# Patient Record
Sex: FEMALE | Race: BLACK OR AFRICAN AMERICAN | NOT HISPANIC OR LATINO | Employment: OTHER | URBAN - METROPOLITAN AREA
[De-identification: names, ages, dates, MRNs, and addresses within clinical notes are randomized per-mention and may not be internally consistent; named-entity substitution may affect disease eponyms.]

---

## 2017-01-17 ENCOUNTER — HISTORICAL (OUTPATIENT)
Dept: ADMINISTRATIVE | Facility: HOSPITAL | Age: 62
End: 2017-01-17

## 2017-02-10 ENCOUNTER — HISTORICAL (OUTPATIENT)
Dept: OTOLARYNGOLOGY | Facility: CLINIC | Age: 62
End: 2017-02-10

## 2017-03-10 ENCOUNTER — HISTORICAL (OUTPATIENT)
Dept: LAB | Facility: HOSPITAL | Age: 62
End: 2017-03-10

## 2018-06-26 ENCOUNTER — HISTORICAL (OUTPATIENT)
Dept: ADMINISTRATIVE | Facility: HOSPITAL | Age: 63
End: 2018-06-26

## 2018-07-26 ENCOUNTER — HISTORICAL (OUTPATIENT)
Dept: ADMINISTRATIVE | Facility: HOSPITAL | Age: 63
End: 2018-07-26

## 2018-08-02 ENCOUNTER — HISTORICAL (OUTPATIENT)
Dept: ADMINISTRATIVE | Facility: HOSPITAL | Age: 63
End: 2018-08-02

## 2018-09-07 ENCOUNTER — HISTORICAL (OUTPATIENT)
Dept: ADMINISTRATIVE | Facility: HOSPITAL | Age: 63
End: 2018-09-07

## 2018-10-17 ENCOUNTER — HISTORICAL (OUTPATIENT)
Dept: LAB | Facility: HOSPITAL | Age: 63
End: 2018-10-17

## 2018-10-17 LAB
ABS NEUT (OLG): 3.1 X10(3)/MCL (ref 1.5–6.9)
APTT PPP: 29.1 SECOND(S) (ref 25–35)
BUN SERPL-MCNC: 14 MG/DL (ref 10–20)
CALCIUM SERPL-MCNC: 9 MG/DL (ref 8–10.5)
CHLORIDE SERPL-SCNC: 96 MMOL/L (ref 100–108)
CO2 SERPL-SCNC: 43 MMOL/L (ref 21–35)
CREAT SERPL-MCNC: 0.97 MG/DL (ref 0.7–1.3)
CREAT/UREA NIT SERPL: 14
ERYTHROCYTE [DISTWIDTH] IN BLOOD BY AUTOMATED COUNT: 14.1 % (ref 11.5–17)
GLUCOSE SERPL-MCNC: 132 MG/DL (ref 75–116)
HCT VFR BLD AUTO: 47.7 % (ref 36–48)
HGB BLD-MCNC: 14.1 GM/DL (ref 12–16)
INR PPP: 1 (ref 0–1.2)
MCH RBC QN AUTO: 28 PG (ref 27–34)
MCHC RBC AUTO-ENTMCNC: 30 GM/DL (ref 31–36)
MCV RBC AUTO: 93 FL (ref 80–99)
PLATELET # BLD AUTO: 178 X10(3)/MCL (ref 140–400)
PMV BLD AUTO: 11.1 FL (ref 6.8–10)
POTASSIUM SERPL-SCNC: 4.1 MMOL/L (ref 3.6–5.2)
PROTHROMBIN TIME: 10.7 SECOND(S) (ref 9–12)
RBC # BLD AUTO: 5.12 X10(6)/MCL (ref 4.2–5.4)
SODIUM SERPL-SCNC: 139 MMOL/L (ref 135–145)
WBC # SPEC AUTO: 5.1 X10(3)/MCL (ref 4.5–11.5)

## 2019-06-11 ENCOUNTER — HISTORICAL (OUTPATIENT)
Dept: ADMINISTRATIVE | Facility: HOSPITAL | Age: 64
End: 2019-06-11

## 2019-10-15 ENCOUNTER — HISTORICAL (OUTPATIENT)
Dept: ADMINISTRATIVE | Facility: HOSPITAL | Age: 64
End: 2019-10-15

## 2019-10-23 ENCOUNTER — HISTORICAL (OUTPATIENT)
Dept: ADMINISTRATIVE | Facility: HOSPITAL | Age: 64
End: 2019-10-23

## 2021-02-01 LAB
ALBUMIN SERPL-MCNC: 4.2 G/DL (ref 3.4–5)
ALBUMIN/GLOB SERPL: 1.4 {RATIO}
ALP SERPL-CCNC: 79 U/L (ref 50–144)
ALT SERPL-CCNC: 10 U/L (ref 1–45)
ANION GAP SERPL CALC-SCNC: 6 MMOL/L (ref 7–16)
ANISOCYTOSIS BLD QL SMEAR: NORMAL
AST SERPL-CCNC: 24 U/L (ref 14–36)
BASOPHILS # BLD AUTO: 0.04 X10(3)/MCL (ref 0.01–0.08)
BASOPHILS NFR BLD AUTO: 0.7 % (ref 0.1–1.2)
BILIRUB SERPL-MCNC: 0.7 MG/DL (ref 0.1–1)
BUN SERPL-MCNC: 19 MG/DL (ref 7–20)
CALCIUM SERPL-MCNC: 9.8 MG/DL (ref 8.4–10.2)
CHLORIDE SERPL-SCNC: 99 MMOL/L (ref 94–110)
CHOLEST SERPL-MCNC: 189 MG/DL (ref 0–200)
CO2 SERPL-SCNC: 33 MMOL/L (ref 21–32)
CREAT SERPL-MCNC: 0.9 MG/DL (ref 0.52–1.04)
CREAT/UREA NIT SERPL: 21.1 (ref 12–20)
DACRYOCYTES BLD QL SMEAR: SLIGHT
EOSINOPHIL # BLD AUTO: 0.64 X10(3)/MCL (ref 0.04–0.36)
EOSINOPHIL NFR BLD AUTO: 10.8 % (ref 0.7–7)
ERYTHROCYTE [DISTWIDTH] IN BLOOD BY AUTOMATED COUNT: 13.9 % (ref 11–14.5)
EST. AVERAGE GLUCOSE BLD GHB EST-MCNC: 144 MG/DL (ref 70–115)
GLOBULIN SER-MCNC: 3 G/DL (ref 2–3.9)
GLUCOSE SERPL-MCNC: 98 MGM./DL (ref 70–115)
HBA1C MFR BLD: 6.8 % (ref 4–6)
HCT VFR BLD AUTO: 46.7 % (ref 36–48)
HDLC SERPL-MCNC: 98 MG/DL (ref 40–60)
HGB BLD-MCNC: 14.2 G/DL (ref 11.8–16)
IMM GRANULOCYTES # BLD AUTO: 0.01 X10E3/UL (ref 0–0.03)
IMM GRANULOCYTES NFR BLD AUTO: 0.2 % (ref 0–0.5)
LDLC SERPL CALC-MCNC: 68.8 MG/DL (ref 30–100)
LYMPHOCYTES # BLD AUTO: 1.43 X10(3)/MCL (ref 1.16–3.74)
LYMPHOCYTES NFR BLD AUTO: 24.1 % (ref 20–55)
MACROCYTES BLD QL SMEAR: NORMAL
MCH RBC QN AUTO: 26.2 PG (ref 27–34)
MCHC RBC AUTO-ENTMCNC: 30.4 G/DL (ref 31–37)
MCV RBC AUTO: 86 FL (ref 79–99)
MICROCYTES BLD QL SMEAR: NORMAL
MONOCYTES # BLD AUTO: 0.41 X10(3)/MCL (ref 0.24–0.36)
MONOCYTES NFR BLD AUTO: 6.9 % (ref 4.7–12.5)
NEUTROPHILS # BLD AUTO: 3.4 X10(3)/MCL (ref 1.56–6.13)
NEUTROPHILS NFR BLD AUTO: 57.3 % (ref 37–73)
PLATELET # BLD AUTO: 226 X10(3)/MCL (ref 140–371)
PMV BLD AUTO: 12 FL (ref 9.4–12.4)
POTASSIUM SERPL-SCNC: 4.4 MMOL/L (ref 3.5–5.1)
PROT SERPL-MCNC: 7.2 G/DL (ref 6.3–8.2)
RBC # BLD AUTO: 5.43 X10(6)/MCL (ref 4–5.1)
RBC MORPH BLD: NORMAL
SODIUM SERPL-SCNC: 138 MMOL/L (ref 135–145)
TARGETS BLD QL SMEAR: SLIGHT
TRIGL SERPL-MCNC: 63 MG/DL (ref 30–200)
WBC # SPEC AUTO: 5.9 X10(3)/MCL (ref 4–11.5)

## 2021-07-28 LAB
CHOLEST SERPL-MCNC: 155 MG/DL (ref 0–200)
EST. AVERAGE GLUCOSE BLD GHB EST-MCNC: 144 MG/DL (ref 70–115)
HBA1C MFR BLD: 6.8 % (ref 4–6)
HDLC SERPL-MCNC: 71 MG/DL (ref 40–60)
LDLC SERPL CALC-MCNC: 56.6 MG/DL (ref 30–100)
TRIGL SERPL-MCNC: 74 MG/DL (ref 30–200)
TSH SERPL-ACNC: 3.15 UIU/ML (ref 0.36–3.74)

## 2021-09-13 LAB
ALBUMIN SERPL-MCNC: 3.7 G/DL (ref 3.4–5)
ALBUMIN/GLOB SERPL: 1.2 {RATIO}
ALP SERPL-CCNC: 78 U/L (ref 50–144)
ALT SERPL-CCNC: 67 U/L (ref 1–45)
ANION GAP SERPL CALC-SCNC: 2 MMOL/L (ref 7–16)
AST SERPL-CCNC: 65 U/L (ref 14–36)
BASOPHILS # BLD AUTO: 0.03 X10(3)/MCL (ref 0.01–0.08)
BASOPHILS NFR BLD AUTO: 0.5 % (ref 0.1–1.2)
BILIRUB SERPL-MCNC: 0.6 MG/DL (ref 0.1–1)
BUN SERPL-MCNC: 17 MG/DL (ref 7–20)
CALCIUM SERPL-MCNC: 9.1 MG/DL (ref 8.4–10.2)
CHLORIDE SERPL-SCNC: 99 MMOL/L (ref 94–110)
CO2 SERPL-SCNC: 38 MMOL/L (ref 21–32)
CREAT SERPL-MCNC: 0.95 MG/DL (ref 0.52–1.04)
CREAT/UREA NIT SERPL: 17.9 (ref 12–20)
EOSINOPHIL # BLD AUTO: 0.19 X10(3)/MCL (ref 0.04–0.36)
EOSINOPHIL NFR BLD AUTO: 3.4 % (ref 0.7–7)
ERYTHROCYTE [DISTWIDTH] IN BLOOD BY AUTOMATED COUNT: 15.3 % (ref 11–14.5)
GLOBULIN SER-MCNC: 3 G/DL (ref 2–3.9)
GLUCOSE SERPL-MCNC: 123 MGM./DL (ref 70–115)
HCT VFR BLD AUTO: 39.9 % (ref 36–48)
HGB BLD-MCNC: 11.7 G/DL (ref 11.8–16)
IMM GRANULOCYTES # BLD AUTO: 0.01 X10E3/UL (ref 0–0.03)
IMM GRANULOCYTES NFR BLD AUTO: 0.2 % (ref 0–0.5)
LYMPHOCYTES # BLD AUTO: 0.62 X10(3)/MCL (ref 1.16–3.74)
LYMPHOCYTES NFR BLD AUTO: 11.1 % (ref 20–55)
MCH RBC QN AUTO: 25.9 PG (ref 27–34)
MCHC RBC AUTO-ENTMCNC: 29.3 G/DL (ref 31–37)
MCV RBC AUTO: 88.5 FL (ref 79–99)
MONOCYTES # BLD AUTO: 0.26 X10(3)/MCL (ref 0.24–0.36)
MONOCYTES NFR BLD AUTO: 4.7 % (ref 4.7–12.5)
NEUTROPHILS # BLD AUTO: 4.47 X10(3)/MCL (ref 1.56–6.13)
NEUTROPHILS NFR BLD AUTO: 80.1 % (ref 37–73)
PLATELET # BLD AUTO: 241 X10(3)/MCL (ref 140–371)
PMV BLD AUTO: 10.6 FL (ref 9.4–12.4)
POTASSIUM SERPL-SCNC: 4.5 MMOL/L (ref 3.5–5.1)
PROT SERPL-MCNC: 6.7 G/DL (ref 6.3–8.2)
RBC # BLD AUTO: 4.51 X10(6)/MCL (ref 4–5.1)
SODIUM SERPL-SCNC: 139 MMOL/L (ref 135–145)
WBC # SPEC AUTO: 5.6 X10(3)/MCL (ref 4–11.5)

## 2021-10-18 LAB
ALBUMIN SERPL-MCNC: 4.1 G/DL (ref 3.4–5)
ALBUMIN/GLOB SERPL: 1.3 {RATIO}
ALP SERPL-CCNC: 84 U/L (ref 50–144)
ALT SERPL-CCNC: 18 U/L (ref 1–45)
ANION GAP SERPL CALC-SCNC: 7 MMOL/L (ref 7–16)
AST SERPL-CCNC: 28 U/L (ref 14–36)
BILIRUB SERPL-MCNC: 0.63 MG/DL (ref 0.1–1)
BUN SERPL-MCNC: 13 MG/DL (ref 7–20)
CALCIUM SERPL-MCNC: 9.6 MG/DL (ref 8.4–10.2)
CHLORIDE SERPL-SCNC: 95 MMOL/L (ref 94–110)
CO2 SERPL-SCNC: 39 MMOL/L (ref 21–32)
CREAT SERPL-MCNC: 0.98 MG/DL (ref 0.52–1.04)
CREAT/UREA NIT SERPL: 13.3 (ref 12–20)
GLOBULIN SER-MCNC: 3.2 G/DL (ref 2–3.9)
GLUCOSE SERPL-MCNC: 122 MGM./DL (ref 70–115)
POTASSIUM SERPL-SCNC: 4.1 MMOL/L (ref 3.5–5.1)
PROT SERPL-MCNC: 7.3 G/DL (ref 6.3–8.2)
SODIUM SERPL-SCNC: 141 MMOL/L (ref 135–145)

## 2022-01-17 LAB
ALBUMIN SERPL-MCNC: 4.3 G/DL (ref 3.4–5)
ALBUMIN/GLOB SERPL: 1.5 {RATIO}
ALP SERPL-CCNC: 73 U/L (ref 50–144)
ALT SERPL-CCNC: 14 U/L (ref 1–45)
ANION GAP SERPL CALC-SCNC: 4 MMOL/L (ref 2–13)
AST SERPL-CCNC: 29 U/L (ref 14–36)
BILIRUB SERPL-MCNC: 0.52 MG/DL (ref 0–1)
BUN SERPL-MCNC: 26 MG/DL (ref 7–20)
CALCIUM SERPL-MCNC: 9.8 MG/DL (ref 8.4–10.2)
CHLORIDE SERPL-SCNC: 101 MMOL/L (ref 94–110)
CO2 SERPL-SCNC: 38 MMOL/L (ref 21–32)
CREAT SERPL-MCNC: 0.82 MG/DL (ref 0.52–1.04)
CREAT/UREA NIT SERPL: 31.7 (ref 12–20)
EST CREAT CLEARANCE SER (OHS): 64.89 ML/MIN
EST. AVERAGE GLUCOSE BLD GHB EST-MCNC: 162 MG/DL (ref 70–115)
GLOBULIN SER-MCNC: 2.9 G/DL (ref 2–3.9)
GLUCOSE SERPL-MCNC: 117 MGM./DL (ref 70–115)
HBA1C MFR BLD: 7.4 % (ref 4–6)
POTASSIUM SERPL-SCNC: 4.3 MMOL/L (ref 3.5–5.1)
PROT SERPL-MCNC: 7.2 G/DL (ref 6.3–8.2)
SODIUM SERPL-SCNC: 143 MMOL/L (ref 135–145)

## 2022-04-10 ENCOUNTER — HISTORICAL (OUTPATIENT)
Dept: ADMINISTRATIVE | Facility: HOSPITAL | Age: 67
End: 2022-04-10
Payer: MEDICAID

## 2022-04-17 ENCOUNTER — HISTORICAL (OUTPATIENT)
Dept: ADMINISTRATIVE | Facility: HOSPITAL | Age: 67
End: 2022-04-17

## 2022-04-18 ENCOUNTER — HISTORICAL (OUTPATIENT)
Dept: ADMINISTRATIVE | Facility: HOSPITAL | Age: 67
End: 2022-04-18

## 2022-04-26 VITALS
BODY MASS INDEX: 23.48 KG/M2 | SYSTOLIC BLOOD PRESSURE: 84 MMHG | OXYGEN SATURATION: 98 % | WEIGHT: 132.5 LBS | DIASTOLIC BLOOD PRESSURE: 52 MMHG | HEIGHT: 63 IN

## 2022-04-30 ENCOUNTER — HISTORICAL (OUTPATIENT)
Dept: ADMINISTRATIVE | Facility: HOSPITAL | Age: 67
End: 2022-04-30
Payer: MEDICAID

## 2022-05-03 NOTE — HISTORICAL [facility] CERNER
This is a historical note converted from Yadi. Formatting and pictures may have been removed.  Please reference Yadi for original formatting and attached multimedia. Chief Complaint  follow up with lab results  History of Present Illness  65 BF PMH CAD, CHF, severe COPD, DOYLE here for 6m follow up.  ?   States she is doing well in general except her breathing; had been on Advair middle of last year but it was not working and she was utilizing?her rescue inhaler between 8-10 times daily as well as using nebulizer medication and was still having difficulty with dyspnea. She home oxygen and wears it PRN. She was given samples of Trelegy Ellipta to?try and felt it worked great and she was able to drastically cut the amount of times she relied on the Albuterol INH. She has now run out of samples and insurance has refused to cover the medications. So she is now relying on Nebulizer (duoneb)?and Albuterol INH.? She continues to?follow up with Pulmonology ([facility])?routinely.?  ?   Last cardiology (Dr Naylor)?checkup was good and had appt with them this morning with blood work. She is limited with activity due to her COPD but does not experience chest discomfort with activity.  ?   She is on Metformin 500mg daily, does not check blood sugar levels unless she feels its dropping--she keeps peppermints on hand or sometimes drinks OJ for lows; A1c 6.8%  ?   Has been on duloxetine 90mg?recently, doing well with it.  ?   Had lumbar surgery last year, still with pain. Refuses pain medication-Dr Quevedo has her on Gabapentin 400mg TID  ?   Labs: BUN 19, creatinine 0.90, +K 4.4, normal liver fx, Aic 6.8%, chol 189, HDL 98, trigs 63, LDL 68, H&H 14.2/46.7  ?   *Last Wellness June 2020  Review of Systems  Constitutional:?no fever, fatigue, weakness  Eye:?no vision changes, eye redness, drainage, or pain  ENMT:?no sore throat, ear pain, sinus pain/congestion, nasal congestion/drainage  Respiratory:?+cough, non-productive;  occasional?wheezing and?shortness of breath  Cardiovascular:?no chest pain, no palpitations, no edema  Gastrointestinal:?no nausea, vomiting, or diarrhea. No abdominal pain  Genitourinary:?no dysuria, no urinary frequency or urgency, no hematuria  Endocrine:?no heat or cold intolerance, no excessive thirst or excessive urination  Musculoskeletal:?chronic lumbar back pain, no joint swelling  Integumentary:?no skin rash or abnormal lesion  Neurologic: no headache, no dizziness, no weakness?  ?  Physical Exam  Vitals & Measurements  T:?36.7? ?C (Temporal Artery)? HR:?87(Peripheral)? BP:?90/58? SpO2:?92%?  HT:?161.80?cm? WT:?62.100?kg? BMI:?23.72?  General:?AAOx3, in no acute distress  Eye: PERRL, clear conjunctiva, eyelids normal  HENT:?TMs/ear canals clear, oropharynx without erythema/exudate  Neck: no thyromegaly or lymphadenopathy  Respiratory:?clear to auscultation bilaterally  Cardiovascular:?regular rate and rhythm without murmurs  Gastrointestinal:?soft, non-tender, with normal bowel sounds?  Integumentary: no rashes or skin lesions present, no peripheral edema  Musculoskeletal: full ROM of all extremities /spine without limitation; steady gait  ?  ?  Assessment/Plan  1.?COPD, severe ? J44.9  Appeal to Insurance to cover Trelegy Ellipta  Samples give?(2 boxes-30 doses)?  DO NOT use Advair any longer  ?  6 months FU--Annual Wellness with labs  2.?Diabetes mellitus ? E11.9  ?A1c 6.8%-Continue Metformin 500mg daily  3.?HTN - Hypertension ? I10  4.?Chronic low back pain ? M54.5  ?FU Ortho as scheduled  Referrals  Clinic Follow Up Established Well, *Est. 08/03/21 8:30:00 CDT, Order for future visit, Chronic low back pain, [facility]  Clinic Follow-Up Lab Draw, *Est. 07/28/21 8:45:00 CDT, Order for future visit, Chronic low back pain, [facility]   Problem List/Past Medical History  Ongoing  Chronic low back pain  COPD, severe  Diabetes mellitus  Encounter for routine  adult health examination with abnormal findings  GERD (gastroesophageal reflux disease)  HTN - Hypertension  Laryngopharyngeal reflux (LPR)  On home O2  Respiratory failure  Historical  No qualifying data  Procedure/Surgical History  Colonoscopy (02/28/2020)  bx of vocal cords (2016)  R knee sx (1996)  Carpal tunnel release  Cataract surgery of left eye  Cataract surgery of right eye  cyst removed left breast  Hysterectomy   Medications  albuterol CFC free 90 mcg/inh inhalation aerosol with adapter, 2 puff(s), INH, QID  albuterol-ipratropium 2.5 mg-0.5 mg/3 mL inhalation solution, 3 mL, NEB, QID  aspirin 81 mg oral tablet, 81 mg= 1 tab(s), Oral, Daily, 4 refills  carvedilol 3.125 mg oral tablet, 3.125 mg= 1 tab(s), Oral, BID  DULoxetine 30 mg oral delayed release capsule, 30 mg= 1 cap(s), Oral, Daily  DULoxetine 60 mg oral delayed release capsule, 60 mg= 1 cap(s), Oral, Daily  furosemide 20 mg oral tablet, 20 mg= 1 tab(s), Oral, Daily  gabapentin 400 mg oral capsule, 400 mg= 1 cap(s), Oral, TID  lisinopril 2.5 mg oral tablet, 2.5 mg= 1 tab(s), Oral, Daily, 3 refills  loratadine 10 mg oral tablet, 10 mg= 1 tab(s), Oral, Daily, 5 refills  metformin 500 mg oral tablet, 500 mg= 1 tab(s), Oral, Daily, 3 refills  omeprazole 40 mg oral DR capsule, 40 mg= 1 cap(s), Oral, Daily, PRN, 5 refills  Promethazine DM 6.25 mg-15 mg/5 mL oral syrup, 5 mL, Oral, q6hr, PRN  simvastatin 10 mg oral tablet, 10 mg= 1 tab(s), Oral, Once a day (at bedtime), 1 refills  Hermilo-24 300 mg/24 hours oral capsule, extended release, 300 mg= 1 cap(s), Oral, Daily  Trelegy Ellipta 200 mcg-62.5 mcg-25 mcg/inh inhalation powder, 1 puff(s), INH, Daily  Allergies  Darvocet-N 100  Social History  Abuse/Neglect  No, 02/02/2021  Alcohol  Never, 04/20/2016  Employment/School  Unemployed, 06/30/2016  Unemployed, 04/20/2016  Exercise  Self assessment: Poor condition., 06/30/2016  Home/Environment  Lives with Alone. Living situation: Home/Independent. Home  equipment: CPAP/BiPAP, Glucose monitoring, Oxygen, Respiratory treatments. Alcohol abuse in household: No. Substance abuse in household: No. Smoker in household: No., 06/30/2016  Lives with Alone. Home equipment: CPAP/BiPAP, Oxygen, NEBULIZER. Alcohol abuse in household: No. Substance abuse in household: No. Smoker in household: No. Feels unsafe at home: No. Safe place to go: Yes. Family/Friends available for support: Yes., 04/20/2016  Nutrition/Health  Regular, Diet restrictions: SPICY FOOD. Sleeping concerns: Yes. Feels highly stressed: No., 06/30/2016  Substance Use  Never, 04/20/2016  Tobacco  Former smoker, quit more than 30 days ago, Cigarettes, No, 1.5 per day. 47 year(s). 16 Years (Age started). 62 Years (Age stopped)., 02/02/2021  Former smoker, 06/30/2016  Family History  Aneurysm: Mother.  Congestive heart disease.: Mother.  Diabetes mellitus type 1.: Father.  Hypertension.: Brother.  Kidney disease: Father and Brother.  Primary malignant neoplasm of prostate: Father.

## 2022-05-06 ENCOUNTER — HISTORICAL (OUTPATIENT)
Dept: ADMINISTRATIVE | Facility: HOSPITAL | Age: 67
End: 2022-05-06

## 2022-05-06 LAB
AGE: 65
AGE: 65 YEARS
ALBUMIN SERPL-MCNC: 3.7 G/DL (ref 3.4–5)
ALBUMIN SERPL-MCNC: 3.7 G/DL (ref 3.4–5)
ALBUMIN/GLOB SERPL: 1.5 {RATIO}
ALBUMIN/GLOB SERPL: 1.5 {RATIO}
ALP SERPL-CCNC: 60 U/L (ref 50–144)
ALP SERPL-CCNC: 60 U/L (ref 50–144)
ALT SERPL-CCNC: 22 U/L (ref 1–45)
ANION GAP SERPL CALC-SCNC: 1 MMOL/L (ref 2–13)
ANION GAP SERPL CALC-SCNC: 1 MMOL/L (ref 2–13)
APTT PPP: 41.4 S (ref 24.3–32.8)
APTT PPP: 41.4 SECONDS (ref 24.3–32.8)
AST SERPL-CCNC: 45 U/L (ref 14–36)
BASOPHILS # BLD AUTO: 0.02 10*3/UL (ref 0.01–0.08)
BASOPHILS # BLD AUTO: 0.02 10*3/UL (ref 0.01–0.08)
BASOPHILS NFR BLD AUTO: 0.4 % (ref 0.1–1.2)
BASOPHILS NFR BLD AUTO: 0.4 % (ref 0.1–1.2)
BILIRUB SERPL-MCNC: 0.58 MG/DL (ref 0–1)
BILIRUB SERPL-MCNC: 0.58 MG/DL (ref 0–1)
BUN SERPL-MCNC: 14 MG/DL (ref 7–20)
CALCIUM SERPL-MCNC: 9.2 MG/DL (ref 8.4–10.2)
CALCIUM SERPL-MCNC: 9.2 MG/DL (ref 8.4–10.2)
CHLORIDE SERPL-SCNC: 101 MMOL/L (ref 98–110)
CO2 SERPL-SCNC: 37 MMOL/L (ref 21–32)
CO2 SERPL-SCNC: 37 MMOL/L (ref 21–32)
CREAT SERPL-MCNC: 0.71 MG/DL (ref 0.52–1.04)
CREAT SERPL-MCNC: 0.71 MG/DL (ref 0.52–1.04)
CREAT/UREA NIT SERPL: 19.7 (ref 12–20)
CREAT/UREA NIT SERPL: 19.7 (ref 12–20)
EOSINOPHIL # BLD AUTO: 0.2 10*3/UL (ref 0.04–0.36)
EOSINOPHIL # BLD AUTO: 0.2 10*3/UL (ref 0.04–0.36)
EOSINOPHIL NFR BLD AUTO: 4 % (ref 0.7–7)
EOSINOPHIL NFR BLD AUTO: 4 % (ref 0.7–7)
ERYTHROCYTE [DISTWIDTH] IN BLOOD BY AUTOMATED COUNT: 14.6 % (ref 11–14.5)
ERYTHROCYTE [DISTWIDTH] IN BLOOD BY AUTOMATED COUNT: 14.6 % (ref 11–14.5)
GLOBULIN SER-MCNC: 2.4 G/DL (ref 2–3.9)
GLUCOSE SERPL-MCNC: 97 MG/DL (ref 70–115)
GLUCOSE SERPL-MCNC: 97 MG/DL (ref 70–115)
HCT VFR BLD AUTO: 27.2 % (ref 36–48)
HCT VFR BLD AUTO: 27.2 % (ref 36–48)
HGB BLD-MCNC: 8.4 G/DL (ref 11.8–16)
HGB BLD-MCNC: 8.4 G/DL (ref 11.8–16)
IMM GRANULOCYTES # BLD AUTO: 0.01 10*3/UL (ref 0–0.03)
IMM GRANULOCYTES # BLD AUTO: 0.01 10*3/UL (ref 0–0.03)
IMM GRANULOCYTES NFR BLD AUTO: 0.2 % (ref 0–0.5)
IMM GRANULOCYTES NFR BLD AUTO: 0.2 % (ref 0–0.5)
INR PPP: 1.28 (ref 2–3)
LYMPHOCYTES # BLD AUTO: 0.95 10*3/UL (ref 1.16–3.74)
LYMPHOCYTES # BLD AUTO: 0.95 10*3/UL (ref 1.16–3.74)
LYMPHOCYTES NFR BLD AUTO: 19 % (ref 20–55)
LYMPHOCYTES NFR BLD AUTO: 19 % (ref 20–55)
MANUAL DIFF? (OHS): ABNORMAL
MANUAL DIFF? (OHS): NORMAL
MCH RBC QN AUTO: 27.4 PG (ref 27–34)
MCH RBC QN AUTO: 27.4 PG (ref 27–34)
MCHC RBC AUTO-ENTMCNC: 30.9 G/DL (ref 31–37)
MCHC RBC AUTO-ENTMCNC: 30.9 G/DL (ref 31–37)
MCV RBC AUTO: 88.6 FL (ref 79–99)
MCV RBC AUTO: 88.6 FL (ref 79–99)
MONOCYTES # BLD AUTO: 0.44 10*3/UL (ref 0.24–0.36)
MONOCYTES # BLD AUTO: 0.44 10*3/UL (ref 0.24–0.36)
MONOCYTES NFR BLD AUTO: 8.8 % (ref 4.7–12.5)
MONOCYTES NFR BLD AUTO: 8.8 % (ref 4.7–12.5)
NEUTROPHILS # BLD AUTO: 3.39 10*3/UL (ref 1.56–6.13)
NEUTROPHILS # BLD AUTO: 3.39 10*3/UL (ref 1.56–6.13)
NEUTROPHILS NFR BLD AUTO: 67.6 % (ref 37–73)
NEUTROPHILS NFR BLD AUTO: 67.6 % (ref 37–73)
PLATELET # BLD AUTO: 173 10*3/UL (ref 140–371)
PLATELET # BLD AUTO: 173 10*3/UL (ref 140–371)
PMV BLD AUTO: 11.6 FL (ref 9.4–12.4)
PMV BLD AUTO: 11.6 FL (ref 9.4–12.4)
POTASSIUM SERPL-SCNC: 4.1 MMOL/L (ref 3.5–5.1)
PROT SERPL-MCNC: 6.1 G/DL (ref 6.3–8.2)
PROT SERPL-MCNC: 6.1 G/DL (ref 6.3–8.2)
PROTHROMBIN TIME: 13.3 S (ref 9.3–11.9)
PROTHROMBIN TIME: 13.3 S (ref 9.3–11.9)
RBC # BLD AUTO: 3.07 10*6/UL (ref 4–5.1)
RBC # BLD AUTO: 3.07 10*6/UL (ref 4–5.1)
SODIUM SERPL-SCNC: 139 MMOL/L (ref 135–145)
SODIUM SERPL-SCNC: 139 MMOL/L (ref 135–145)
WBC # SPEC AUTO: 5 10*3/UL (ref 4–11.5)
WBC # SPEC AUTO: 5 10*3/UL (ref 4–11.5)

## 2022-05-09 LAB
BASOPHILS # BLD AUTO: 0.01 10*3/UL (ref 0.01–0.08)
BASOPHILS NFR BLD AUTO: 0.3 % (ref 0.1–1.2)
EOSINOPHIL # BLD AUTO: 0.16 10*3/UL (ref 0.04–0.36)
EOSINOPHIL NFR BLD AUTO: 4.2 % (ref 0.7–7)
ERYTHROCYTE [DISTWIDTH] IN BLOOD BY AUTOMATED COUNT: 14.4 % (ref 11–14.5)
HCT VFR BLD AUTO: 27.2 % (ref 36–48)
HGB BLD-MCNC: 8.5 G/DL (ref 11.8–16)
IMM GRANULOCYTES # BLD AUTO: 0 10*3/UL (ref 0–0.03)
IMM GRANULOCYTES NFR BLD AUTO: 0 % (ref 0–0.5)
LYMPHOCYTES # BLD AUTO: 0.84 10*3/UL (ref 1.16–3.74)
LYMPHOCYTES NFR BLD AUTO: 21.9 % (ref 20–55)
MANUAL DIFF? (OHS): ABNORMAL
MANUAL DIFF? (OHS): ABNORMAL
MANUAL DIFF? (OHS): NORMAL
MANUAL DIFF? (OHS): NORMAL
MCH RBC QN AUTO: 27 PG (ref 27–34)
MCHC RBC AUTO-ENTMCNC: 31.3 G/DL (ref 31–37)
MCV RBC AUTO: 86.3 FL (ref 79–99)
MONOCYTES # BLD AUTO: 0.37 10*3/UL (ref 0.24–0.36)
MONOCYTES NFR BLD AUTO: 9.7 % (ref 4.7–12.5)
NEUTROPHILS # BLD AUTO: 2.45 10*3/UL (ref 1.56–6.13)
NEUTROPHILS NFR BLD AUTO: 63.9 % (ref 37–73)
PLATELET # BLD AUTO: 203 10*3/UL (ref 140–371)
PMV BLD AUTO: 11.5 FL (ref 9.4–12.4)
RBC # BLD AUTO: 3.15 10*6/UL (ref 4–5.1)
WBC # SPEC AUTO: 3.8 10*3/UL (ref 4–11.5)

## 2022-05-10 ENCOUNTER — HISTORICAL (OUTPATIENT)
Dept: ADMINISTRATIVE | Facility: HOSPITAL | Age: 67
End: 2022-05-10

## 2022-06-28 ENCOUNTER — HISTORICAL (OUTPATIENT)
Dept: ADMINISTRATIVE | Facility: HOSPITAL | Age: 67
End: 2022-06-28

## 2022-09-07 ENCOUNTER — LAB VISIT (OUTPATIENT)
Dept: LAB | Facility: HOSPITAL | Age: 67
End: 2022-09-07
Attending: UROLOGY
Payer: MEDICARE

## 2022-09-07 DIAGNOSIS — R31.0 GROSS HEMATURIA: Primary | ICD-10-CM

## 2022-09-07 LAB
ANION GAP SERPL CALC-SCNC: 8 MEQ/L
BUN SERPL-MCNC: 17 MG/DL (ref 9.8–20.1)
CALCIUM SERPL-MCNC: 9.4 MG/DL (ref 8.4–10.2)
CHLORIDE SERPL-SCNC: 100 MMOL/L (ref 98–107)
CO2 SERPL-SCNC: 32 MMOL/L (ref 23–31)
CREAT SERPL-MCNC: 0.65 MG/DL (ref 0.55–1.02)
CREAT/UREA NIT SERPL: 26
GFR SERPLBLD CREATININE-BSD FMLA CKD-EPI: >60 MLS/MIN/1.73/M2
GLUCOSE SERPL-MCNC: 81 MG/DL (ref 82–115)
POTASSIUM SERPL-SCNC: 4.5 MMOL/L (ref 3.5–5.1)
SODIUM SERPL-SCNC: 140 MMOL/L (ref 136–145)

## 2022-09-07 PROCEDURE — 36415 COLL VENOUS BLD VENIPUNCTURE: CPT

## 2022-09-07 PROCEDURE — 80048 BASIC METABOLIC PNL TOTAL CA: CPT

## 2022-12-12 ENCOUNTER — HISTORICAL (OUTPATIENT)
Dept: ADMINISTRATIVE | Facility: HOSPITAL | Age: 67
End: 2022-12-12

## 2023-01-13 ENCOUNTER — HOSPITAL ENCOUNTER (INPATIENT)
Facility: HOSPITAL | Age: 68
LOS: 13 days | Discharge: ANOTHER HOSPITAL | DRG: 207 | End: 2023-02-02
Attending: FAMILY MEDICINE | Admitting: FAMILY MEDICINE
Payer: MEDICARE

## 2023-01-13 ENCOUNTER — HISTORICAL (OUTPATIENT)
Dept: ADMINISTRATIVE | Facility: HOSPITAL | Age: 68
End: 2023-01-13
Payer: MEDICARE

## 2023-01-13 PROCEDURE — 80053 COMPREHEN METABOLIC PANEL: CPT

## 2023-01-13 PROCEDURE — 96360 HYDRATION IV INFUSION INIT: CPT

## 2023-01-13 PROCEDURE — 71045 X-RAY EXAM CHEST 1 VIEW: CPT

## 2023-01-13 PROCEDURE — 94760 N-INVAS EAR/PLS OXIMETRY 1: CPT

## 2023-01-13 PROCEDURE — 36415 COLL VENOUS BLD VENIPUNCTURE: CPT

## 2023-01-13 PROCEDURE — 87040 BLOOD CULTURE FOR BACTERIA: CPT

## 2023-01-13 PROCEDURE — 85007 BL SMEAR W/DIFF WBC COUNT: CPT

## 2023-01-13 PROCEDURE — G0378 HOSPITAL OBSERVATION PER HR: HCPCS

## 2023-01-13 PROCEDURE — 87086 URINE CULTURE/COLONY COUNT: CPT

## 2023-01-13 PROCEDURE — 82746 ASSAY OF FOLIC ACID SERUM: CPT

## 2023-01-13 PROCEDURE — 86900 BLOOD TYPING SEROLOGIC ABO: CPT

## 2023-01-13 PROCEDURE — 74176 CT ABD & PELVIS W/O CONTRAST: CPT

## 2023-01-13 PROCEDURE — 94640 AIRWAY INHALATION TREATMENT: CPT

## 2023-01-13 PROCEDURE — 99990 CHARGE CONVERSION: CPT

## 2023-01-13 PROCEDURE — 99285 EMERGENCY DEPT VISIT HI MDM: CPT | Mod: 25

## 2023-01-13 PROCEDURE — P9016 RBC LEUKOCYTES REDUCED: HCPCS

## 2023-01-13 PROCEDURE — 82607 VITAMIN B-12: CPT

## 2023-01-13 PROCEDURE — 86901 BLOOD TYPING SEROLOGIC RH(D): CPT

## 2023-01-13 PROCEDURE — 87088 URINE BACTERIA CULTURE: CPT

## 2023-01-13 PROCEDURE — 86920 COMPATIBILITY TEST SPIN: CPT

## 2023-01-13 PROCEDURE — 36430 TRANSFUSION BLD/BLD COMPNT: CPT

## 2023-01-13 PROCEDURE — 86850 RBC ANTIBODY SCREEN: CPT

## 2023-01-13 PROCEDURE — 83605 ASSAY OF LACTIC ACID: CPT

## 2023-01-13 PROCEDURE — 93005 ELECTROCARDIOGRAM TRACING: CPT

## 2023-01-13 PROCEDURE — 81001 URINALYSIS AUTO W/SCOPE: CPT

## 2023-01-13 PROCEDURE — 85027 COMPLETE CBC AUTOMATED: CPT

## 2023-01-13 PROCEDURE — 83690 ASSAY OF LIPASE: CPT

## 2023-01-13 PROCEDURE — 71275 CT ANGIOGRAPHY CHEST: CPT

## 2023-01-13 PROCEDURE — 83880 ASSAY OF NATRIURETIC PEPTIDE: CPT

## 2023-01-14 ENCOUNTER — HISTORICAL (OUTPATIENT)
Dept: ADMINISTRATIVE | Facility: HOSPITAL | Age: 68
End: 2023-01-14
Payer: MEDICARE

## 2023-01-14 PROCEDURE — 82948 REAGENT STRIP/BLOOD GLUCOSE: CPT

## 2023-01-14 PROCEDURE — G0378 HOSPITAL OBSERVATION PER HR: HCPCS

## 2023-01-14 PROCEDURE — 85007 BL SMEAR W/DIFF WBC COUNT: CPT

## 2023-01-14 PROCEDURE — 99990 CHARGE CONVERSION: CPT

## 2023-01-14 PROCEDURE — 71045 X-RAY EXAM CHEST 1 VIEW: CPT

## 2023-01-14 PROCEDURE — P9016 RBC LEUKOCYTES REDUCED: HCPCS

## 2023-01-14 PROCEDURE — 80053 COMPREHEN METABOLIC PANEL: CPT

## 2023-01-14 PROCEDURE — 94640 AIRWAY INHALATION TREATMENT: CPT | Mod: 76

## 2023-01-14 PROCEDURE — 94761 N-INVAS EAR/PLS OXIMETRY MLT: CPT

## 2023-01-14 PROCEDURE — 93005 ELECTROCARDIOGRAM TRACING: CPT

## 2023-01-14 PROCEDURE — 85027 COMPLETE CBC AUTOMATED: CPT

## 2023-01-15 PROCEDURE — 85025 COMPLETE CBC W/AUTO DIFF WBC: CPT

## 2023-01-15 PROCEDURE — 84134 ASSAY OF PREALBUMIN: CPT

## 2023-01-15 PROCEDURE — G0378 HOSPITAL OBSERVATION PER HR: HCPCS

## 2023-01-15 PROCEDURE — 94761 N-INVAS EAR/PLS OXIMETRY MLT: CPT

## 2023-01-15 PROCEDURE — 80048 BASIC METABOLIC PNL TOTAL CA: CPT

## 2023-01-15 PROCEDURE — 99990 CHARGE CONVERSION: CPT | Mod: 76

## 2023-01-15 PROCEDURE — 94640 AIRWAY INHALATION TREATMENT: CPT | Mod: 76

## 2023-01-15 PROCEDURE — 82948 REAGENT STRIP/BLOOD GLUCOSE: CPT

## 2023-01-16 ENCOUNTER — HISTORICAL (OUTPATIENT)
Dept: ADMINISTRATIVE | Facility: HOSPITAL | Age: 68
End: 2023-01-16
Payer: MEDICARE

## 2023-01-16 PROCEDURE — 85027 COMPLETE CBC AUTOMATED: CPT

## 2023-01-16 PROCEDURE — 82948 REAGENT STRIP/BLOOD GLUCOSE: CPT

## 2023-01-16 PROCEDURE — 97530 THERAPEUTIC ACTIVITIES: CPT

## 2023-01-16 PROCEDURE — 93005 ELECTROCARDIOGRAM TRACING: CPT

## 2023-01-16 PROCEDURE — G0378 HOSPITAL OBSERVATION PER HR: HCPCS

## 2023-01-16 PROCEDURE — 99990 CHARGE CONVERSION: CPT

## 2023-01-16 PROCEDURE — 85007 BL SMEAR W/DIFF WBC COUNT: CPT

## 2023-01-16 PROCEDURE — 97116 GAIT TRAINING THERAPY: CPT

## 2023-01-16 PROCEDURE — 97161 PT EVAL LOW COMPLEX 20 MIN: CPT

## 2023-01-16 PROCEDURE — 93306 TTE W/DOPPLER COMPLETE: CPT

## 2023-01-16 PROCEDURE — 94640 AIRWAY INHALATION TREATMENT: CPT | Mod: 76

## 2023-01-16 PROCEDURE — 36415 COLL VENOUS BLD VENIPUNCTURE: CPT

## 2023-01-16 PROCEDURE — 94761 N-INVAS EAR/PLS OXIMETRY MLT: CPT

## 2023-01-16 PROCEDURE — 80048 BASIC METABOLIC PNL TOTAL CA: CPT

## 2023-01-17 ENCOUNTER — HISTORICAL (OUTPATIENT)
Dept: ADMINISTRATIVE | Facility: HOSPITAL | Age: 68
End: 2023-01-17
Payer: MEDICARE

## 2023-01-17 PROCEDURE — 82746 ASSAY OF FOLIC ACID SERUM: CPT

## 2023-01-17 PROCEDURE — 83550 IRON BINDING TEST: CPT

## 2023-01-17 PROCEDURE — 71045 X-RAY EXAM CHEST 1 VIEW: CPT

## 2023-01-17 PROCEDURE — 85027 COMPLETE CBC AUTOMATED: CPT

## 2023-01-17 PROCEDURE — 82948 REAGENT STRIP/BLOOD GLUCOSE: CPT

## 2023-01-17 PROCEDURE — 97530 THERAPEUTIC ACTIVITIES: CPT

## 2023-01-17 PROCEDURE — 80048 BASIC METABOLIC PNL TOTAL CA: CPT

## 2023-01-17 PROCEDURE — 83540 ASSAY OF IRON: CPT

## 2023-01-17 PROCEDURE — 94640 AIRWAY INHALATION TREATMENT: CPT | Mod: 76

## 2023-01-17 PROCEDURE — 97116 GAIT TRAINING THERAPY: CPT

## 2023-01-17 PROCEDURE — 94761 N-INVAS EAR/PLS OXIMETRY MLT: CPT

## 2023-01-17 PROCEDURE — 85007 BL SMEAR W/DIFF WBC COUNT: CPT

## 2023-01-17 PROCEDURE — 82607 VITAMIN B-12: CPT

## 2023-01-17 PROCEDURE — 99990 CHARGE CONVERSION: CPT | Mod: 76

## 2023-01-17 PROCEDURE — G0378 HOSPITAL OBSERVATION PER HR: HCPCS

## 2023-01-17 PROCEDURE — 82728 ASSAY OF FERRITIN: CPT

## 2023-01-17 PROCEDURE — 85044 MANUAL RETICULOCYTE COUNT: CPT

## 2023-01-17 PROCEDURE — 36415 COLL VENOUS BLD VENIPUNCTURE: CPT

## 2023-01-18 PROCEDURE — 97530 THERAPEUTIC ACTIVITIES: CPT

## 2023-01-18 PROCEDURE — 99990 CHARGE CONVERSION: CPT

## 2023-01-18 PROCEDURE — 94640 AIRWAY INHALATION TREATMENT: CPT | Mod: 76

## 2023-01-18 PROCEDURE — 80048 BASIC METABOLIC PNL TOTAL CA: CPT

## 2023-01-18 PROCEDURE — 36415 COLL VENOUS BLD VENIPUNCTURE: CPT

## 2023-01-18 PROCEDURE — 82948 REAGENT STRIP/BLOOD GLUCOSE: CPT

## 2023-01-18 PROCEDURE — 94761 N-INVAS EAR/PLS OXIMETRY MLT: CPT

## 2023-01-18 PROCEDURE — 85025 COMPLETE CBC W/AUTO DIFF WBC: CPT

## 2023-01-18 PROCEDURE — 97116 GAIT TRAINING THERAPY: CPT

## 2023-01-18 PROCEDURE — G0378 HOSPITAL OBSERVATION PER HR: HCPCS

## 2023-01-18 PROCEDURE — 83735 ASSAY OF MAGNESIUM: CPT

## 2023-01-19 PROCEDURE — 85027 COMPLETE CBC AUTOMATED: CPT

## 2023-01-19 PROCEDURE — 82948 REAGENT STRIP/BLOOD GLUCOSE: CPT

## 2023-01-19 PROCEDURE — 80048 BASIC METABOLIC PNL TOTAL CA: CPT

## 2023-01-19 PROCEDURE — 99990 CHARGE CONVERSION: CPT

## 2023-01-19 PROCEDURE — 85007 BL SMEAR W/DIFF WBC COUNT: CPT

## 2023-01-19 PROCEDURE — 36415 COLL VENOUS BLD VENIPUNCTURE: CPT

## 2023-01-19 PROCEDURE — 97110 THERAPEUTIC EXERCISES: CPT

## 2023-01-19 PROCEDURE — 97530 THERAPEUTIC ACTIVITIES: CPT

## 2023-01-19 PROCEDURE — 94640 AIRWAY INHALATION TREATMENT: CPT | Mod: 76

## 2023-01-19 PROCEDURE — G0378 HOSPITAL OBSERVATION PER HR: HCPCS

## 2023-01-19 PROCEDURE — 82272 OCCULT BLD FECES 1-3 TESTS: CPT

## 2023-01-19 PROCEDURE — 94761 N-INVAS EAR/PLS OXIMETRY MLT: CPT

## 2023-01-20 ENCOUNTER — HISTORICAL (OUTPATIENT)
Dept: ADMINISTRATIVE | Facility: HOSPITAL | Age: 68
End: 2023-01-20
Payer: MEDICARE

## 2023-01-20 PROCEDURE — 80048 BASIC METABOLIC PNL TOTAL CA: CPT

## 2023-01-20 PROCEDURE — 71045 X-RAY EXAM CHEST 1 VIEW: CPT

## 2023-01-20 PROCEDURE — 82550 ASSAY OF CK (CPK): CPT

## 2023-01-20 PROCEDURE — 82803 BLOOD GASES ANY COMBINATION: CPT

## 2023-01-20 PROCEDURE — 85027 COMPLETE CBC AUTOMATED: CPT

## 2023-01-20 PROCEDURE — 36415 COLL VENOUS BLD VENIPUNCTURE: CPT

## 2023-01-20 PROCEDURE — 87040 BLOOD CULTURE FOR BACTERIA: CPT

## 2023-01-20 PROCEDURE — 99990 CHARGE CONVERSION: CPT

## 2023-01-20 PROCEDURE — 84134 ASSAY OF PREALBUMIN: CPT

## 2023-01-20 PROCEDURE — 36600 WITHDRAWAL OF ARTERIAL BLOOD: CPT

## 2023-01-20 PROCEDURE — 81001 URINALYSIS AUTO W/SCOPE: CPT

## 2023-01-20 PROCEDURE — 85007 BL SMEAR W/DIFF WBC COUNT: CPT

## 2023-01-20 PROCEDURE — 87205 SMEAR GRAM STAIN: CPT

## 2023-01-20 PROCEDURE — 83605 ASSAY OF LACTIC ACID: CPT

## 2023-01-20 PROCEDURE — 97161 PT EVAL LOW COMPLEX 20 MIN: CPT

## 2023-01-20 PROCEDURE — 82553 CREATINE MB FRACTION: CPT

## 2023-01-20 PROCEDURE — 97530 THERAPEUTIC ACTIVITIES: CPT

## 2023-01-20 PROCEDURE — 94660 CPAP INITIATION&MGMT: CPT

## 2023-01-20 PROCEDURE — 94002 VENT MGMT INPAT INIT DAY: CPT

## 2023-01-20 PROCEDURE — 94640 AIRWAY INHALATION TREATMENT: CPT | Mod: 76

## 2023-01-20 PROCEDURE — 82948 REAGENT STRIP/BLOOD GLUCOSE: CPT

## 2023-01-20 PROCEDURE — 87070 CULTURE OTHR SPECIMN AEROBIC: CPT

## 2023-01-20 PROCEDURE — 89220 SPUTUM SPECIMEN COLLECTION: CPT

## 2023-01-20 PROCEDURE — 84484 ASSAY OF TROPONIN QUANT: CPT

## 2023-01-20 PROCEDURE — 94761 N-INVAS EAR/PLS OXIMETRY MLT: CPT

## 2023-01-20 PROCEDURE — 31720 CLEARANCE OF AIRWAYS: CPT

## 2023-01-21 ENCOUNTER — HISTORICAL (OUTPATIENT)
Dept: ADMINISTRATIVE | Facility: HOSPITAL | Age: 68
End: 2023-01-21
Payer: MEDICARE

## 2023-01-21 DIAGNOSIS — D64.9 ANEMIA: ICD-10-CM

## 2023-01-21 PROCEDURE — 82948 REAGENT STRIP/BLOOD GLUCOSE: CPT

## 2023-01-21 PROCEDURE — 71045 X-RAY EXAM CHEST 1 VIEW: CPT

## 2023-01-21 PROCEDURE — 94003 VENT MGMT INPAT SUBQ DAY: CPT

## 2023-01-21 PROCEDURE — 85007 BL SMEAR W/DIFF WBC COUNT: CPT

## 2023-01-21 PROCEDURE — 82803 BLOOD GASES ANY COMBINATION: CPT

## 2023-01-21 PROCEDURE — 83735 ASSAY OF MAGNESIUM: CPT

## 2023-01-21 PROCEDURE — 97530 THERAPEUTIC ACTIVITIES: CPT

## 2023-01-21 PROCEDURE — 94761 N-INVAS EAR/PLS OXIMETRY MLT: CPT

## 2023-01-21 PROCEDURE — 84484 ASSAY OF TROPONIN QUANT: CPT

## 2023-01-21 PROCEDURE — 85027 COMPLETE CBC AUTOMATED: CPT

## 2023-01-21 PROCEDURE — 82550 ASSAY OF CK (CPK): CPT

## 2023-01-21 PROCEDURE — 36415 COLL VENOUS BLD VENIPUNCTURE: CPT

## 2023-01-21 PROCEDURE — 82553 CREATINE MB FRACTION: CPT

## 2023-01-21 PROCEDURE — 99990 CHARGE CONVERSION: CPT

## 2023-01-21 PROCEDURE — 36600 WITHDRAWAL OF ARTERIAL BLOOD: CPT

## 2023-01-21 PROCEDURE — 31720 CLEARANCE OF AIRWAYS: CPT

## 2023-01-21 PROCEDURE — 80048 BASIC METABOLIC PNL TOTAL CA: CPT

## 2023-01-21 PROCEDURE — 94640 AIRWAY INHALATION TREATMENT: CPT | Mod: 76

## 2023-01-21 RX ORDER — ACETAMINOPHEN 325 MG/1
650 TABLET ORAL EVERY 4 HOURS PRN
Status: DISCONTINUED | OUTPATIENT
Start: 2023-01-22 | End: 2023-02-02 | Stop reason: HOSPADM

## 2023-01-21 RX ORDER — DOCUSATE SODIUM 100 MG/1
100 CAPSULE, LIQUID FILLED ORAL 2 TIMES DAILY
Status: DISCONTINUED | OUTPATIENT
Start: 2023-01-22 | End: 2023-01-23

## 2023-01-21 RX ORDER — PREGABALIN 50 MG/1
50 CAPSULE ORAL 3 TIMES DAILY
Status: ON HOLD | COMMUNITY
Start: 2022-11-17 | End: 2023-02-02 | Stop reason: HOSPADM

## 2023-01-21 RX ORDER — ONDANSETRON 2 MG/ML
8 INJECTION INTRAMUSCULAR; INTRAVENOUS EVERY 6 HOURS PRN
Status: DISCONTINUED | OUTPATIENT
Start: 2023-01-22 | End: 2023-02-02 | Stop reason: HOSPADM

## 2023-01-21 RX ORDER — LINACLOTIDE 145 UG/1
290 CAPSULE, GELATIN COATED ORAL DAILY
Status: ON HOLD | COMMUNITY
Start: 2022-11-05 | End: 2023-02-02

## 2023-01-21 RX ORDER — ROSUVASTATIN CALCIUM 40 MG/1
10 TABLET, COATED ORAL NIGHTLY
Status: ON HOLD | COMMUNITY
End: 2023-02-02 | Stop reason: HOSPADM

## 2023-01-21 RX ORDER — PREGABALIN 25 MG/1
50 CAPSULE ORAL 3 TIMES DAILY
Status: DISCONTINUED | OUTPATIENT
Start: 2023-01-22 | End: 2023-02-02 | Stop reason: HOSPADM

## 2023-01-21 RX ORDER — LACTULOSE 10 G/15ML
20 SOLUTION ORAL 4 TIMES DAILY
Status: DISCONTINUED | OUTPATIENT
Start: 2023-01-22 | End: 2023-01-25

## 2023-01-21 RX ORDER — FLUTICASONE PROPIONATE 50 MCG
1 SPRAY, SUSPENSION (ML) NASAL 2 TIMES DAILY
Status: DISCONTINUED | OUTPATIENT
Start: 2023-01-22 | End: 2023-02-02 | Stop reason: HOSPADM

## 2023-01-21 RX ORDER — POTASSIUM CHLORIDE 20 MEQ/1
20 TABLET, EXTENDED RELEASE ORAL DAILY
Status: DISCONTINUED | OUTPATIENT
Start: 2023-01-22 | End: 2023-01-23

## 2023-01-21 RX ORDER — LEVALBUTEROL 1.25 MG/.5ML
1.25 SOLUTION, CONCENTRATE RESPIRATORY (INHALATION) EVERY 4 HOURS
Status: DISCONTINUED | OUTPATIENT
Start: 2023-01-22 | End: 2023-02-02 | Stop reason: HOSPADM

## 2023-01-21 RX ORDER — POLYETHYLENE GLYCOL 3350 17 G/17G
17 POWDER, FOR SOLUTION ORAL DAILY
Status: DISCONTINUED | OUTPATIENT
Start: 2023-01-22 | End: 2023-02-02 | Stop reason: HOSPADM

## 2023-01-21 RX ORDER — CARVEDILOL 3.12 MG/1
3.12 TABLET ORAL 2 TIMES DAILY
Status: ON HOLD | COMMUNITY
Start: 2022-11-23 | End: 2023-02-02 | Stop reason: HOSPADM

## 2023-01-21 RX ORDER — ATORVASTATIN CALCIUM 40 MG/1
80 TABLET, FILM COATED ORAL DAILY
Status: DISCONTINUED | OUTPATIENT
Start: 2023-01-22 | End: 2023-02-02 | Stop reason: HOSPADM

## 2023-01-21 RX ORDER — MUPIROCIN 20 MG/G
OINTMENT TOPICAL 2 TIMES DAILY
Status: DISCONTINUED | OUTPATIENT
Start: 2023-01-21 | End: 2023-01-22

## 2023-01-21 RX ORDER — METFORMIN HYDROCHLORIDE 500 MG/1
500 TABLET ORAL 2 TIMES DAILY WITH MEALS
Status: DISCONTINUED | OUTPATIENT
Start: 2023-01-22 | End: 2023-02-02 | Stop reason: HOSPADM

## 2023-01-21 RX ORDER — FUROSEMIDE 20 MG/1
20 TABLET ORAL DAILY
Status: DISCONTINUED | OUTPATIENT
Start: 2023-01-21 | End: 2023-01-21

## 2023-01-21 RX ORDER — IPRATROPIUM BROMIDE 0.5 MG/2.5ML
0.5 SOLUTION RESPIRATORY (INHALATION) EVERY 4 HOURS
Status: DISCONTINUED | OUTPATIENT
Start: 2023-01-22 | End: 2023-02-02 | Stop reason: HOSPADM

## 2023-01-21 RX ORDER — BISACODYL 5 MG
10 TABLET, DELAYED RELEASE (ENTERIC COATED) ORAL 2 TIMES DAILY
Status: DISCONTINUED | OUTPATIENT
Start: 2023-01-22 | End: 2023-01-25

## 2023-01-21 RX ORDER — ACETYLCYSTEINE 600 MG
600 CAPSULE ORAL DAILY
Status: ON HOLD | COMMUNITY
End: 2023-02-02 | Stop reason: HOSPADM

## 2023-01-21 RX ORDER — NITROGLYCERIN 0.4 MG/1
0.4 TABLET SUBLINGUAL EVERY 5 MIN PRN
Status: DISCONTINUED | OUTPATIENT
Start: 2023-01-22 | End: 2023-02-02 | Stop reason: HOSPADM

## 2023-01-21 RX ORDER — OXYBUTYNIN CHLORIDE 5 MG/1
5 TABLET, EXTENDED RELEASE ORAL DAILY
Status: ON HOLD | COMMUNITY
End: 2023-02-02 | Stop reason: HOSPADM

## 2023-01-21 RX ORDER — PROPOFOL 10 MG/ML
0-50 INJECTION, EMULSION INTRAVENOUS CONTINUOUS
Status: DISCONTINUED | OUTPATIENT
Start: 2023-01-21 | End: 2023-02-02 | Stop reason: HOSPADM

## 2023-01-21 RX ORDER — DEXLANSOPRAZOLE 30 MG/1
1 CAPSULE, DELAYED RELEASE ORAL DAILY
Status: ON HOLD | COMMUNITY
Start: 2022-12-08 | End: 2023-02-02 | Stop reason: HOSPADM

## 2023-01-21 RX ORDER — FLUTICASONE PROPIONATE 50 MCG
1 SPRAY, SUSPENSION (ML) NASAL 2 TIMES DAILY
Status: ON HOLD | COMMUNITY
Start: 2022-12-16 | End: 2023-02-02 | Stop reason: HOSPADM

## 2023-01-21 RX ORDER — AMIODARONE HYDROCHLORIDE 200 MG/1
400 TABLET ORAL 3 TIMES DAILY
Status: DISCONTINUED | OUTPATIENT
Start: 2023-01-22 | End: 2023-02-02 | Stop reason: HOSPADM

## 2023-01-21 RX ORDER — LANOLIN ALCOHOL/MO/W.PET/CERES
1 CREAM (GRAM) TOPICAL 3 TIMES DAILY
Status: DISCONTINUED | OUTPATIENT
Start: 2023-01-22 | End: 2023-02-02 | Stop reason: HOSPADM

## 2023-01-21 RX ORDER — FUROSEMIDE 20 MG/1
30 TABLET ORAL DAILY
Status: ON HOLD | COMMUNITY
Start: 2023-01-11 | End: 2023-02-02 | Stop reason: HOSPADM

## 2023-01-21 RX ORDER — BUDESONIDE 0.5 MG/2ML
0.5 INHALANT ORAL EVERY 12 HOURS
Status: DISCONTINUED | OUTPATIENT
Start: 2023-01-22 | End: 2023-02-02 | Stop reason: HOSPADM

## 2023-01-21 RX ORDER — METFORMIN HYDROCHLORIDE 500 MG/1
500 TABLET ORAL 2 TIMES DAILY
Status: ON HOLD | COMMUNITY
Start: 2022-12-02 | End: 2023-02-02

## 2023-01-21 RX ORDER — ASCORBIC ACID 500 MG
500 TABLET ORAL 2 TIMES DAILY
Status: DISCONTINUED | OUTPATIENT
Start: 2023-01-22 | End: 2023-02-02 | Stop reason: HOSPADM

## 2023-01-21 RX ORDER — OXYBUTYNIN CHLORIDE 5 MG/1
5 TABLET, EXTENDED RELEASE ORAL DAILY
Status: DISCONTINUED | OUTPATIENT
Start: 2023-01-22 | End: 2023-02-02 | Stop reason: HOSPADM

## 2023-01-21 RX ORDER — ASPIRIN 81 MG/1
81 TABLET ORAL NIGHTLY
Status: ON HOLD | COMMUNITY
End: 2023-02-02 | Stop reason: HOSPADM

## 2023-01-21 RX ORDER — FAMOTIDINE 10 MG/ML
20 INJECTION INTRAVENOUS 2 TIMES DAILY
Status: DISCONTINUED | OUTPATIENT
Start: 2023-01-22 | End: 2023-02-02 | Stop reason: HOSPADM

## 2023-01-21 RX ORDER — ALBUTEROL SULFATE 90 UG/1
1 AEROSOL, METERED RESPIRATORY (INHALATION)
Status: ON HOLD | COMMUNITY
Start: 2022-11-23 | End: 2023-02-02 | Stop reason: HOSPADM

## 2023-01-21 RX ORDER — NOREPINEPHRINE BITARTRATE/D5W 8 MG/250ML
0-3 PLASTIC BAG, INJECTION (ML) INTRAVENOUS CONTINUOUS
Status: DISCONTINUED | OUTPATIENT
Start: 2023-01-21 | End: 2023-01-29

## 2023-01-21 RX ORDER — ACETAMINOPHEN 650 MG/1
650 SUPPOSITORY RECTAL EVERY 4 HOURS PRN
Status: DISCONTINUED | OUTPATIENT
Start: 2023-01-22 | End: 2023-02-02 | Stop reason: HOSPADM

## 2023-01-22 PROBLEM — R63.4 RECENT WEIGHT LOSS: Status: ACTIVE | Noted: 2023-01-22

## 2023-01-22 PROBLEM — D50.9 MICROCYTIC ANEMIA: Status: ACTIVE | Noted: 2023-01-22

## 2023-01-22 PROBLEM — I48.91 ATRIAL FIBRILLATION WITH RVR: Status: ACTIVE | Noted: 2023-01-22

## 2023-01-22 PROBLEM — R31.9 HEMATURIA: Status: ACTIVE | Noted: 2023-01-22

## 2023-01-22 PROBLEM — R74.01 TRANSAMINITIS: Status: ACTIVE | Noted: 2023-01-22

## 2023-01-22 PROBLEM — J96.02 ACUTE RESPIRATORY FAILURE WITH HYPOXIA AND HYPERCARBIA: Status: ACTIVE | Noted: 2023-01-22

## 2023-01-22 PROBLEM — A41.9 SEVERE SEPSIS WITH SEPTIC SHOCK: Status: ACTIVE | Noted: 2023-01-22

## 2023-01-22 PROBLEM — E87.6 HYPOKALEMIA: Status: ACTIVE | Noted: 2023-01-22

## 2023-01-22 PROBLEM — J96.01 ACUTE RESPIRATORY FAILURE WITH HYPOXIA AND HYPERCARBIA: Status: ACTIVE | Noted: 2023-01-22

## 2023-01-22 PROBLEM — R65.21 SEVERE SEPSIS WITH SEPTIC SHOCK: Status: ACTIVE | Noted: 2023-01-22

## 2023-01-22 LAB
ABS NEUT CALC (OHS): 12.01 X10(3)/MCL (ref 2.1–9.2)
ANION GAP SERPL CALC-SCNC: 5 MEQ/L (ref 2–13)
ANISOCYTOSIS BLD QL SMEAR: ABNORMAL
BASE EXCESS BLD CALC-SCNC: 16.6 MMOL/L
BASE EXCESS BLD CALC-SCNC: 17.1 MMOL/L
BASOPHILS # BLD AUTO: 0.01 X10(3)/MCL (ref 0.01–0.08)
BASOPHILS NFR BLD AUTO: 0.1 % (ref 0.1–1.2)
BLOOD GAS SAMPLE TYPE (OHS): ABNORMAL
BLOOD GAS SAMPLE TYPE (OHS): ABNORMAL
BUN SERPL-MCNC: 40 MG/DL (ref 7–20)
CALCIUM SERPL-MCNC: 7.5 MG/DL (ref 8.4–10.2)
CHLORIDE SERPL-SCNC: 91 MMOL/L (ref 98–110)
CK MB SERPL-MCNC: 5.55 NG/ML (ref 0–3.38)
CK SERPL-CCNC: 688 U/L (ref 30–135)
CO2 SERPL-SCNC: 44 MMOL/L (ref 21–32)
COHGB MFR BLDA: 0.3 % (ref 0–1.5)
COHGB MFR BLDA: 0.3 % (ref 0–1.5)
CREAT SERPL-MCNC: 1.2 MG/DL (ref 0.66–1.25)
CREAT/UREA NIT SERPL: 33 (ref 12–20)
EOSINOPHIL # BLD AUTO: 0.05 X10(3)/MCL (ref 0.04–0.36)
EOSINOPHIL NFR BLD AUTO: 0.4 % (ref 0.7–7)
ERYTHROCYTE [DISTWIDTH] IN BLOOD BY AUTOMATED COUNT: 23.6 % (ref 11–14.5)
FIO2 BLOOD GAS (OHS): 50 %
FIO2 BLOOD GAS (OHS): 50 %
GFR SERPLBLD CREATININE-BSD FMLA CKD-EPI: 50 MLS/MIN/1.73/M2
GLUCOSE SERPL-MCNC: 131 MG/DL (ref 70–115)
HCO3 BLDA-SCNC: 45.4 MMOL/L
HCO3 BLDA-SCNC: 45.5 MMOL/L
HCT VFR BLD AUTO: 31.5 % (ref 36–48)
HGB BLD-MCNC: 8.9 GM/DL (ref 11.8–16)
HYPOCHROMIA BLD QL SMEAR: ABNORMAL
IMM GRANULOCYTES # BLD AUTO: 0.06 X10(3)/MCL (ref 0–0.03)
IMM GRANULOCYTES NFR BLD AUTO: 0.5 % (ref 0–0.5)
LYMPHOCYTES # BLD AUTO: 0.32 X10(3)/MCL (ref 1.16–3.74)
LYMPHOCYTES NFR BLD AUTO: 2.4 % (ref 20–55)
LYMPHOCYTES NFR BLD MANUAL: 0.53 X10(3)/MCL
LYMPHOCYTES NFR BLD MANUAL: 4 % (ref 13–40)
MAGNESIUM SERPL-MCNC: 2.6 MG/DL (ref 1.8–2.4)
MCH RBC QN AUTO: 21.8 PG (ref 27–34)
MCV RBC AUTO: 77.2 FL (ref 79–99)
MEAN CELL HEMOGLOBIN CONCENTRATION (OHS) G/DL: 28.3 G/DL (ref 31–37)
MECH RR (OHS): 12 B/MIN
MECH RR (OHS): 8 B/MIN
MECH VT (OHS): 450 ML
MECH VT (OHS): 450 ML
METHGB MFR BLDA: 0.2 % (ref 0–1.5)
METHGB MFR BLDA: 0.7 % (ref 0–1.5)
MODE (OHS): ABNORMAL
MODE (OHS): ABNORMAL
MONOCYTES # BLD AUTO: 0.46 X10(3)/MCL (ref 0.24–0.36)
MONOCYTES NFR BLD AUTO: 3.5 % (ref 4.7–12.5)
MONOCYTES NFR BLD MANUAL: 0.66 X10(3)/MCL (ref 0.1–1.3)
MONOCYTES NFR BLD MANUAL: 5 % (ref 2–11)
NEUTROPHILS # BLD AUTO: 12.26 X10(3)/MCL (ref 1.56–6.13)
NEUTROPHILS NFR BLD AUTO: 93.1 % (ref 37–73)
NEUTROPHILS NFR BLD MANUAL: 91 % (ref 47–80)
NOTIFIED (OHS): ABNORMAL
NOTIFIED (OHS): ABNORMAL
NOTIFIED BY (OHS): ABNORMAL
NOTIFIED TIME (OHS): 1035
NOTIFIED TIME (OHS): 422
NRBC BLD AUTO-RTO: 0 % (ref 0–1)
O2 HB BLOOD GAS (OHS): 91.7 % (ref 94–100)
O2 HB BLOOD GAS (OHS): 97.1 % (ref 94–100)
OXYHGB MFR BLDA: 9.8 G/DL (ref 12–18)
OXYHGB MFR BLDA: 9.9 G/DL (ref 12–18)
PCO2 BLDA: 82.4 MMHG (ref 35–45)
PCO2 BLDA: 85.8 MMHG (ref 35–45)
PEEP (OHS): 5 CMH2O
PEEP (OHS): 5 CMH2O
PH BLDA: 7.34 [PH] (ref 7.38–7.49)
PH BLDA: 7.36 [PH] (ref 7.38–7.49)
PHOSPHATE SERPL-MCNC: 3.9 MG/DL (ref 2.5–4.9)
PLATELET # BLD AUTO: 267 X10(3)/MCL (ref 140–371)
PLATELET # BLD EST: ADEQUATE 10*3/UL
PMV BLD AUTO: 10.3 FL (ref 9.4–12.4)
PO2 BLDA: 108.2 MMHG (ref 80–100)
PO2 BLDA: 73.1 MMHG (ref 80–100)
POCT GLUCOSE: 124 MG/DL (ref 70–110)
POCT GLUCOSE: 132 MG/DL (ref 70–110)
POTASSIUM SERPL-SCNC: 2.5 MMOL/L (ref 3.5–5.1)
POTASSIUM SERPL-SCNC: 2.8 MMOL/L (ref 3.5–5.1)
PS (OHS): 10 CMH2O
RBC # BLD AUTO: 4.08 X10(6)/MCL (ref 4–5.1)
SAO2 % BLDA: 92.6 %
SAO2 % BLDA: 97.6 %
SODIUM SERPL-SCNC: 140 MMOL/L (ref 135–145)
TROPONIN I SERPL-MCNC: 0.06 NG/ML (ref 0–0.03)
WBC # SPEC AUTO: 13.2 X10(3)/MCL (ref 4–11.5)

## 2023-01-22 PROCEDURE — 99900026 HC AIRWAY MAINTENANCE (STAT)

## 2023-01-22 PROCEDURE — 80048 BASIC METABOLIC PNL TOTAL CA: CPT | Performed by: FAMILY MEDICINE

## 2023-01-22 PROCEDURE — 36415 COLL VENOUS BLD VENIPUNCTURE: CPT | Performed by: FAMILY MEDICINE

## 2023-01-22 PROCEDURE — 36600 WITHDRAWAL OF ARTERIAL BLOOD: CPT

## 2023-01-22 PROCEDURE — 94003 VENT MGMT INPAT SUBQ DAY: CPT

## 2023-01-22 PROCEDURE — 85025 COMPLETE CBC W/AUTO DIFF WBC: CPT | Performed by: FAMILY MEDICINE

## 2023-01-22 PROCEDURE — 25000003 PHARM REV CODE 250

## 2023-01-22 PROCEDURE — 25000003 PHARM REV CODE 250: Performed by: FAMILY MEDICINE

## 2023-01-22 PROCEDURE — 36600 WITHDRAWAL OF ARTERIAL BLOOD: CPT | Performed by: FAMILY MEDICINE

## 2023-01-22 PROCEDURE — 83735 ASSAY OF MAGNESIUM: CPT | Performed by: FAMILY MEDICINE

## 2023-01-22 PROCEDURE — 99900035 HC TECH TIME PER 15 MIN (STAT)

## 2023-01-22 PROCEDURE — 84484 ASSAY OF TROPONIN QUANT: CPT | Performed by: FAMILY MEDICINE

## 2023-01-22 PROCEDURE — 84100 ASSAY OF PHOSPHORUS: CPT | Performed by: FAMILY MEDICINE

## 2023-01-22 PROCEDURE — 84132 ASSAY OF SERUM POTASSIUM: CPT | Performed by: FAMILY MEDICINE

## 2023-01-22 PROCEDURE — 94761 N-INVAS EAR/PLS OXIMETRY MLT: CPT

## 2023-01-22 PROCEDURE — 85027 COMPLETE CBC AUTOMATED: CPT | Performed by: FAMILY MEDICINE

## 2023-01-22 PROCEDURE — 63600175 PHARM REV CODE 636 W HCPCS

## 2023-01-22 PROCEDURE — 94640 AIRWAY INHALATION TREATMENT: CPT

## 2023-01-22 PROCEDURE — 82550 ASSAY OF CK (CPK): CPT | Performed by: FAMILY MEDICINE

## 2023-01-22 PROCEDURE — 97530 THERAPEUTIC ACTIVITIES: CPT

## 2023-01-22 PROCEDURE — 27000221 HC OXYGEN, UP TO 24 HOURS

## 2023-01-22 PROCEDURE — 82803 BLOOD GASES ANY COMBINATION: CPT

## 2023-01-22 PROCEDURE — 82553 CREATINE MB FRACTION: CPT | Performed by: FAMILY MEDICINE

## 2023-01-22 PROCEDURE — 20000000 HC ICU ROOM

## 2023-01-22 PROCEDURE — 27200966 HC CLOSED SUCTION SYSTEM

## 2023-01-22 PROCEDURE — 25000242 PHARM REV CODE 250 ALT 637 W/ HCPCS

## 2023-01-22 RX ORDER — DEXTROSE MONOHYDRATE, SODIUM CHLORIDE, AND POTASSIUM CHLORIDE 50; 2.98; 9 G/1000ML; G/1000ML; G/1000ML
INJECTION, SOLUTION INTRAVENOUS CONTINUOUS
Status: DISCONTINUED | OUTPATIENT
Start: 2023-01-22 | End: 2023-01-23

## 2023-01-22 RX ORDER — DEXTROSE MONOHYDRATE, SODIUM CHLORIDE, AND POTASSIUM CHLORIDE 50; 2.98; 9 G/1000ML; G/1000ML; G/1000ML
INJECTION, SOLUTION INTRAVENOUS ONCE
Status: COMPLETED | OUTPATIENT
Start: 2023-01-22 | End: 2023-01-22

## 2023-01-22 RX ORDER — POTASSIUM CHLORIDE 20 MEQ/1
40 TABLET, EXTENDED RELEASE ORAL ONCE
Status: COMPLETED | OUTPATIENT
Start: 2023-01-22 | End: 2023-01-22

## 2023-01-22 RX ORDER — POTASSIUM CHLORIDE 20 MEQ/1
40 TABLET, EXTENDED RELEASE ORAL ONCE
Status: COMPLETED | OUTPATIENT
Start: 2023-01-23 | End: 2023-01-23

## 2023-01-22 RX ADMIN — THEOPHYLLINE ANHYDROUS 400 MG: 200 CAPSULE, EXTENDED RELEASE ORAL at 09:01

## 2023-01-22 RX ADMIN — BUDESONIDE INHALATION 0.5 MG: 0.5 SUSPENSION RESPIRATORY (INHALATION) at 09:01

## 2023-01-22 RX ADMIN — FERROUS SULFATE TAB 325 MG (65 MG ELEMENTAL FE) 1 EACH: 325 (65 FE) TAB at 09:01

## 2023-01-22 RX ADMIN — DOCUSATE SODIUM 100 MG: 100 CAPSULE, LIQUID FILLED ORAL at 09:01

## 2023-01-22 RX ADMIN — IRON SUCROSE 100 MG: 20 INJECTION, SOLUTION INTRAVENOUS at 09:01

## 2023-01-22 RX ADMIN — METFORMIN HYDROCHLORIDE 500 MG: 500 TABLET, FILM COATED ORAL at 05:01

## 2023-01-22 RX ADMIN — ATORVASTATIN CALCIUM 80 MG: 40 TABLET, FILM COATED ORAL at 09:01

## 2023-01-22 RX ADMIN — LEUCINE, PHENYLALANINE, LYSINE, METHIONINE, ISOLEUCINE, VALINE, HISTIDINE, THREONINE, TRYPTOPHAN, ALANINE, GLYCINE, ARGININE, PROLINE, SERINE, TYROSINE, SODIUM ACETATE, DIBASIC POTASSIUM PHOSPHATE, MAGNESIUM CHLORIDE, SODIUM CHLORIDE, CALCIUM CHLORIDE, DEXTROSE
311; 238; 247; 170; 255; 247; 204; 179; 77; 880; 438; 489; 289; 213; 17; 297; 261; 51; 77; 33; 5 INJECTION INTRAVENOUS at 08:01

## 2023-01-22 RX ADMIN — POTASSIUM CHLORIDE 40 MEQ: 20 TABLET, EXTENDED RELEASE ORAL at 09:01

## 2023-01-22 RX ADMIN — LACTULOSE 20 G: 20 SOLUTION ORAL at 01:01

## 2023-01-22 RX ADMIN — PIPERACILLIN SODIUM AND TAZOBACTAM SODIUM 4.5 G: 4; .5 INJECTION, POWDER, LYOPHILIZED, FOR SOLUTION INTRAVENOUS at 08:01

## 2023-01-22 RX ADMIN — LACTULOSE 20 G: 20 SOLUTION ORAL at 08:01

## 2023-01-22 RX ADMIN — AMIODARONE HYDROCHLORIDE 400 MG: 200 TABLET ORAL at 04:01

## 2023-01-22 RX ADMIN — LEVALBUTEROL HYDROCHLORIDE 1.25 MG: 1.25 SOLUTION, CONCENTRATE RESPIRATORY (INHALATION) at 05:01

## 2023-01-22 RX ADMIN — POLYETHYLENE GLYCOL 3350 17 G: 17 POWDER, FOR SOLUTION ORAL at 09:01

## 2023-01-22 RX ADMIN — IPRATROPIUM BROMIDE 0.5 MG: 0.5 SOLUTION RESPIRATORY (INHALATION) at 01:01

## 2023-01-22 RX ADMIN — LACTULOSE 20 G: 20 SOLUTION ORAL at 05:01

## 2023-01-22 RX ADMIN — OXYCODONE HYDROCHLORIDE AND ACETAMINOPHEN 500 MG: 500 TABLET ORAL at 09:01

## 2023-01-22 RX ADMIN — POTASSIUM CHLORIDE, DEXTROSE MONOHYDRATE AND SODIUM CHLORIDE 100 ML/HR: 300; 5; 900 INJECTION, SOLUTION INTRAVENOUS at 05:01

## 2023-01-22 RX ADMIN — PREGABALIN 50 MG: 25 CAPSULE ORAL at 04:01

## 2023-01-22 RX ADMIN — BUDESONIDE INHALATION 0.5 MG: 0.5 SUSPENSION RESPIRATORY (INHALATION) at 08:01

## 2023-01-22 RX ADMIN — LEVALBUTEROL HYDROCHLORIDE 1.25 MG: 1.25 SOLUTION, CONCENTRATE RESPIRATORY (INHALATION) at 09:01

## 2023-01-22 RX ADMIN — PREGABALIN 50 MG: 25 CAPSULE ORAL at 09:01

## 2023-01-22 RX ADMIN — OXYCODONE HYDROCHLORIDE AND ACETAMINOPHEN 500 MG: 500 TABLET ORAL at 08:01

## 2023-01-22 RX ADMIN — PIPERACILLIN SODIUM AND TAZOBACTAM SODIUM 4.5 G: 4; .5 INJECTION, POWDER, LYOPHILIZED, FOR SOLUTION INTRAVENOUS at 04:01

## 2023-01-22 RX ADMIN — LEVALBUTEROL HYDROCHLORIDE 1.25 MG: 1.25 SOLUTION, CONCENTRATE RESPIRATORY (INHALATION) at 01:01

## 2023-01-22 RX ADMIN — IPRATROPIUM BROMIDE 0.5 MG: 0.5 SOLUTION RESPIRATORY (INHALATION) at 03:01

## 2023-01-22 RX ADMIN — IPRATROPIUM BROMIDE 0.5 MG: 0.5 SOLUTION RESPIRATORY (INHALATION) at 08:01

## 2023-01-22 RX ADMIN — FERROUS SULFATE TAB 325 MG (65 MG ELEMENTAL FE) 1 EACH: 325 (65 FE) TAB at 08:01

## 2023-01-22 RX ADMIN — AMIODARONE HYDROCHLORIDE 400 MG: 200 TABLET ORAL at 09:01

## 2023-01-22 RX ADMIN — LACTULOSE 20 G: 20 SOLUTION ORAL at 09:01

## 2023-01-22 RX ADMIN — LEVALBUTEROL HYDROCHLORIDE 1.25 MG: 1.25 SOLUTION, CONCENTRATE RESPIRATORY (INHALATION) at 03:01

## 2023-01-22 RX ADMIN — FLUTICASONE PROPIONATE 50 MCG: 50 SPRAY, METERED NASAL at 09:01

## 2023-01-22 RX ADMIN — BISACODYL 10 MG: 5 TABLET, COATED ORAL at 08:01

## 2023-01-22 RX ADMIN — METFORMIN HYDROCHLORIDE 500 MG: 500 TABLET, FILM COATED ORAL at 09:01

## 2023-01-22 RX ADMIN — LINACLOTIDE 290 MCG: 290 CAPSULE, GELATIN COATED ORAL at 06:01

## 2023-01-22 RX ADMIN — FAMOTIDINE 20 MG: 10 INJECTION INTRAVENOUS at 09:01

## 2023-01-22 RX ADMIN — FERROUS SULFATE TAB 325 MG (65 MG ELEMENTAL FE) 1 EACH: 325 (65 FE) TAB at 04:01

## 2023-01-22 RX ADMIN — PROPOFOL 20 MCG/KG/MIN: 10 INJECTION, EMULSION INTRAVENOUS at 06:01

## 2023-01-22 RX ADMIN — POTASSIUM CHLORIDE 40 MEQ: 20 TABLET, EXTENDED RELEASE ORAL at 07:01

## 2023-01-22 RX ADMIN — PIPERACILLIN SODIUM AND TAZOBACTAM SODIUM 4.5 G: 4; .5 INJECTION, POWDER, LYOPHILIZED, FOR SOLUTION INTRAVENOUS at 12:01

## 2023-01-22 RX ADMIN — OXYBUTYNIN CHLORIDE 5 MG: 5 TABLET, EXTENDED RELEASE ORAL at 09:01

## 2023-01-22 RX ADMIN — IPRATROPIUM BROMIDE 0.5 MG: 0.5 SOLUTION RESPIRATORY (INHALATION) at 09:01

## 2023-01-22 RX ADMIN — FAMOTIDINE 20 MG: 10 INJECTION INTRAVENOUS at 08:01

## 2023-01-22 RX ADMIN — POTASSIUM CHLORIDE, DEXTROSE MONOHYDRATE AND SODIUM CHLORIDE: 300; 5; 900 INJECTION, SOLUTION INTRAVENOUS at 07:01

## 2023-01-22 RX ADMIN — BISACODYL 10 MG: 5 TABLET, COATED ORAL at 09:01

## 2023-01-22 RX ADMIN — LEVALBUTEROL HYDROCHLORIDE 1.25 MG: 1.25 SOLUTION, CONCENTRATE RESPIRATORY (INHALATION) at 08:01

## 2023-01-22 RX ADMIN — IPRATROPIUM BROMIDE 0.5 MG: 0.5 SOLUTION RESPIRATORY (INHALATION) at 05:01

## 2023-01-22 NOTE — CARE UPDATE
01/22/23 0329   Patient Assessment/Suction   Level of Consciousness (AVPU)   (sedated)   Respiratory Effort Normal   Expansion/Accessory Muscles/Retractions no retractions;no use of accessory muscles   LLL Breath Sounds coarse   RLL Breath Sounds wheezes, expiratory   Rhythm/Pattern, Respiratory assisted mechanically   Suction Method tracheal   $ Suction Charges Inline Suction Procedure Stat Charge;Close Suction System Equipment   Secretions Amount large   Secretions Color white;pink   Secretions Characteristics thick   Skin Integrity   $ Wound Care Tech Time 15 min   Area Observed Upper lip;Cheek;Lower lip   Skin Appearance without discoloration   PRE-TX-O2   Device (Oxygen Therapy) ventilator   $ Is the patient on Low Flow Oxygen? Yes   Oxygen Concentration (%) 50   SpO2 99 %   Pulse Oximetry Type Continuous   $ Pulse Oximetry - Multiple Charge Pulse Oximetry - Multiple   Pulse 65   Resp (!) 22   ETCO2   $ ETCO2 Usage Currently wearing   ETCO2 (mmHg) 61 mmHg   ETCO2 Device Type Ventilator;Bedside Monitor   Aerosol Therapy   $ Aerosol Therapy Charges Aerosol Treatment   Respiratory Treatment Status (SVN) given   Treatment Route (SVN) in-line;ventilator   Patient Position (SVN) HOB elevated   Signs of Intolerance (SVN) none   Breath Sounds Post-Respiratory Treatment   Throughout All Fields Post-Treatment All Fields   Throughout All Fields Post-Treatment crackles;wheezes, expiratory   Post-treatment Heart Rate (beats/min) 65   Post-treatment Resp Rate (breaths/min) 22        Airway - Non-Surgical   No placement date or time found.   Present Prior to Hospital Arrival?: No  Airway Device Size: 7.5  Style: Cuffed  Cuff Inflated With: Air  Inflation Amount (mL): 10  Depth of Insertion (cm): 21  Secured at: Lips   Secured at 21 cm   Measured At Lips   Secured Location Center   Secured by Commercial tube lerma   Site Condition Dry   Status Intact;Patent   Site Assessment Dry;Clean;No bleeding   Vent Select    Conventional Vent Y   $ Ventilator Subsequent 1   Charged w/in last 24h YES   Preset Conventional Ventilator Settings   Vent Type    Ventilation Type VCV   Vent Mode SIMV (VC) + PS   Humidity HME   Set Rate 8 BPM   Vt Set 450 mL   PEEP/CPAP 5 cmH20   Pressure Support 10 cmH20   Peak Flow 60 L/min   I-Trigger Type  V-TRIG   Trigger Sensitivity Flow/I-Trigger 1 L/min   Patient Ventilator Parameters   Resp Rate Total 17 br/min   Peak Airway Pressure 29 cmH20   Mean Airway Pressure 8.8 cmH20   Exhaled Vt 286 mL   Total Ve 5.2 L/m   Conventional Ventilator Alarms   Alarms On Y   Ve High Alarm 31.5 L/min   Ve Low Alarm 2.05 L/min   Vt High Alarm 730 mL   Vt Low Alarm 210 mL   Resp Rate High Alarm 40 br/min   Apnea Rate 10   Apnea Volume (mL) 450 mL   Apnea Oxygen Concentration  100   Ready to Wean/Extubation Screen   FIO2<=50 (chart decimal) 0.5   MV<16L (chart vol.) 5.2   PEEP <=8 (chart #) 5   Ready to Wean Parameters   F/VT Ratio<105 (RSBI) (!) 76.92   PT BS CRACKLES, RN JORGE NOTIFIED

## 2023-01-22 NOTE — PLAN OF CARE
Problem: Adult Inpatient Plan of Care  Goal: Plan of Care Review  Outcome: Ongoing, Progressing  Goal: Patient-Specific Goal (Individualized)  Outcome: Ongoing, Progressing  Goal: Absence of Hospital-Acquired Illness or Injury  Outcome: Ongoing, Progressing  Goal: Optimal Comfort and Wellbeing  Outcome: Ongoing, Progressing  Goal: Readiness for Transition of Care  Outcome: Ongoing, Progressing     Problem: Infection  Goal: Absence of Infection Signs and Symptoms  Outcome: Ongoing, Progressing     Problem: Communication Impairment (Mechanical Ventilation, Invasive)  Goal: Effective Communication  Outcome: Ongoing, Progressing     Problem: Device-Related Complication Risk (Mechanical Ventilation, Invasive)  Goal: Optimal Device Function  Outcome: Ongoing, Progressing     Problem: Nutrition Impairment (Mechanical Ventilation, Invasive)  Goal: Optimal Nutrition Delivery  Outcome: Ongoing, Progressing     Problem: Skin and Tissue Injury (Mechanical Ventilation, Invasive)  Goal: Absence of Device-Related Skin and Tissue Injury  Outcome: Ongoing, Progressing     Problem: Ventilator-Induced Lung Injury (Mechanical Ventilation, Invasive)  Goal: Absence of Ventilator-Induced Lung Injury  Outcome: Ongoing, Progressing     Problem: Fall Injury Risk  Goal: Absence of Fall and Fall-Related Injury  Outcome: Ongoing, Progressing     Problem: Skin Injury Risk Increased  Goal: Skin Health and Integrity  Outcome: Ongoing, Progressing

## 2023-01-22 NOTE — PLAN OF CARE
Problem: Physical Therapy  Goal: Physical Therapy Goal  Description: Goals to be met by: discharge     Patient will increase functional independence with mobility by performin. Supine to sit with MInimal Assistance    Outcome: Ongoing, Progressing

## 2023-01-22 NOTE — PT/OT/SLP PROGRESS
Physical Therapy  Treatment    Michel De La Paz   MRN: [id number]   Admitting Diagnosis: <principal problem not specified>    PT Received On: 01/22/23  PT Start Time: 0900     PT Stop Time: 0915    PT Total Time (min): 15 min       Billable Minutes:  Therapeutic Activity 15    Treatment Type: Treatment  PT/PTA: PT             General Precautions: Standard,    Orthopedic Precautions:    Braces:    Respiratory Status:  ventilator         Subjective:  Communicated with nursing prior to session.           Objective:        Functional Mobility:  Bed Mobility:        Transfers:       Gait:        Stairs:          Balance:   Static Sit:   Dynamic Sit:   Static Stand:   Dynamic stand:        Treatment and Education:  PROM performed to all extremities to increase ROM and decrease risk of contractures.      AM-PAC 6 CLICK MOBILITY  How much help from another person does this patient currently need?   1 = Unable, Total/Dependent Assistance  2 = A lot, Maximum/Moderate Assistance  3 = A little, Minimum/Contact Guard/Supervision  4 = None, Modified [place]/Independent         AM-PAC Raw Score CMS G-Code Modifier Level of Impairment Assistance   6 % Total / Unable   7 - 9 CM 80 - 100% Maximal Assist   10 - 14 CL 60 - 80% Moderate Assist   15 - 19 CK 40 - 60% Moderate Assist   20 - 22 CJ 20 - 40% Minimal Assist   23 CI 1-20% SBA / CGA   24 CH 0% Independent/ Mod I     Patient left supine with all lines intact.    Assessment:  Michel De La Paz is a 67 y.o. female with a medical diagnosis of respiratory failure and presents with weakness, currently on ventilator.    Rehab identified problem list/impairments: weakness, impaired endurance, impaired functional mobility, impaired balance    Rehab potential is good.    Activity tolerance: Good    Discharge recommendations: nursing facility, skilled, rehabilitation facility, LTACH (long-term acute care hospital)      Barriers to discharge:      Equipment recommendations:        GOALS:   Multidisciplinary Problems       Physical Therapy Goals          Problem: Physical Therapy    Goal Priority Disciplines Outcome Goal Variances Interventions   Physical Therapy Goal     PT, PT/OT Ongoing, Progressing     Description: Goals to be met by: discharge     Patient will increase functional independence with mobility by performin. Supine to sit with MInimal Assistance                         PLAN:    Patient to be seen daily to address the above listed problems via therapeutic activities  Plan of Care expires:    Plan of Care reviewed with:           2023

## 2023-01-22 NOTE — PROGRESS NOTES
ABG DRAWN @ 10:27    PH 7.341  PCO2 85.8  PO2 108.2  HCO 45.4  BE 16.6  CTCO2 48.0  SPO2 97.6        SIMV  PS10    RR 12  PEEP 5  FIO2 50%

## 2023-01-22 NOTE — RESPIRATORY THERAPY
ABG'S DRAWN AND RESULTED ON SIMV 8, , PEEP 5, PS 10, FIO2 50%    PH 7.36  PCO2 82.4  PO2 73.1  HCO3 45/5  BE 17.1  CTCO2 48.0  HB 9.9  SO2 92.6      VENT RATE INCREASED TO 12 POST ABGS PER PROTOCOL  
No

## 2023-01-23 LAB
ABS NEUT CALC (OHS): 9.83 X10(3)/MCL (ref 2.1–9.2)
ANION GAP SERPL CALC-SCNC: 2 MEQ/L (ref 2–13)
ANISOCYTOSIS BLD QL SMEAR: ABNORMAL
BASE EXCESS BLD CALC-SCNC: 12.5 MMOL/L
BLOOD GAS SAMPLE TYPE (OHS): ABNORMAL
BUN SERPL-MCNC: 32 MG/DL (ref 7–20)
CALCIUM SERPL-MCNC: 7.6 MG/DL (ref 8.4–10.2)
CHLORIDE SERPL-SCNC: 99 MMOL/L (ref 98–110)
CO2 SERPL-SCNC: 42 MMOL/L (ref 21–32)
COHGB MFR BLDA: 0.4 % (ref 0–1.5)
CREAT SERPL-MCNC: 0.95 MG/DL (ref 0.66–1.25)
CREAT/UREA NIT SERPL: 34 (ref 12–20)
ERYTHROCYTE [DISTWIDTH] IN BLOOD BY AUTOMATED COUNT: 24.2 % (ref 11–14.5)
FIO2 BLOOD GAS (OHS): 50 %
GFR SERPLBLD CREATININE-BSD FMLA CKD-EPI: 66 MLS/MIN/1.73/M2
GLUCOSE SERPL-MCNC: 138 MG/DL (ref 70–115)
HCO3 BLDA-SCNC: 37.2 MMOL/L
HCT VFR BLD AUTO: 30.1 % (ref 36–48)
HEMOCCULT SP1 STL QL: NEGATIVE
HGB BLD-MCNC: 8.5 GM/DL (ref 11.8–16)
HYPOCHROMIA BLD QL SMEAR: ABNORMAL
IMM GRANULOCYTES # BLD AUTO: 0.06 X10(3)/MCL (ref 0–0.03)
IMM GRANULOCYTES NFR BLD AUTO: 0.6 % (ref 0–0.5)
LYMPHOCYTES NFR BLD MANUAL: 0.43 X10(3)/MCL
LYMPHOCYTES NFR BLD MANUAL: 4 % (ref 13–40)
MAGNESIUM SERPL-MCNC: 2.7 MG/DL (ref 1.8–2.4)
MCH RBC QN AUTO: 21.9 PG (ref 27–34)
MCV RBC AUTO: 77.6 FL (ref 79–99)
MEAN CELL HEMOGLOBIN CONCENTRATION (OHS) G/DL: 28.2 G/DL (ref 31–37)
MECH RR (OHS): 20 B/MIN
MECH VT (OHS): 450 ML
METAMYELOCYTES NFR BLD MANUAL: 1 %
METHGB MFR BLDA: 0.5 % (ref 0–1.5)
MODE (OHS): AC
MONOCYTES NFR BLD MANUAL: 0.54 X10(3)/MCL (ref 0.1–1.3)
MONOCYTES NFR BLD MANUAL: 5 % (ref 2–11)
NEUTROPHILS NFR BLD MANUAL: 90 % (ref 47–80)
NOTIFIED (OHS): ABNORMAL
NRBC BLD AUTO-RTO: 0 % (ref 0–1)
O2 HB BLOOD GAS (OHS): 96.4 % (ref 94–100)
OXYHGB MFR BLDA: 9.5 G/DL (ref 12–18)
PCO2 BLDA: 49.6 MMHG (ref 35–45)
PEEP (OHS): 5 CMH2O
PH BLDA: 7.49 [PH] (ref 7.38–7.49)
PLATELET # BLD AUTO: 221 X10(3)/MCL (ref 140–371)
PLATELET # BLD EST: ADEQUATE 10*3/UL
PMV BLD AUTO: 10.1 FL (ref 9.4–12.4)
PO2 BLDA: 97.2 MMHG (ref 80–100)
POCT GLUCOSE: 115 MG/DL (ref 70–110)
POCT GLUCOSE: 120 MG/DL (ref 70–110)
POCT GLUCOSE: 134 MG/DL (ref 70–110)
POTASSIUM SERPL-SCNC: 3.2 MMOL/L (ref 3.5–5.1)
RBC # BLD AUTO: 3.88 X10(6)/MCL (ref 4–5.1)
SAO2 % BLDA: 97.3 %
SODIUM SERPL-SCNC: 143 MMOL/L (ref 135–145)
WBC # SPEC AUTO: 10.8 X10(3)/MCL (ref 4–11.5)

## 2023-01-23 PROCEDURE — 99900035 HC TECH TIME PER 15 MIN (STAT)

## 2023-01-23 PROCEDURE — 25000242 PHARM REV CODE 250 ALT 637 W/ HCPCS

## 2023-01-23 PROCEDURE — 20000000 HC ICU ROOM

## 2023-01-23 PROCEDURE — 83735 ASSAY OF MAGNESIUM: CPT | Performed by: FAMILY MEDICINE

## 2023-01-23 PROCEDURE — 27200966 HC CLOSED SUCTION SYSTEM

## 2023-01-23 PROCEDURE — 25000003 PHARM REV CODE 250: Performed by: FAMILY MEDICINE

## 2023-01-23 PROCEDURE — 36415 COLL VENOUS BLD VENIPUNCTURE: CPT | Performed by: FAMILY MEDICINE

## 2023-01-23 PROCEDURE — 36600 WITHDRAWAL OF ARTERIAL BLOOD: CPT

## 2023-01-23 PROCEDURE — 27000221 HC OXYGEN, UP TO 24 HOURS

## 2023-01-23 PROCEDURE — 80048 BASIC METABOLIC PNL TOTAL CA: CPT | Performed by: FAMILY MEDICINE

## 2023-01-23 PROCEDURE — 63600175 PHARM REV CODE 636 W HCPCS: Performed by: FAMILY MEDICINE

## 2023-01-23 PROCEDURE — 97530 THERAPEUTIC ACTIVITIES: CPT | Mod: CQ

## 2023-01-23 PROCEDURE — 63600175 PHARM REV CODE 636 W HCPCS

## 2023-01-23 PROCEDURE — 25000003 PHARM REV CODE 250

## 2023-01-23 PROCEDURE — 94003 VENT MGMT INPAT SUBQ DAY: CPT

## 2023-01-23 PROCEDURE — 99900026 HC AIRWAY MAINTENANCE (STAT)

## 2023-01-23 PROCEDURE — 85027 COMPLETE CBC AUTOMATED: CPT | Performed by: FAMILY MEDICINE

## 2023-01-23 PROCEDURE — 94640 AIRWAY INHALATION TREATMENT: CPT

## 2023-01-23 PROCEDURE — 82803 BLOOD GASES ANY COMBINATION: CPT

## 2023-01-23 PROCEDURE — 82272 OCCULT BLD FECES 1-3 TESTS: CPT | Performed by: FAMILY MEDICINE

## 2023-01-23 PROCEDURE — 94761 N-INVAS EAR/PLS OXIMETRY MLT: CPT

## 2023-01-23 RX ORDER — THEOPHYLLINE 400 MG/1
400 TABLET, EXTENDED RELEASE ORAL DAILY
Status: DISCONTINUED | OUTPATIENT
Start: 2023-01-24 | End: 2023-02-02 | Stop reason: HOSPADM

## 2023-01-23 RX ORDER — POTASSIUM CHLORIDE 20 MEQ/1
40 TABLET, EXTENDED RELEASE ORAL DAILY
Status: DISCONTINUED | OUTPATIENT
Start: 2023-01-23 | End: 2023-01-24

## 2023-01-23 RX ADMIN — IPRATROPIUM BROMIDE 0.5 MG: 0.5 SOLUTION RESPIRATORY (INHALATION) at 12:01

## 2023-01-23 RX ADMIN — OXYCODONE HYDROCHLORIDE AND ACETAMINOPHEN 500 MG: 500 TABLET ORAL at 08:01

## 2023-01-23 RX ADMIN — LEVALBUTEROL HYDROCHLORIDE 1.25 MG: 1.25 SOLUTION, CONCENTRATE RESPIRATORY (INHALATION) at 12:01

## 2023-01-23 RX ADMIN — IPRATROPIUM BROMIDE 0.5 MG: 0.5 SOLUTION RESPIRATORY (INHALATION) at 11:01

## 2023-01-23 RX ADMIN — BISACODYL 10 MG: 5 TABLET, COATED ORAL at 08:01

## 2023-01-23 RX ADMIN — BUDESONIDE INHALATION 0.5 MG: 0.5 SUSPENSION RESPIRATORY (INHALATION) at 07:01

## 2023-01-23 RX ADMIN — FERROUS SULFATE TAB 325 MG (65 MG ELEMENTAL FE) 1 EACH: 325 (65 FE) TAB at 08:01

## 2023-01-23 RX ADMIN — FERROUS SULFATE TAB 325 MG (65 MG ELEMENTAL FE) 1 EACH: 325 (65 FE) TAB at 03:01

## 2023-01-23 RX ADMIN — NOREPINEPHRINE BITARTRATE 0.12 MCG/KG/MIN: 8 INJECTION, SOLUTION INTRAVENOUS at 10:01

## 2023-01-23 RX ADMIN — LACTULOSE 20 G: 20 SOLUTION ORAL at 08:01

## 2023-01-23 RX ADMIN — POLYETHYLENE GLYCOL 3350 17 G: 17 POWDER, FOR SOLUTION ORAL at 08:01

## 2023-01-23 RX ADMIN — LEVALBUTEROL HYDROCHLORIDE 1.25 MG: 1.25 SOLUTION, CONCENTRATE RESPIRATORY (INHALATION) at 07:01

## 2023-01-23 RX ADMIN — IPRATROPIUM BROMIDE 0.5 MG: 0.5 SOLUTION RESPIRATORY (INHALATION) at 08:01

## 2023-01-23 RX ADMIN — METFORMIN HYDROCHLORIDE 500 MG: 500 TABLET, FILM COATED ORAL at 05:01

## 2023-01-23 RX ADMIN — PIPERACILLIN SODIUM AND TAZOBACTAM SODIUM 4.5 G: 4; .5 INJECTION, POWDER, LYOPHILIZED, FOR SOLUTION INTRAVENOUS at 03:01

## 2023-01-23 RX ADMIN — METFORMIN HYDROCHLORIDE 500 MG: 500 TABLET, FILM COATED ORAL at 08:01

## 2023-01-23 RX ADMIN — AMIODARONE HYDROCHLORIDE 400 MG: 200 TABLET ORAL at 03:01

## 2023-01-23 RX ADMIN — IRON SUCROSE 100 MG: 20 INJECTION, SOLUTION INTRAVENOUS at 01:01

## 2023-01-23 RX ADMIN — PREGABALIN 50 MG: 25 CAPSULE ORAL at 08:01

## 2023-01-23 RX ADMIN — PROPOFOL 20 MCG/KG/MIN: 10 INJECTION, EMULSION INTRAVENOUS at 01:01

## 2023-01-23 RX ADMIN — METHYLNALTREXONE BROMIDE 12 MG: 12 INJECTION, SOLUTION SUBCUTANEOUS at 01:01

## 2023-01-23 RX ADMIN — POTASSIUM CHLORIDE 40 MEQ: 20 TABLET, EXTENDED RELEASE ORAL at 08:01

## 2023-01-23 RX ADMIN — POTASSIUM CHLORIDE 40 MEQ: 1500 TABLET, EXTENDED RELEASE ORAL at 01:01

## 2023-01-23 RX ADMIN — PIPERACILLIN SODIUM AND TAZOBACTAM SODIUM 4.5 G: 4; .5 INJECTION, POWDER, LYOPHILIZED, FOR SOLUTION INTRAVENOUS at 01:01

## 2023-01-23 RX ADMIN — THEOPHYLLINE ANHYDROUS 400 MG: 200 CAPSULE, EXTENDED RELEASE ORAL at 08:01

## 2023-01-23 RX ADMIN — FAMOTIDINE 20 MG: 10 INJECTION INTRAVENOUS at 08:01

## 2023-01-23 RX ADMIN — LEVALBUTEROL HYDROCHLORIDE 1.25 MG: 1.25 SOLUTION, CONCENTRATE RESPIRATORY (INHALATION) at 04:01

## 2023-01-23 RX ADMIN — FLUTICASONE PROPIONATE 50 MCG: 50 SPRAY, METERED NASAL at 08:01

## 2023-01-23 RX ADMIN — LACTULOSE 20 G: 20 SOLUTION ORAL at 01:01

## 2023-01-23 RX ADMIN — IPRATROPIUM BROMIDE 0.5 MG: 0.5 SOLUTION RESPIRATORY (INHALATION) at 07:01

## 2023-01-23 RX ADMIN — NOREPINEPHRINE BITARTRATE 0.1 MCG/KG/MIN: 8 INJECTION, SOLUTION INTRAVENOUS at 03:01

## 2023-01-23 RX ADMIN — IPRATROPIUM BROMIDE 0.5 MG: 0.5 SOLUTION RESPIRATORY (INHALATION) at 04:01

## 2023-01-23 RX ADMIN — OXYBUTYNIN CHLORIDE 5 MG: 5 TABLET, EXTENDED RELEASE ORAL at 08:01

## 2023-01-23 RX ADMIN — LACTULOSE 20 G: 20 SOLUTION ORAL at 05:01

## 2023-01-23 RX ADMIN — LEUCINE, PHENYLALANINE, LYSINE, METHIONINE, ISOLEUCINE, VALINE, HISTIDINE, THREONINE, TRYPTOPHAN, ALANINE, GLYCINE, ARGININE, PROLINE, SERINE, TYROSINE, SODIUM ACETATE, DIBASIC POTASSIUM PHOSPHATE, MAGNESIUM CHLORIDE, SODIUM CHLORIDE, CALCIUM CHLORIDE, DEXTROSE
311; 238; 247; 170; 255; 247; 204; 179; 77; 880; 438; 489; 289; 213; 17; 297; 261; 51; 77; 33; 5 INJECTION INTRAVENOUS at 09:01

## 2023-01-23 RX ADMIN — PREGABALIN 50 MG: 25 CAPSULE ORAL at 03:01

## 2023-01-23 RX ADMIN — PIPERACILLIN SODIUM AND TAZOBACTAM SODIUM 4.5 G: 4; .5 INJECTION, POWDER, LYOPHILIZED, FOR SOLUTION INTRAVENOUS at 08:01

## 2023-01-23 RX ADMIN — AMIODARONE HYDROCHLORIDE 400 MG: 200 TABLET ORAL at 08:01

## 2023-01-23 RX ADMIN — LEVALBUTEROL HYDROCHLORIDE 1.25 MG: 1.25 SOLUTION, CONCENTRATE RESPIRATORY (INHALATION) at 08:01

## 2023-01-23 RX ADMIN — ATORVASTATIN CALCIUM 80 MG: 40 TABLET, FILM COATED ORAL at 08:01

## 2023-01-23 NOTE — PLAN OF CARE
PHYSICIAN ORDERED TO CONSULT FOR LTAC. REFERRAL MADE TO [facility] PER PHONE/FAX, SPOKE WITH GONZÁLEZ CAZARES RN.

## 2023-01-23 NOTE — PT/OT/SLP PROGRESS
Physical Therapy Treatment    Patient Name:  Michel De La Paz   MRN:  [id number]    Recommendations:     Discharge Recommendations: nursing facility, skilled, rehabilitation facility, LTACH (long-term acute care hospital)  Discharge Equipment Recommendations:    Barriers to discharge: None    Assessment:     Michel De La Paz is a 67 y.o. female admitted with a medical diagnosis of Acute respiratory failure with hypoxia and hypercarbia.  She presents with the following impairments/functional limitations: weakness, impaired endurance, impaired functional mobility, impaired balance .    Rehab Prognosis: Poor; patient would benefit from acute skilled PT services to address these deficits and reach maximum level of function.    Recent Surgery: * No surgery found *      Plan:     During this hospitalization, patient to be seen daily to address the identified rehab impairments via therapeutic activities and progress toward the following goals:    Plan of Care Expires:       Subjective     Chief Complaint: pt unable to state due to pt on vent  Patient/Family Comments/goals: unable to state  Pain/Comfort:  Pain Rating 1: 0/10      Objective:     Communicated with nurse prior to session.  Patient found right sidelying with bed alarm, blood pressure cuff, waterman catheter, oxygen, ventilator upon PT entry to room.     General Precautions: Standard,    Orthopedic Precautions:    Braces:    Respiratory Status:  vent     Functional Mobility:        AM-PAC 6 CLICK MOBILITY  Turning over in bed (including adjusting bedclothes, sheets and blankets)?: 1  Sitting down on and standing up from a chair with arms (e.g., wheelchair, bedside commode, etc.): 1  Moving from lying on back to sitting on the side of the bed?: 1  Moving to and from a bed to a chair (including a wheelchair)?: 1  Need to walk in hospital room?: 1  Climbing 3-5 steps with a railing?: 1  Basic Mobility Total Score: 6       Treatment & Education:  Pt performed bilatateral  UE/LE AAROM    Patient left right sidelying with all lines intact, call button in reach, and bed alarm on..    GOALS:   Multidisciplinary Problems       Physical Therapy Goals          Problem: Physical Therapy    Goal Priority Disciplines Outcome Goal Variances Interventions   Physical Therapy Goal     PT, PT/OT Ongoing, Progressing     Description: Goals to be met by: discharge     Patient will increase functional independence with mobility by performin. Supine to sit with MInimal Assistance                         Time Tracking:     PT Received On: 23  PT Start Time: 715     PT Stop Time: 730  PT Total Time (min): 15 min     Billable Minutes: Therapeutic Activity 15    Treatment Type: Treatment  PT/PTA: PTA     PTA Visit Number: 1     2023

## 2023-01-23 NOTE — ASSESSMENT & PLAN NOTE
Recently been worked up by Urology has some small stones family reports other workup was negative including what sounds like a endoscopy or bladder scope

## 2023-01-23 NOTE — HOSPITAL COURSE
68 yo F admitted overnight with worsening weakenss, sob and tachycardia. H/  H low initially and got 2 U pRBC overnight. H/H stable this and trend up.  EKG atrial fibrillation with rvr better.    01/15   HR better controlled overnight on amiodarone increased TID    01/16 c/o constipation overngiht and this AM  iron low on panel  HR stable overnight  01/17/2023 pt c/o no bm x 2 weeks.  H&H has been stable  01/18/2023 denies pain, no BM yet, H&H stable, very weak and unsteady gait, working with PT.  01/19/2023 pt had large BM today, still very weak, got SOB overnight placed on 7L oximizer due to drop in oxygen sats.  Echo EF 55-60% and CXR shows pulmonary edema.  Pt c/o feeling weak.  Surgery plans for EGD in am.  pt has had recent weight loss about 4 kng since April, was 58 kg now 54, BMI down to 20.  01/20/2023 pt more obtunded overnight this am with increased weakness and not following commands, stat ABG done showed PCO2 elevated and acute respiratory acidosis, transferred pt to ICU and placed on BIPAP, 1 hr ABG very minimal improvement and pt GSC of 6 low with minimally responsive withdraws to pain, no verbal responses  01/21/2023 pt stable on vent overnight, abg this am looks good, will start to wean, CXR no infiltrates, blood and urine cx repeated and pending.     01/22/2023 patient remained stable on the vent were able to wean her rate a bit however her pCO2 did go up to the 80s ABG is still reasonable at 7.3  but with pCO2 very high in the 80  we put him back on AC with a rate of 16  01/23/2023 patient stable overnight her ABG actually looks better this a.m. her pCO2 has normalized after we switched her back to AC with a rate is 16 she is tolerating currently her FiO2 is at 50% respiratory rate is 20 she is still on the Levophed at 13.5 mics and getting Clinimix at 40 and propofol at 20  01/24/2023 tolerating ventilator wean overnight FiO2 50% down  01/25/2023 FiO2 down to 40% this AM -assist control  Awake  and alert on vent minimal sedation  01/26/2023 stable on simv overnight but PCO2 trending up to 56.2 off AC mode  Awakening with sedation down  Cxr stable   Blood counts improved  01/27/2023 extubated this AM tolerated for short time  Developed acute respiratory distress O2 sats 60's minimally responive   Reintubated by anthesia  01/28/2023 abg stable on simv this Am   AWAKE AND ALERT  No more cough  Pain better   Tube feeds for the rec's talked to ivelisse will start slow  Not tolerated 1st time again  01/29/2023  No major change overnight remain on simv 30%   Abg ok this AM   01/30/2023  Unable to wean vent overnight  Tolerating tube feeds  01/31/2023 pt was extubated on 01/27, did not tolerate, unable to wean in last several days, working on LTAC placement.  Still on levophed 2.2 mcg, no other changes.  Feeding currently at 40 goal is 55.   Still on TPN, dietary recs are followed.  Still on diprivan at 35  02/01/2023 patient is weaned off of the Diprivan and she is much more awake 21 2023. Patient is off the sedation her to prevent has been weighing. Shes much more waking arousable.  02/2/2023 patient was admitted initially admitted on 01/15 with AFib with RVR she had abdominal pain constipation and anemia as well she was given 2 units of packed red cells she had some increasing shortness of breath was placed on a 7 L Oxymizer on 01/19 overnight chest x-ray repeated showed pulmonary edema and she was plan for EGD on the 20th however she had increasing respiratory distress became more obtunded on the a.m. of 1/20 ABG showed hypercapnia she was intubated and transferred to ICU after a trial of BiPAP did not improve her hypercapnia she was admitted to ICU placed on broad-spectrum antibiotics repeat cultures were done chest x-ray she was weaned down she was extubated on 01/27 however after only about 20 minutes being off the vent she had significant stridorous respirations and drops in her sats down to 50% she was  immediately reintubated and has been back on the vent she is now weaned back down to IMV settings she is awake and arousable and cooperative on the vent able to follow commands I spoke with Dr. Kinney at [facility] she will be transferred there for pulmonology services and further care as needed

## 2023-01-23 NOTE — ASSESSMENT & PLAN NOTE
More p.o. and recheck levels in a.m.Patient with Paroxysmal (<7 days) atrial fibrillation which is controlled currently with Amiodarone. Patient is currently in sinus rhythm.[id number] Score: 3. HASBLED Score:  . Anticoagulation not indicated due to anemia.    Her heart rate has been stable currently she is 63 and sinus

## 2023-01-23 NOTE — PROGRESS NOTES
01/23/23 0831        Altered Skin Integrity 01/23/23 0530 Right Coccyx #1 Purple or maroon localized area of discolored intact skin or non-intact skin or a blood-filled blister.   Date First Assessed/Time First Assessed: 01/23/23 0530   Altered Skin Integrity Present on Admission: suspected hospital acquired  Side: Right  Location: Coccyx  Wound Number: #1  Is this injury device related?: No  Description of Altered Skin Integri...   Description of Altered Skin Integrity Purple or maroon localized area of discolored intact skin or non-intact skin or a blood-filled blister.   Dressing Appearance Intact;Dry   Drainage Amount None   Appearance Intact;Purple;Maroon   Tissue loss description Not applicable   Periwound Area Intact;Redness  (blanchable and nonblanchable)   Wound Length (cm) 9.5 cm   Wound Width (cm) 14 cm   Wound Depth (cm) 0 cm   Wound Volume (cm^3) 0 cm^3   Wound Surface Area (cm^2) 133 cm^2   Care Applied:;Removed:   Dressing Foam   Dressing Change Due 01/28/23

## 2023-01-23 NOTE — CONSULTS
[facility]    Patient Name:  Michel De La Paz   MRN:  [id number]  Date: 1/23/2023  Diagnosis: Acute respiratory failure with hypoxia and hypercarbia    History:     Past Medical History:   Diagnosis Date    CHF (congestive heart failure)     COPD (chronic obstructive pulmonary disease)     Diverticular disease of large intestine without perforation or abscess     DM (diabetes mellitus)     GERD (gastroesophageal reflux disease)     Mild intermittent asthma, uncomplicated     Neuropathy     Sleep apnea        Social History     Socioeconomic History    Marital status: Unknown   Tobacco Use    Smoking status: Never   Substance and Sexual Activity    Alcohol use: Never       Precautions:     Allergies as of 01/21/2023 - Reviewed 01/21/2023   Allergen Reaction Noted    Benadryl [diphenhydramine hcl]  01/21/2023    Darvocet a500 [propoxyphene n-acetaminophen]  01/21/2023       WOC Assessment Details/Treatment      Appears to be a deep tissue pressure injury.  Allevyn life in place.  Plan to change every 5 days or prn soilage.        01/23/2023

## 2023-01-23 NOTE — ASSESSMENT & PLAN NOTE
This patient does have evidence of infective focus  My overall impression is septic shock. Vital signs were reviewed and noted in progress note.  Antibiotics given-   Antibiotics (From admission, onward)    Start     Stop Route Frequency Ordered    01/22/23 0400  piperacillin-tazobactam (ZOSYN) 4.5 g in dextrose 5 % in water (D5W) 5 % 100 mL IVPB (MB+)         -- IV Every 8 hours (non-standard times) 01/21/23 1521        Cultures were taken-   Microbiology Results (last 7 days)     ** No results found for the last 168 hours. **        Latest lactate reviewed, they are-  No results for input(s): LACTATE in the last 72 hours.    Organ dysfunction indicated by Acute kidney injury, Encephalopathy  and Acute respiratory failure  Source-      Source control Achieved by- iv abx and This patient does have evidence of infective focus  My overall impression is septic shock.  Source:  Sputum culture from intubation is pending shows 3 organisms growing rare amounts will follow up  Antibiotics given-   Antibiotics (From admission, onward)    Start     Stop Route Frequency Ordered    01/22/23 0400  piperacillin-tazobactam (ZOSYN) 4.5 g in dextrose 5 % in water (D5W) 5 % 100 mL IVPB (MB+)         -- IV Every 8 hours (non-standard times) 01/21/23 1521        Latest lactate reviewed-  No results for input(s): LACTATE in the last 72 hours.  Organ dysfunction indicated by Acute kidney injury and Acute respiratory failure    Shock with decreased perfusion noted, Fluid challenge  was given at 30cc/kg and was not given at 30cc/kg due to      Post- resuscitation assessment- (Done after fluids given for shock)  Vital signs post fluid administrations were-  Temp Readings from Last 1 Encounters:   01/23/23 99.4 °F (37.4 °C)     BP Readings from Last 1 Encounters:   01/23/23 132/72     Pulse Readings from Last 1 Encounters:   01/23/23 68       Perfusion exam was performed within 6 hours of septic shock presentation after bolus shows Adequate  tissue perfusion assessed by non-invasive monitoring  Will Start Pressors- Levophed for MAP of 65  Source control achieved by:  Iv abx   echo done showed EF of 60-65% with normal right  Ventricular function   CT angio chest done on admission did not show any PE and no acute finding     CT of the abdomen done on admit showed some small renal calculi no other acute finding    Patient remains on the Levophed at 13.5 mils per hour which is a 0.12 milligram/kilogram per minute dose we will continue and wean as tolerated

## 2023-01-23 NOTE — SUBJECTIVE & OBJECTIVE
Interval History:      Review of Systems   Pt intubated and sedated  Objective:     Vital Signs (Most Recent):  Temp: 96.9 °F (36.1 °C) (01/22/23 1800)  Pulse: 60 (01/22/23 1900)  Resp: 20 (01/22/23 1703)  BP: (!) 77/42 (01/22/23 1900)  SpO2: 100 % (01/22/23 1900)   Vital Signs (24h Range):  Temp:  [96.9 °F (36.1 °C)-98.3 °F (36.8 °C)] 96.9 °F (36.1 °C)  Pulse:  [59-74] 60  Resp:  [16-22] 20  SpO2:  [89 %-100 %] 100 %  BP: ()/(42-82) 77/42     Weight: 49.6 kg (109 lb 4.9 oz)  Body mass index is 18.76 kg/m².    Intake/Output Summary (Last 24 hours) at 1/22/2023 1924  Last data filed at 1/22/2023 1903  Gross per 24 hour   Intake 3861 ml   Output 1925 ml   Net 1936 ml      Physical Exam  GENERAL: does not respond, eyes open withdraws to pain,intubated and sedated  CHEST: No rubs or wheezes. No use of accessory muscles. Non labored. diminished breath sounds on left  CARDIOVASCULAR: Regular rate and rhythm, no systolic or diastolic murmur, no rubs or gallops  ABDOMEN: Soft, non-tender, non-distended,     EXTREMITIES: No edema  NEUROLOGICAL:  intubated and sedated         Significant Labs: All pertinent labs within the past 24 hours have been reviewed.  BMP:   Recent Labs   Lab 01/22/23 0423 01/22/23 1905     --    K 2.5* 2.8*   CO2 44*  --    BUN 40.0*  --    CREATININE 1.20  --    CALCIUM 7.5*  --    MG 2.60*  --      CBC:   Recent Labs   Lab 01/22/23 0423   WBC 13.2*   HGB 8.9*   HCT 31.5*          Significant Imaging: I have reviewed all pertinent imaging results/findings within the past 24 hours.

## 2023-01-23 NOTE — HPI
66 yo F admitted overnight with worsening weakenss, sob and tachycardia. H/  H low initially and got 2 U pRBC overnight. H/H stable this and trend up.  EKG atrial fibrillation with rvr better.

## 2023-01-23 NOTE — ASSESSMENT & PLAN NOTE
Patient was scheduled for EGD and had a transfusion at the time of admission here however EGD was postponed due to acute respiratory failure with hypercarbia requiring intubation she will follow up with Dr. Abel once he is more stable  H&H remains stable will continue to monitor

## 2023-01-23 NOTE — ASSESSMENT & PLAN NOTE
Patient with Hypercapnic and Hypoxic Respiratory failure which is Acute on chronic.  she is not on home oxygen. Supplemental oxygen was provided and noted- Vent Mode: A/C  Oxygen Concentration (%):  [50] 50  Resp Rate Total:  [12 br/min-21 br/min] 20 br/min  Vt Set:  [450 mL] 450 mL  PEEP/CPAP:  [5 cmH20] 5 cmH20  Pressure Support:  [10 cmH20] 10 cmH20  Mean Airway Pressure:  [9.2 zxC28-27 cmH20] 12 cmH20.   Signs/symptoms of respiratory failure include- tachypnea, increased work of breathing and respiratory distress. Contributing diagnoses includes - CHF and COPD Labs and images were reviewed. Patient Has recent ABG, which has been reviewed. Will treat underlying causes and adjust management of respiratory failure as follows-     She will remain on the AC, get our case management consult to attempt for LTAC placement continue all other meds and treatment including her leave albuterol nebs IV steroids IV antibiotics follow up on cultures as they report

## 2023-01-23 NOTE — ASSESSMENT & PLAN NOTE
Patient was scheduled for EGD and had a transfusion at the time of admission here however EGD was postponed due to acute respiratory failure with hypercarbia requiring intubation she will follow up with Dr. Abel once he is more stable

## 2023-01-23 NOTE — ASSESSMENT & PLAN NOTE
Nutrition consulted. Most recent weight and BMI monitored-  Patient will have EGD and colonoscopy once stabilized from a respiratory standpoint                        Measurements:  Wt Readings from Last 1 Encounters:   01/23/23 51.1 kg (112 lb 11.2 oz)   Body mass index is 19.34 kg/m².    Recommendations:      Patient has been screened and assessed by RD. RD will follow patient.

## 2023-01-23 NOTE — ASSESSMENT & PLAN NOTE
Patient with Hypercapnic and Hypoxic Respiratory failure which is Acute on chronic.  she is not on home oxygen. Supplemental oxygen was provided and noted- Vent Mode: A/C  Oxygen Concentration (%):  [40-50] 50  Resp Rate Total:  [11 br/min-25 br/min] 20 br/min  Vt Set:  [450 mL] 450 mL  PEEP/CPAP:  [5 cmH20] 5 cmH20  Pressure Support:  [10 cmH20] 10 cmH20  Mean Airway Pressure:  [8.8 joZ43-61 cmH20] 11 cmH20.   Signs/symptoms of respiratory failure include- tachypnea, increased work of breathing and respiratory distress. Contributing diagnoses includes - CHF and COPD Labs and images were reviewed. Patient Has recent ABG, which has been reviewed. Will treat underlying causes and adjust management of respiratory failure as follows-     currently she is back on AC rate due to hypercapnia we will continue vent management and wean as

## 2023-01-23 NOTE — ASSESSMENT & PLAN NOTE
This patient does have evidence of infective focus  My overall impression is septic shock. Vital signs were reviewed and noted in progress note.  Antibiotics given-   Antibiotics (From admission, onward)    Start     Stop Route Frequency Ordered    01/22/23 0400  piperacillin-tazobactam (ZOSYN) 4.5 g in dextrose 5 % in water (D5W) 5 % 100 mL IVPB (MB+)         -- IV Every 8 hours (non-standard times) 01/21/23 1521        Cultures were taken-   Microbiology Results (last 7 days)     ** No results found for the last 168 hours. **        Latest lactate reviewed, they are-  No results for input(s): LACTATE in the last 72 hours.    Organ dysfunction indicated by Acute kidney injury, Encephalopathy  and Acute respiratory failure  Source-      Source control Achieved by- iv abx and This patient does have evidence of infective focus  My overall impression is septic shock.  Source:  Sputum culture from intubation is pending shows 3 organisms growing rare amounts will follow up  Antibiotics given-   Antibiotics (From admission, onward)    Start     Stop Route Frequency Ordered    01/22/23 0400  piperacillin-tazobactam (ZOSYN) 4.5 g in dextrose 5 % in water (D5W) 5 % 100 mL IVPB (MB+)         -- IV Every 8 hours (non-standard times) 01/21/23 1521        Latest lactate reviewed-  No results for input(s): LACTATE in the last 72 hours.  Organ dysfunction indicated by Acute kidney injury and Acute respiratory failure    Shock with decreased perfusion noted, Fluid challenge  was given at 30cc/kg and was not given at 30cc/kg due to      Post- resuscitation assessment- (Done after fluids given for shock)  Vital signs post fluid administrations were-  Temp Readings from Last 1 Encounters:   01/22/23 96.9 °F (36.1 °C)     BP Readings from Last 1 Encounters:   01/22/23 (!) 81/44     Pulse Readings from Last 1 Encounters:   01/22/23 60       Perfusion exam was performed within 6 hours of septic shock presentation after bolus shows  Adequate tissue perfusion assessed by non-invasive monitoring  Will Start Pressors- Levophed for MAP of 65  Source control achieved by:  Iv abx   echo done showed EF of 60-65% with normal right  Ventricular function   CT angio chest done on admission did not show any PE and no acute finding     CT of the abdomen done on admit showed some small renal calculi no other acute finding

## 2023-01-23 NOTE — ASSESSMENT & PLAN NOTE
Her potassium is improved we will continue the p.o. replacement for now recheck labs in [facility].

## 2023-01-23 NOTE — PROGRESS NOTES
[facility]  Progress Note    Patient Name: Michel De La Paz  MRN: [id number]  Patient Class: IP- Inpatient   Admission Date: 1/13/2023  Length of Stay: 9 days  Attending Physician: Ac Childers MD  Primary Care Provider: Bandar An MD        Subjective:     Principal Problem:Acute respiratory failure with hypoxia and hypercarbia        HPI:   66 yo F admitted overnight with worsening weakenss, sob and tachycardia. H/  H low initially and got 2 U pRBC overnight. H/H stable this and trend up.  EKG atrial fibrillation with rvr better.            Overview/Hospital Course:   66 yo F admitted overnight with worsening weakenss, sob and tachycardia. H/  H low initially and got 2 U pRBC overnight. H/H stable this and trend up.  EKG atrial fibrillation with rvr better.    01/15   HR better controlled overnight on amiodarone increased TID    01/16 c/o constipation overngiht and this AM  iron low on panel  HR stable overnight  01/17/2023 pt c/o no bm x 2 weeks.  H&H has been stable  01/18/2023 denies pain, no BM yet, H&H stable, very weak and unsteady gait, working with PT.  01/19/2023 pt had large BM today, still very weak, got SOB overnight placed on 7L oximizer due to drop in oxygen sats.  Echo EF 55-60% and CXR shows pulmonary edema.  Pt c/o feeling weak.  Surgery plans for EGD in am.  pt has had recent weight loss about 4 kng since April, was 58 kg now 54, BMI down to 20.  01/20/2023 pt more obtunded overnight this am with increased weakness and not following commands, stat ABG done showed PCO2 elevated and acute respiratory acidosis, transferred pt to ICU and placed on BIPAP, 1 hr ABG very minimal improvement and pt GSC of 6 low with minimally responsive withdraws to pain, no verbal responses  01/21/2023 pt stable on vent overnight, abg this am looks good, will start to wean, CXR no infiltrates, blood and urine cx repeated and pending.     01/22/2023 patient remained stable on  the vent were able to wean her rate a bit however her pCO2 did go up to the 80s ABG is still reasonable at 7.3  but with pCO2 very high in the 80  we put him back on AC with a rate of 16      Interval History:      Review of Systems   Pt intubated and sedated  Objective:     Vital Signs (Most Recent):  Temp: 96.9 °F (36.1 °C) (01/22/23 1800)  Pulse: 60 (01/22/23 1900)  Resp: 20 (01/22/23 1703)  BP: (!) 77/42 (01/22/23 1900)  SpO2: 100 % (01/22/23 1900)   Vital Signs (24h Range):  Temp:  [96.9 °F (36.1 °C)-98.3 °F (36.8 °C)] 96.9 °F (36.1 °C)  Pulse:  [59-74] 60  Resp:  [16-22] 20  SpO2:  [89 %-100 %] 100 %  BP: ()/(42-82) 77/42     Weight: 49.6 kg (109 lb 4.9 oz)  Body mass index is 18.76 kg/m².    Intake/Output Summary (Last 24 hours) at 1/22/2023 1924  Last data filed at 1/22/2023 1903  Gross per 24 hour   Intake 3861 ml   Output 1925 ml   Net 1936 ml      Physical Exam  GENERAL: does not respond, eyes open withdraws to pain,intubated and sedated  CHEST: No rubs or wheezes. No use of accessory muscles. Non labored. diminished breath sounds on left  CARDIOVASCULAR: Regular rate and rhythm, no systolic or diastolic murmur, no rubs or gallops  ABDOMEN: Soft, non-tender, non-distended,     EXTREMITIES: No edema  NEUROLOGICAL:  intubated and sedated         Significant Labs: All pertinent labs within the past 24 hours have been reviewed.  BMP:   Recent Labs   Lab 01/22/23 0423 01/22/23 1905     --    K 2.5* 2.8*   CO2 44*  --    BUN 40.0*  --    CREATININE 1.20  --    CALCIUM 7.5*  --    MG 2.60*  --      CBC:   Recent Labs   Lab 01/22/23 0423   WBC 13.2*   HGB 8.9*   HCT 31.5*          Significant Imaging: I have reviewed all pertinent imaging results/findings within the past 24 hours.         Assessment/Plan:      * Acute respiratory failure with hypoxia and hypercarbia  Patient with Hypercapnic and Hypoxic Respiratory failure which is Acute on chronic.  she is not on home oxygen. Supplemental  oxygen was provided and noted- Vent Mode: A/C  Oxygen Concentration (%):  [40-50] 50  Resp Rate Total:  [11 br/min-25 br/min] 20 br/min  Vt Set:  [450 mL] 450 mL  PEEP/CPAP:  [5 cmH20] 5 cmH20  Pressure Support:  [10 cmH20] 10 cmH20  Mean Airway Pressure:  [8.8 tjB48-23 cmH20] 11 cmH20.   Signs/symptoms of respiratory failure include- tachypnea, increased work of breathing and respiratory distress. Contributing diagnoses includes - CHF and COPD Labs and images were reviewed. Patient Has recent ABG, which has been reviewed. Will treat underlying causes and adjust management of respiratory failure as follows-     currently she is back on AC rate due to hypercapnia we will continue vent management and wean as     Severe sepsis with septic shock  This patient does have evidence of infective focus  My overall impression is septic shock. Vital signs were reviewed and noted in progress note.  Antibiotics given-   Antibiotics (From admission, onward)    Start     Stop Route Frequency Ordered    01/22/23 0400  piperacillin-tazobactam (ZOSYN) 4.5 g in dextrose 5 % in water (D5W) 5 % 100 mL IVPB (MB+)         -- IV Every 8 hours (non-standard times) 01/21/23 1521        Cultures were taken-   Microbiology Results (last 7 days)     ** No results found for the last 168 hours. **        Latest lactate reviewed, they are-  No results for input(s): LACTATE in the last 72 hours.    Organ dysfunction indicated by Acute kidney injury, Encephalopathy  and Acute respiratory failure  Source-      Source control Achieved by- iv abx and This patient does have evidence of infective focus  My overall impression is septic shock.  Source:  Sputum culture from intubation is pending shows 3 organisms growing rare amounts will follow up  Antibiotics given-   Antibiotics (From admission, onward)    Start     Stop Route Frequency Ordered    01/22/23 0400  piperacillin-tazobactam (ZOSYN) 4.5 g in dextrose 5 % in water (D5W) 5 % 100 mL IVPB (MB+)          -- IV Every 8 hours (non-standard times) 01/21/23 1521        Latest lactate reviewed-  No results for input(s): LACTATE in the last 72 hours.  Organ dysfunction indicated by Acute kidney injury and Acute respiratory failure    Shock with decreased perfusion noted, Fluid challenge  was given at 30cc/kg and was not given at 30cc/kg due to      Post- resuscitation assessment- (Done after fluids given for shock)  Vital signs post fluid administrations were-  Temp Readings from Last 1 Encounters:   01/22/23 96.9 °F (36.1 °C)     BP Readings from Last 1 Encounters:   01/22/23 (!) 81/44     Pulse Readings from Last 1 Encounters:   01/22/23 60       Perfusion exam was performed within 6 hours of septic shock presentation after bolus shows Adequate tissue perfusion assessed by non-invasive monitoring  Will Start Pressors- Levophed for MAP of 65  Source control achieved by:  Iv abx   echo done showed EF of 60-65% with normal right  Ventricular function   CT angio chest done on admission did not show any PE and no acute finding     CT of the abdomen done on admit showed some small renal calculi no other acute finding        Hypokalemia     replaced p.o. repeat level is 2.8 we will give More p.o. and recheck levels in a.m    Atrial fibrillation with RVR  More p.o. and recheck levels in a.m.Patient with Paroxysmal (<7 days) atrial fibrillation which is controlled currently with Amiodarone. Patient is currently in sinus rhythm.[id number] Score: 3. HASBLED Score:  . Anticoagulation not indicated due to anemia.        Microcytic anemia   Patient was scheduled for EGD and had a transfusion at the time of admission here however EGD was postponed due to acute respiratory failure with hypercarbia requiring intubation she will follow up with Dr. Abel once he is more stable      Hematuria   Recently been worked up by Urology has some small stones family reports other workup was negative including what sounds like a endoscopy  or bladder scope      Transaminitis   Improved will recheck labs      Recent weight loss  Nutrition consulted. Most recent weight and BMI monitored-  Patient will have EGD and colonoscopy once stabilized from a respiratory standpoint                        Measurements:  Wt Readings from Last 1 Encounters:   01/21/23 49.6 kg (109 lb 4.9 oz)   Body mass index is 18.76 kg/m².    Recommendations:      Patient has been screened and assessed by RD. RD will follow patient.        VTE Risk Mitigation (From admission, onward)         Ordered     Place sequential compression device  Until discontinued         01/21/23 1326                Discharge Planning   LASHAWN:      Code Status: Full Code   Is the patient medically ready for discharge?:     Reason for patient still in hospital (select all that apply): Patient trending condition, Treatment, Consult recommendations and Pending disposition                     Megan Iglesias MD  Department of Hospital Medicine   [facility]

## 2023-01-23 NOTE — ASSESSMENT & PLAN NOTE
Nutrition consulted. Most recent weight and BMI monitored-  Patient will have EGD and colonoscopy once stabilized from a respiratory standpoint                        Measurements:  Wt Readings from Last 1 Encounters:   01/21/23 49.6 kg (109 lb 4.9 oz)   Body mass index is 18.76 kg/m².    Recommendations:      Patient has been screened and assessed by RD. RD will follow patient.

## 2023-01-23 NOTE — SUBJECTIVE & OBJECTIVE
Interval History:      Review of Systems  Patient is sedated on ventilator unable to as monitoring to questions  Objective:     Vital Signs (Most Recent):  Temp: 99.4 °F (37.4 °C) (01/23/23 0600)  Pulse: 68 (01/23/23 0800)  Resp: 20 (01/23/23 0741)  BP: 132/72 (01/23/23 0800)  SpO2: 100 % (01/23/23 0800) Vital Signs (24h Range):  Temp:  [96.9 °F (36.1 °C)-99.4 °F (37.4 °C)] 99.4 °F (37.4 °C)  Pulse:  [59-69] 68  Resp:  [16-20] 20  SpO2:  [99 %-100 %] 100 %  BP: ()/(42-82) 132/72     Weight: 51.1 kg (112 lb 11.2 oz)  Body mass index is 19.34 kg/m².    Intake/Output Summary (Last 24 hours) at 1/23/2023 0841  Last data filed at 1/23/2023 0510  Gross per 24 hour   Intake 3631 ml   Output 2000 ml   Net 1631 ml      Physical Exam  Physical Exam  GENERAL: does not respond, eyes open withdraws to pain,intubated and sedated  CHEST:  Few scattered wheezes overall much improved and less tight No use of accessory muscles. Non labored. diminished breath sounds on left  CARDIOVASCULAR: Regular rate and rhythm, no systolic or diastolic murmur, no rubs or gallops  ABDOMEN: Soft, non-tender, non-distended,     EXTREMITIES: No edema  NEUROLOGICAL:  intubated and sedated         Significant Labs: All pertinent labs within the past 24 hours have been reviewed.  BMP:   Recent Labs   Lab 01/23/23  0430      K 3.2*   CO2 42*   BUN 32.0*   CREATININE 0.95   CALCIUM 7.6*   MG 2.70*     CBC:   Recent Labs   Lab 01/22/23  0423 01/23/23  0431   WBC 13.2* 10.8   HGB 8.9* 8.5*   HCT 31.5* 30.1*    221       Significant Imaging: I have reviewed all pertinent imaging results/findings within the past 24 hours.

## 2023-01-23 NOTE — PROGRESS NOTES
[facility] Medicine  Progress Note    Patient Name: Michel De La Paz  MRN: [id number]  Patient Class: IP- Inpatient   Admission Date: 1/13/2023  Length of Stay: 10 days  Attending Physician: Ac Childers MD  Primary Care Provider: Bandar An MD        Subjective:     Principal Problem:Acute respiratory failure with hypoxia and hypercarbia        HPI:   68 yo F admitted overnight with worsening weakenss, sob and tachycardia. H/  H low initially and got 2 U pRBC overnight. H/H stable this and trend up.  EKG atrial fibrillation with rvr better.            Overview/Hospital Course:   68 yo F admitted overnight with worsening weakenss, sob and tachycardia. H/  H low initially and got 2 U pRBC overnight. H/H stable this and trend up.  EKG atrial fibrillation with rvr better.    01/15   HR better controlled overnight on amiodarone increased TID    01/16 c/o constipation overngiht and this AM  iron low on panel  HR stable overnight  01/17/2023 pt c/o no bm x 2 weeks.  H&H has been stable  01/18/2023 denies pain, no BM yet, H&H stable, very weak and unsteady gait, working with PT.  01/19/2023 pt had large BM today, still very weak, got SOB overnight placed on 7L oximizer due to drop in oxygen sats.  Echo EF 55-60% and CXR shows pulmonary edema.  Pt c/o feeling weak.  Surgery plans for EGD in am.  pt has had recent weight loss about 4 kng since April, was 58 kg now 54, BMI down to 20.  01/20/2023 pt more obtunded overnight this am with increased weakness and not following commands, stat ABG done showed PCO2 elevated and acute respiratory acidosis, transferred pt to ICU and placed on BIPAP, 1 hr ABG very minimal improvement and pt GSC of 6 low with minimally responsive withdraws to pain, no verbal responses  01/21/2023 pt stable on vent overnight, abg this am looks good, will start to wean, CXR no infiltrates, blood and urine cx repeated and pending.     01/22/2023 patient remained stable on  the vent were able to wean her rate a bit however her pCO2 did go up to the 80s ABG is still reasonable at 7.3  but with pCO2 very high in the 80  we put him back on AC with a rate of 16  01/23/2023 patient stable overnight her ABG actually looks better this a.m. her pCO2 has normalized after we switched her back to AC with a rate is 16 she is tolerating currently her FiO2 is at 50% respiratory rate is 20 she is still on the Levophed at 13.5 mics and getting Clinimix at 40 and propofol at 20       Interval History:      Review of Systems  Patient is sedated on ventilator unable to as monitoring to questions  Objective:     Vital Signs (Most Recent):  Temp: 99.4 °F (37.4 °C) (01/23/23 0600)  Pulse: 68 (01/23/23 0800)  Resp: 20 (01/23/23 0741)  BP: 132/72 (01/23/23 0800)  SpO2: 100 % (01/23/23 0800) Vital Signs (24h Range):  Temp:  [96.9 °F (36.1 °C)-99.4 °F (37.4 °C)] 99.4 °F (37.4 °C)  Pulse:  [59-69] 68  Resp:  [16-20] 20  SpO2:  [99 %-100 %] 100 %  BP: ()/(42-82) 132/72     Weight: 51.1 kg (112 lb 11.2 oz)  Body mass index is 19.34 kg/m².    Intake/Output Summary (Last 24 hours) at 1/23/2023 0841  Last data filed at 1/23/2023 0510  Gross per 24 hour   Intake 3631 ml   Output 2000 ml   Net 1631 ml      Physical Exam  Physical Exam  GENERAL: does not respond, eyes open withdraws to pain,intubated and sedated  CHEST:  Few scattered wheezes overall much improved and less tight No use of accessory muscles. Non labored. diminished breath sounds on left  CARDIOVASCULAR: Regular rate and rhythm, no systolic or diastolic murmur, no rubs or gallops  ABDOMEN: Soft, non-tender, non-distended,     EXTREMITIES: No edema  NEUROLOGICAL:  intubated and sedated         Significant Labs: All pertinent labs within the past 24 hours have been reviewed.  BMP:   Recent Labs   Lab 01/23/23  0430      K 3.2*   CO2 42*   BUN 32.0*   CREATININE 0.95   CALCIUM 7.6*   MG 2.70*     CBC:   Recent Labs   Lab 01/22/23  4153  01/23/23  0431   WBC 13.2* 10.8   HGB 8.9* 8.5*   HCT 31.5* 30.1*    221       Significant Imaging: I have reviewed all pertinent imaging results/findings within the past 24 hours.         Assessment/Plan:      * Acute respiratory failure with hypoxia and hypercarbia  Patient with Hypercapnic and Hypoxic Respiratory failure which is Acute on chronic.  she is not on home oxygen. Supplemental oxygen was provided and noted- Vent Mode: A/C  Oxygen Concentration (%):  [50] 50  Resp Rate Total:  [12 br/min-21 br/min] 20 br/min  Vt Set:  [450 mL] 450 mL  PEEP/CPAP:  [5 cmH20] 5 cmH20  Pressure Support:  [10 cmH20] 10 cmH20  Mean Airway Pressure:  [9.2 cmI18-63 cmH20] 12 cmH20.   Signs/symptoms of respiratory failure include- tachypnea, increased work of breathing and respiratory distress. Contributing diagnoses includes - CHF and COPD Labs and images were reviewed. Patient Has recent ABG, which has been reviewed. Will treat underlying causes and adjust management of respiratory failure as follows-     She will remain on the AC, get our case management consult to attempt for LTAC placement continue all other meds and treatment including her leave albuterol nebs IV steroids IV antibiotics follow up on cultures as they report    Severe sepsis with septic shock  This patient does have evidence of infective focus  My overall impression is septic shock. Vital signs were reviewed and noted in progress note.  Antibiotics given-   Antibiotics (From admission, onward)    Start     Stop Route Frequency Ordered    01/22/23 0400  piperacillin-tazobactam (ZOSYN) 4.5 g in dextrose 5 % in water (D5W) 5 % 100 mL IVPB (MB+)         -- IV Every 8 hours (non-standard times) 01/21/23 1521        Cultures were taken-   Microbiology Results (last 7 days)     ** No results found for the last 168 hours. **        Latest lactate reviewed, they are-  No results for input(s): LACTATE in the last 72 hours.    Organ dysfunction indicated by  Acute kidney injury, Encephalopathy  and Acute respiratory failure  Source-      Source control Achieved by- iv abx and This patient does have evidence of infective focus  My overall impression is septic shock.  Source:  Sputum culture from intubation is pending shows 3 organisms growing rare amounts will follow up  Antibiotics given-   Antibiotics (From admission, onward)    Start     Stop Route Frequency Ordered    01/22/23 0400  piperacillin-tazobactam (ZOSYN) 4.5 g in dextrose 5 % in water (D5W) 5 % 100 mL IVPB (MB+)         -- IV Every 8 hours (non-standard times) 01/21/23 1521        Latest lactate reviewed-  No results for input(s): LACTATE in the last 72 hours.  Organ dysfunction indicated by Acute kidney injury and Acute respiratory failure    Shock with decreased perfusion noted, Fluid challenge  was given at 30cc/kg and was not given at 30cc/kg due to      Post- resuscitation assessment- (Done after fluids given for shock)  Vital signs post fluid administrations were-  Temp Readings from Last 1 Encounters:   01/23/23 99.4 °F (37.4 °C)     BP Readings from Last 1 Encounters:   01/23/23 132/72     Pulse Readings from Last 1 Encounters:   01/23/23 68       Perfusion exam was performed within 6 hours of septic shock presentation after bolus shows Adequate tissue perfusion assessed by non-invasive monitoring  Will Start Pressors- Levophed for MAP of 65  Source control achieved by:  Iv abx   echo done showed EF of 60-65% with normal right  Ventricular function   CT angio chest done on admission did not show any PE and no acute finding     CT of the abdomen done on admit showed some small renal calculi no other acute finding    Patient remains on the Levophed at 13.5 mils per hour which is a 0.12 milligram/kilogram per minute dose we will continue and wean as tolerated        Hypokalemia    Her potassium is improved we will continue the p.o. replacement for now recheck labs in a.m.    Atrial fibrillation with  RVR  More p.o. and recheck levels in a.m.Patient with Paroxysmal (<7 days) atrial fibrillation which is controlled currently with Amiodarone. Patient is currently in sinus rhythm.[id number] Score: 3. HASBLED Score:  . Anticoagulation not indicated due to anemia.    Her heart rate has been stable currently she is 62 and sinus    Microcytic anemia   Patient was scheduled for EGD and had a transfusion at the time of admission here however EGD was postponed due to acute respiratory failure with hypercarbia requiring intubation she will follow up with Dr. Abel once he is more stable  H&H remains stable will continue to monitor      Hematuria   Recently been worked up by Urology has some small stones family reports other workup was negative including what sounds like a endoscopy or bladder scope      Transaminitis   Improved will recheck labs CMP in a.m.      Recent weight loss  Nutrition consulted. Most recent weight and BMI monitored-  Patient will have EGD and colonoscopy once stabilized from a respiratory standpoint                        Measurements:  Wt Readings from Last 1 Encounters:   01/23/23 51.1 kg (112 lb 11.2 oz)   Body mass index is 19.34 kg/m².    Recommendations:      Patient has been screened and assessed by RD. RD will follow patient.        VTE Risk Mitigation (From admission, onward)         Ordered     Place sequential compression device  Until discontinued         01/21/23 1326                Discharge Planning   LASHAWN:      Code Status: Full Code   Is the patient medically ready for discharge?:     Reason for patient still in hospital (select all that apply): Patient trending condition, Treatment, Consult recommendations, PT / OT recommendations and Pending disposition           Critical care 42 minutes          Megan Iglesias MD  Department of Hospital Medicine   [facility]

## 2023-01-23 NOTE — ASSESSMENT & PLAN NOTE
More p.o. and recheck levels in a.m.Patient with Paroxysmal (<7 days) atrial fibrillation which is controlled currently with Amiodarone. Patient is currently in sinus rhythm.[id number] Score: 3. HASBLED Score:  . Anticoagulation not indicated due to anemia.

## 2023-01-24 ENCOUNTER — TELEPHONE (OUTPATIENT)
Dept: FAMILY MEDICINE | Facility: CLINIC | Age: 68
End: 2023-01-24
Payer: MEDICARE

## 2023-01-24 LAB
ABS NEUT CALC (OHS): 8.46 X10(3)/MCL (ref 2.1–9.2)
ALBUMIN SERPL-MCNC: 2.7 G/DL (ref 3.4–5)
ALBUMIN/GLOB SERPL: 0.9 RATIO
ALP SERPL-CCNC: 59 UNIT/L (ref 50–144)
ALT SERPL-CCNC: 45 UNIT/L (ref 1–45)
ANISOCYTOSIS BLD QL SMEAR: ABNORMAL
AST SERPL-CCNC: 78 UNIT/L (ref 14–36)
BASE EXCESS BLD CALC-SCNC: 11.2 MMOL/L
BILIRUBIN DIRECT+TOT PNL SERPL-MCNC: 0.6 MG/DL (ref 0–1)
BLOOD GAS SAMPLE TYPE (OHS): ABNORMAL
BUN SERPL-MCNC: 24 MG/DL (ref 7–20)
CALCIUM SERPL-MCNC: 8 MG/DL (ref 8.4–10.2)
CHLORIDE SERPL-SCNC: 99 MMOL/L (ref 98–110)
CO2 SERPL-SCNC: 40 MMOL/L (ref 21–32)
COHGB MFR BLDA: 0.6 % (ref 0–1.5)
CREAT SERPL-MCNC: 0.79 MG/DL (ref 0.66–1.25)
EOSINOPHIL NFR BLD MANUAL: 0.19 X10(3)/MCL (ref 0–0.9)
EOSINOPHIL NFR BLD MANUAL: 2 % (ref 0–8)
ERYTHROCYTE [DISTWIDTH] IN BLOOD BY AUTOMATED COUNT: 24.8 % (ref 11–14.5)
FIO2 BLOOD GAS (OHS): 50 %
GFR SERPLBLD CREATININE-BSD FMLA CKD-EPI: 83 MLS/MIN/1.73/M2
GLOBULIN SER-MCNC: 3 GM/DL (ref 2–3.9)
GLUCOSE SERPL-MCNC: 130 MG/DL (ref 70–115)
HCO3 BLDA-SCNC: 36.4 MMOL/L
HCT VFR BLD AUTO: 28.7 % (ref 36–48)
HGB BLD-MCNC: 8.4 GM/DL (ref 11.8–16)
IMM GRANULOCYTES # BLD AUTO: 0.06 X10(3)/MCL (ref 0–0.03)
IMM GRANULOCYTES NFR BLD AUTO: 0.6 % (ref 0–0.5)
LYMPHOCYTES NFR BLD MANUAL: 0.38 X10(3)/MCL
LYMPHOCYTES NFR BLD MANUAL: 4 % (ref 13–40)
MCH RBC QN AUTO: 22.3 PG (ref 27–34)
MCV RBC AUTO: 76.3 FL (ref 79–99)
MEAN CELL HEMOGLOBIN CONCENTRATION (OHS) G/DL: 29.3 G/DL (ref 31–37)
MECH RR (OHS): 20 B/MIN
MECH VT (OHS): 450 ML
METHGB MFR BLDA: 0.5 % (ref 0–1.5)
MICROCYTES BLD QL SMEAR: ABNORMAL
MODE (OHS): AC
MONOCYTES NFR BLD MANUAL: 0.38 X10(3)/MCL (ref 0.1–1.3)
MONOCYTES NFR BLD MANUAL: 4 % (ref 2–11)
NEUTROPHILS NFR BLD MANUAL: 89 % (ref 47–80)
NEUTS BAND NFR BLD MANUAL: 1 % (ref 0–11)
NOTIFIED (OHS): ABNORMAL
NOTIFIED BY (OHS): ABNORMAL
NOTIFIED TIME (OHS): 457
NRBC BLD AUTO-RTO: 0 % (ref 0–1)
O2 HB BLOOD GAS (OHS): 98.1 % (ref 94–100)
OXYHGB MFR BLDA: 9.3 G/DL (ref 12–18)
PCO2 BLDA: 51.7 MMHG (ref 35–45)
PEEP (OHS): 5 CMH2O
PH BLDA: 7.46 [PH] (ref 7.38–7.49)
PLATELET # BLD AUTO: 237 X10(3)/MCL (ref 140–371)
PLATELET # BLD EST: ADEQUATE 10*3/UL
PMV BLD AUTO: 10.7 FL (ref 9.4–12.4)
PO2 BLDA: 185 MMHG (ref 80–100)
POCT GLUCOSE: 113 MG/DL (ref 70–110)
POCT GLUCOSE: 119 MG/DL (ref 70–110)
POCT GLUCOSE: 121 MG/DL (ref 70–110)
POCT GLUCOSE: 122 MG/DL (ref 70–110)
POCT GLUCOSE: 123 MG/DL (ref 70–110)
POTASSIUM SERPL-SCNC: 3.4 MMOL/L (ref 3.5–5.1)
PROT SERPL-MCNC: 5.7 GM/DL (ref 6.3–8.2)
RBC # BLD AUTO: 3.76 X10(6)/MCL (ref 4–5.1)
SAO2 % BLDA: 99.2 %
SODIUM SERPL-SCNC: 142 MMOL/L (ref 135–145)
WBC # SPEC AUTO: 9.4 X10(3)/MCL (ref 4–11.5)

## 2023-01-24 PROCEDURE — 99900026 HC AIRWAY MAINTENANCE (STAT)

## 2023-01-24 PROCEDURE — 94761 N-INVAS EAR/PLS OXIMETRY MLT: CPT

## 2023-01-24 PROCEDURE — 25000003 PHARM REV CODE 250: Performed by: FAMILY MEDICINE

## 2023-01-24 PROCEDURE — 25000003 PHARM REV CODE 250

## 2023-01-24 PROCEDURE — 63600175 PHARM REV CODE 636 W HCPCS

## 2023-01-24 PROCEDURE — 80053 COMPREHEN METABOLIC PANEL: CPT | Performed by: FAMILY MEDICINE

## 2023-01-24 PROCEDURE — 94640 AIRWAY INHALATION TREATMENT: CPT

## 2023-01-24 PROCEDURE — 63600175 PHARM REV CODE 636 W HCPCS: Performed by: FAMILY MEDICINE

## 2023-01-24 PROCEDURE — 94003 VENT MGMT INPAT SUBQ DAY: CPT

## 2023-01-24 PROCEDURE — 82803 BLOOD GASES ANY COMBINATION: CPT

## 2023-01-24 PROCEDURE — 20000000 HC ICU ROOM

## 2023-01-24 PROCEDURE — 85027 COMPLETE CBC AUTOMATED: CPT | Performed by: FAMILY MEDICINE

## 2023-01-24 PROCEDURE — 99900035 HC TECH TIME PER 15 MIN (STAT)

## 2023-01-24 PROCEDURE — 36415 COLL VENOUS BLD VENIPUNCTURE: CPT | Performed by: FAMILY MEDICINE

## 2023-01-24 PROCEDURE — 27000221 HC OXYGEN, UP TO 24 HOURS

## 2023-01-24 PROCEDURE — 85025 COMPLETE CBC W/AUTO DIFF WBC: CPT | Performed by: FAMILY MEDICINE

## 2023-01-24 PROCEDURE — 36600 WITHDRAWAL OF ARTERIAL BLOOD: CPT

## 2023-01-24 PROCEDURE — 97530 THERAPEUTIC ACTIVITIES: CPT | Mod: CQ

## 2023-01-24 PROCEDURE — 25000242 PHARM REV CODE 250 ALT 637 W/ HCPCS

## 2023-01-24 RX ORDER — NOREPINEPHRINE BITARTRATE 1 MG/ML
INJECTION, SOLUTION INTRAVENOUS
Status: COMPLETED
Start: 2023-01-24 | End: 2023-01-24

## 2023-01-24 RX ORDER — FUROSEMIDE 20 MG/1
20 TABLET ORAL DAILY
Status: DISCONTINUED | OUTPATIENT
Start: 2023-01-24 | End: 2023-02-02 | Stop reason: HOSPADM

## 2023-01-24 RX ADMIN — OXYCODONE HYDROCHLORIDE AND ACETAMINOPHEN 500 MG: 500 TABLET ORAL at 09:01

## 2023-01-24 RX ADMIN — BISACODYL 10 MG: 5 TABLET, COATED ORAL at 09:01

## 2023-01-24 RX ADMIN — PREGABALIN 50 MG: 25 CAPSULE ORAL at 09:01

## 2023-01-24 RX ADMIN — THEOPHYLLINE 400 MG: 400 TABLET, EXTENDED RELEASE ORAL at 09:01

## 2023-01-24 RX ADMIN — PIPERACILLIN SODIUM AND TAZOBACTAM SODIUM 4.5 G: 4; .5 INJECTION, POWDER, LYOPHILIZED, FOR SOLUTION INTRAVENOUS at 09:01

## 2023-01-24 RX ADMIN — FERROUS SULFATE TAB 325 MG (65 MG ELEMENTAL FE) 1 EACH: 325 (65 FE) TAB at 09:01

## 2023-01-24 RX ADMIN — NOREPINEPHRINE BITARTRATE 8 MG: 1 INJECTION, SOLUTION, CONCENTRATE INTRAVENOUS at 04:01

## 2023-01-24 RX ADMIN — LEVALBUTEROL HYDROCHLORIDE 1.25 MG: 1.25 SOLUTION, CONCENTRATE RESPIRATORY (INHALATION) at 03:01

## 2023-01-24 RX ADMIN — IRON SUCROSE 100 MG: 20 INJECTION, SOLUTION INTRAVENOUS at 10:01

## 2023-01-24 RX ADMIN — LEVALBUTEROL HYDROCHLORIDE 1.25 MG: 1.25 SOLUTION, CONCENTRATE RESPIRATORY (INHALATION) at 11:01

## 2023-01-24 RX ADMIN — LEUCINE, PHENYLALANINE, LYSINE, METHIONINE, ISOLEUCINE, VALINE, HISTIDINE, THREONINE, TRYPTOPHAN, ALANINE, GLYCINE, ARGININE, PROLINE, SERINE, TYROSINE, SODIUM ACETATE, DIBASIC POTASSIUM PHOSPHATE, MAGNESIUM CHLORIDE, SODIUM CHLORIDE, CALCIUM CHLORIDE, DEXTROSE
311; 238; 247; 170; 255; 247; 204; 179; 77; 880; 438; 489; 289; 213; 17; 297; 261; 51; 77; 33; 5 INJECTION INTRAVENOUS at 04:01

## 2023-01-24 RX ADMIN — IPRATROPIUM BROMIDE 0.5 MG: 0.5 SOLUTION RESPIRATORY (INHALATION) at 11:01

## 2023-01-24 RX ADMIN — METFORMIN HYDROCHLORIDE 500 MG: 500 TABLET, FILM COATED ORAL at 05:01

## 2023-01-24 RX ADMIN — FAMOTIDINE 20 MG: 10 INJECTION INTRAVENOUS at 09:01

## 2023-01-24 RX ADMIN — PREGABALIN 50 MG: 25 CAPSULE ORAL at 05:01

## 2023-01-24 RX ADMIN — FERROUS SULFATE TAB 325 MG (65 MG ELEMENTAL FE) 1 EACH: 325 (65 FE) TAB at 04:01

## 2023-01-24 RX ADMIN — IPRATROPIUM BROMIDE 0.5 MG: 0.5 SOLUTION RESPIRATORY (INHALATION) at 07:01

## 2023-01-24 RX ADMIN — BUDESONIDE INHALATION 0.5 MG: 0.5 SUSPENSION RESPIRATORY (INHALATION) at 07:01

## 2023-01-24 RX ADMIN — LEVALBUTEROL HYDROCHLORIDE 1.25 MG: 1.25 SOLUTION, CONCENTRATE RESPIRATORY (INHALATION) at 07:01

## 2023-01-24 RX ADMIN — IPRATROPIUM BROMIDE 0.5 MG: 0.5 SOLUTION RESPIRATORY (INHALATION) at 03:01

## 2023-01-24 RX ADMIN — PIPERACILLIN SODIUM AND TAZOBACTAM SODIUM 4.5 G: 4; .5 INJECTION, POWDER, LYOPHILIZED, FOR SOLUTION INTRAVENOUS at 03:01

## 2023-01-24 RX ADMIN — PROPOFOL 20 MCG/KG/MIN: 10 INJECTION, EMULSION INTRAVENOUS at 09:01

## 2023-01-24 RX ADMIN — FUROSEMIDE 20 MG: 20 TABLET ORAL at 12:01

## 2023-01-24 RX ADMIN — ATORVASTATIN CALCIUM 80 MG: 40 TABLET, FILM COATED ORAL at 09:01

## 2023-01-24 RX ADMIN — PIPERACILLIN SODIUM AND TAZOBACTAM SODIUM 4.5 G: 4; .5 INJECTION, POWDER, LYOPHILIZED, FOR SOLUTION INTRAVENOUS at 12:01

## 2023-01-24 RX ADMIN — METFORMIN HYDROCHLORIDE 500 MG: 500 TABLET, FILM COATED ORAL at 09:01

## 2023-01-24 RX ADMIN — POTASSIUM BICARBONATE 40 MEQ: 391 TABLET, EFFERVESCENT ORAL at 12:01

## 2023-01-24 RX ADMIN — OXYBUTYNIN CHLORIDE 5 MG: 5 TABLET, EXTENDED RELEASE ORAL at 09:01

## 2023-01-24 RX ADMIN — AMIODARONE HYDROCHLORIDE 400 MG: 200 TABLET ORAL at 09:01

## 2023-01-24 RX ADMIN — LEVALBUTEROL HYDROCHLORIDE 1.25 MG: 1.25 SOLUTION, CONCENTRATE RESPIRATORY (INHALATION) at 12:01

## 2023-01-24 NOTE — CONSULTS
[facility]  [facility]  Consult Note    SUMMARY     Recommendations    Recommendation/Intervention: RECOMMEND RESTART TUBE FEEDS. START DIABETISOURCE 1.2 @ 20 ML/HR. ONCE TOLERATING TITRATE SLOWLY TO GOAL RATE OF 55 ML/HR (1518 KCAL, 76 GM PRO, 1032 ML H2O). ONCE PATIENT IS TOLERATED TUBE FEEDS @ 35 ML/HR BEGIN TO WEAN PPN. RECOMMENDATIONS WITH PROPOFOL AT 6.3 ML/HR WILL PROVIDE 99% KCAL NEEDS % PROTEIN NEEDS.  Goals: TOLERATE TUBE FEEDS AT GOAL RATE OF 55 ML/HR.  Nutrition Goal Status: new    Assessment and Plan    No new Assessment & Plan notes have been filed under this hospital service since the last note was generated.  Service: Nutrition       Malnutrition Assessment      NOT APPROPRIATE AT THIS TIME.  Inpatient Nutrition Assessment    Admit Date: 1/13/2023   Total duration of encounter: 11 days     Nutrition Recommendation/Prescription         Communication of Recommendations: reviewed with nurse    Nutrition Assessment     Malnutrition Assessment/Nutrition-Focused Physical Exam        Chart Review    Reason Seen: follow-up    Malnutrition Screening Tool Results                Diagnosis:  ACUTE RESPIRATORY FAILURE W/ HYPOXIA & HYPERCARBIA-INTUBATED, SEVERE SEPSIS W/ SEPTIC SHOCK, A.FIB W/ RVR.    Relevant Medical History: CHF, COPD, DIVERTICULAR DISEASE, DIABETES, GERD, MILD ASTHMA    Nutrition-Related Medications: VITAMIN C, FERROUS SULFATE, PROPOFOL  Calorie Containing IV Medications: Diprivan @ 6.3 ml/hr (provides 166 kcal/d) and Clinimix 4.25/5 @ 40 ML/HR    Nutrition-Related Labs:  CRCL- 57.17, CO2- 40H, GLU- 130H.    Diet/PN Order: Diet NPO  amino acid 4.25% in dextrose 5% w/lytes (CLINIMIX-E) solution with additives (1L provides 42.5 gm AA, 50 gm CHO (170 kcal/L dextrose), Na 35, K 30, Mg 5, Ca 4.5, Acetate 70, Cl 39, Phos 15)  Oral Supplement Order: none  Tube Feeding Order: PREVIOUS ORDER FOR ISOSOURCE 1.5 @ 20 ML/HR BUT WAS D/C (see below for  "calculation)  Appetite/Oral Intake: NPO/NPO  Factors Affecting Nutritional Intake: NPO and on mechanical ventilation  Food/Yazdanism/Cultural Preferences: none reported  Food Allergies: none reported       Wound(s):      Altered Skin Integrity 01/23/23 0530 Right Coccyx #1 Purple or maroon localized area of discolored intact skin or non-intact skin or a blood-filled blister.-Tissue loss description: Not applicable     Comments        Anthropometrics    Height: 5' 4" (162.6 cm)    Last Weight: 51.7 kg (114 lb) (01/24/23 0030) Weight Method: Bed Scale  BMI (Calculated): 19.6  BMI Classification: underweight (BMI less than 22 if >65 years of age)     Ideal Body Weight (IBW), Female: 120 lb     % Ideal Body Weight, Female (lb): 96.82 %                             Usual Weight Provided By: not applicable    Wt Readings from Last 5 Encounters:   01/24/23 51.7 kg (114 lb)   10/22/21 60.1 kg (132 lb 7.9 oz)     Weight Change(s) Since Admission:  Admit Weight: 52.7 kg (116 lb 2.9 oz) (01/21/23 0832)  WT FROM ORIGINAL ASSESSMENT ON CPSI USED FOR NUTRITION RECOMMENDATIONS (1/20/2023: 117#)    Estimated Needs    Weight Used For Calorie Calculations: 51.7 kg (113 lb 15.7 oz)  Energy Calorie Requirements (kcal): 0932-7842 KCAL (30-35 KCAL/KG CBW) PER ORIGINAL ASSESSMENT ON CPSI (CBW- 117#)  Energy Need Method: Kcal/kg  Weight Used For Protein Calculations: 51.7 kg (113 lb 15.7 oz)  Protein Requirements: 55-70 GM PRO (1.0-1.3 GM/KG CBW) PER ORIGINAL ASSESSMENT ON CPSI.  Fluid Requirements (mL): 1400 ML H2O (25 ML/KG CBW)  Temp: 99.2 °F (37.3 °C)       Enteral Nutrition    Patient not receiving enteral nutrition at this time.    Parenteral Nutrition    Standard Formula: Clinimix 4.25/5  Custom Formula: not applicable  Additives: none  Rate/Volume: 40 ML/HR  Lipids: PROPOFOL @ 6.3 ML/HR (166 KCAL)  Total Nutrition Provided by Parenteral Nutrition:                      Evaluation of Received Nutrient Intake    Calories: not meeting " "estimated needs  Protein: not meeting estimated needs    Patient Education    Not applicable.    Nutrition Diagnosis     PES: Inadequate energy intake related to DECREASED ABILITY TO CONSUME SUFFICIENT ENERGY as evidenced by ESTIMATED PARENTERAL/ENTERAL NUTRITION INTAKE INSUFFICIENT TO MEET ESTIMATED NEEDS.. (continues)    Interventions/Goals     Intervention(s): modified composition of enteral nutrition, modified rate of enteral nutrition and modified rate of parenteral nutrition  Goal: Tolerate enteral feeding at goal rate by follow-up. (new)    Monitoring & Evaluation     Dietitian will monitor enteral nutrition intake, parenteral nutrition intake, weight, glucose/endocrine profile and gastrointestinal profile.  Nutrition Risk/Follow-Up: high (follow-up in 1-4 days)   Please consult if re-assessment needed sooner.                                  Reason for Assessment    Reason For Assessment: RD follow-up  Diagnosis: infection/sepsis, diabetes diagnosis/complications (ACUTE RESPIRATORY FAILURE W/ HYPOXIA AND HYPERCARBIA- INTUBATED, SEVERE SEPSIS W/ SEPTIC SHOCK, MICROCYTIC ANEMIA, HEMATURIA, TRANSAMINITIS, A.FIB W/ RVR.)  Relevant Medical History: CHF, COPD, DIVERTICULAR DISEASE, DM, GERD, MILD ASTHMA  Interdisciplinary Rounds: did not attend  General Information Comments: INTUBATED AND SEDATED    Nutrition Risk Screen    Nutrition Risk Screen: large or nonhealing wound, burn or pressure injury, tube feeding or parenteral nutrition    Nutrition/Diet History    Patient Reported Diet/Restrictions/Preferences: no oral intake  Food Preferences: DISLIKES: BEANS AND SALAD  Spiritual, Cultural Beliefs, Episcopal Practices, Values that Affect Care: no  Food Allergies: NKFA  Factors Affecting Nutritional Intake: NPO, on mechanical ventilation    Anthropometrics    Temp: 99.2 °F (37.3 °C)  Height: 5' 4" (162.6 cm)  Height (inches): 64 in  Weight Method: Bed Scale  Weight: 51.7 kg (114 lb)  Weight (lb): 114 lb  Ideal Body " Weight (IBW), Female: 120 lb  % Ideal Body Weight, Female (lb): 96.82 %  BMI (Calculated): 19.6  BMI Grade: other (see comments) (UNDERWEIGHT: BMI <22, AGE >65 YEARS OLD)  Weight Loss: unintentional  Weight Loss Since Admission: 3 lb 4.2 oz       Lab/Procedures/Meds    Pertinent Labs Reviewed: reviewed  Pertinent Labs Comments: CRCL- 57.17, GLU- 130H, CO2- 40H  Pertinent Medications Reviewed: reviewed  Pertinent Medications Comments: VITAMIN C, FERROUS SULFATE, LEVOPHED, PROPOFOL @ 6.3 ML/HR (166 KCAL/DAY).    Physical Findings/Assessment         Estimated/Assessed Needs    Weight Used For Calorie Calculations: 51.7 kg (113 lb 15.7 oz)  Energy Calorie Requirements (kcal): 0279-7788 KCAL (30-35 KCAL/KG CBW) PER ORIGINAL ASSESSMENT ON CPSI (CBW- 117#)  Energy Need Method: Kcal/kg  Protein Requirements: 55-70 GM PRO (1.0-1.3 GM/KG CBW) PER ORIGINAL ASSESSMENT ON CPSI.  Weight Used For Protein Calculations: 51.7 kg (113 lb 15.7 oz)  Fluid Requirements (mL): 1400 ML H2O (25 ML/KG CBW)     RDA Method (mL): 1700         Nutrition Prescription Ordered    Current Diet Order: NPO.  Nutrition Order Comments: CLINIMIX 4.25/5 @ 40 ML/HR (326 KCAL, 41 GM PRO, 960 ML H2O), PROPOFOL @ 6.3 ML/HR (166 KCAL/DAY)  Current Nutrition Support Formula Ordered: Clinimix 4.25/5  Current Nutrition Support Rate Ordered: 40 (ml)    Evaluation of Received Nutrient/Fluid Intake    Parenteral Calories (kcal): 326  Parenteral Protein (gm): 41  Parenteral Fluid (mL): 960  Lipid Calories (kcals): 166 kcals  Total Calories (kcal): 492  % Kcal Needs: 29% KCAL NEEDS MET  % Protein Needs: 74% PROTEIN NEEDS MET  Energy Calories Required: not meeting needs  Protein Required: not meeting needs  Fluid Required: not meeting needs  % Intake of Estimated Energy Needs: 25 - 50 %  % Meal Intake: NPO    Nutrition Risk    Level of Risk/Frequency of Follow-up: moderate - high       Monitor and Evaluation    Food and Nutrient Intake: enteral nutrition intake, energy  intake  Food and Nutrient Adminstration: enteral and parenteral nutrition administration  Anthropometric Measurements: weight change, weight  Biochemical Data, Medical Tests and Procedures: glucose/endocrine profile  Nutrition-Focused Physical Findings: overall appearance       Nutrition Follow-Up     RD TO F/U ON 1/27 AND ADJ MNT PRN.

## 2023-01-24 NOTE — TELEPHONE ENCOUNTER
SALAZAR      ----- Message from Rommel Iqbal sent at 1/24/2023  2:00 PM CST -----  Regarding: call back  [phone] daughter  Pt is currently in the hospital

## 2023-01-24 NOTE — CARE UPDATE
01/24/23 0933   Patient Assessment/Suction   Level of Consciousness (AVPU) responds to voice   Respiratory Effort Normal   Expansion/Accessory Muscles/Retractions no use of accessory muscles   All Lung Fields Breath Sounds coarse   Rhythm/Pattern, Respiratory assisted mechanically   PRE-TX-O2   Device (Oxygen Therapy) ventilator   Oxygen Concentration (%) 50   SpO2 100 %   Pulse Oximetry Type Continuous   Pulse 61   BP (!) 100/52   ETCO2   ETCO2 (mmHg) 43 mmHg        Airway - Non-Surgical   No placement date or time found.   Present Prior to Hospital Arrival?: No  Airway Device Size: 7.5  Style: Cuffed  Cuff Inflated With: Air  Inflation Amount (mL): 10  Depth of Insertion (cm): 21  Secured at: Lips   Secured at 21 cm   Measured At Lips   Secured Location Right   Secured by Commercial tube lerma   Bite Block none   Status Secured;Intact   Airway Safety   Ambu bag with the patient? Yes, Adult Ambu   Is mask with the patient? Yes, Adult Mask   Vent Select   Charged w/in last 24h YES   Preset Conventional Ventilator Settings   Vent Type    Ventilation Type VC   Vent Mode A/C   Humidity HME   Set Rate 20 BPM   Vt Set 450 mL   PEEP/CPAP 5 cmH20   Peak Flow 60 L/min   Plateau Set/Insp. Hold (sec) 0   I-Trigger Type  V-TRIG   Trigger Sensitivity Flow/I-Trigger 1 L/min   Patient Ventilator Parameters   Resp Rate Total 20 br/min   All Fields Breath Sounds coarse   Peak Airway Pressure 29 cmH20   Mean Airway Pressure 11 cmH20   Plateau Pressure 0 cmH20   Exhaled Vt 458 mL   Total Ve 9.4 L/m   I:E Ratio Measured 1:2.70   Auto PEEP 0 cmH20   Conventional Ventilator Alarms   Ve High Alarm 31.5 L/min   Ve Low Alarm 2.5 L/min   Vt High Alarm 1000 mL   Vt Low Alarm 215 mL   Resp Rate High Alarm 40 br/min   Resp Rate Low Alarm 20   Press High Alarm 60 cmH2O   Press Low Alarm 20 cmH2O   Apnea Rate 10   Apnea Volume (mL) 450 mL   Apnea Oxygen Concentration  100   Apnea Flow Rate (L/min) 60   T Apnea 20 sec(s)   Ready to  Wean/Extubation Screen   FIO2<=50 (chart decimal) 0.5   MV<16L (chart vol.) 9.4   PEEP <=8 (chart #) 5   Negative Inspiratory Force   Negative Inspiratory Force (cm H2O) 0   Vital Capacity   Vital Capacity (mL) 0     FIO2 DECREASED TO 40%. SPO2 100%.

## 2023-01-24 NOTE — PLAN OF CARE
Problem: Adult Inpatient Plan of Care  Goal: Plan of Care Review  Outcome: Ongoing, Progressing  Goal: Patient-Specific Goal (Individualized)  Outcome: Ongoing, Progressing  Goal: Absence of Hospital-Acquired Illness or Injury  Outcome: Ongoing, Progressing  Goal: Optimal Comfort and Wellbeing  Outcome: Ongoing, Progressing  Goal: Readiness for Transition of Care  Outcome: Ongoing, Progressing     Problem: Infection  Goal: Absence of Infection Signs and Symptoms  Outcome: Ongoing, Progressing     Problem: Communication Impairment (Mechanical Ventilation, Invasive)  Goal: Effective Communication  Outcome: Ongoing, Progressing     Problem: Device-Related Complication Risk (Mechanical Ventilation, Invasive)  Goal: Optimal Device Function  Outcome: Ongoing, Progressing     Problem: Nutrition Impairment (Mechanical Ventilation, Invasive)  Goal: Optimal Nutrition Delivery  Outcome: Ongoing, Progressing     Problem: Skin and Tissue Injury (Mechanical Ventilation, Invasive)  Goal: Absence of Device-Related Skin and Tissue Injury  Outcome: Ongoing, Progressing     Problem: Ventilator-Induced Lung Injury (Mechanical Ventilation, Invasive)  Goal: Absence of Ventilator-Induced Lung Injury  Outcome: Ongoing, Progressing     Problem: Fall Injury Risk  Goal: Absence of Fall and Fall-Related Injury  Outcome: Ongoing, Progressing     Problem: Skin Injury Risk Increased  Goal: Skin Health and Integrity  Outcome: Ongoing, Progressing     Problem: Adjustment to Illness (Sepsis/Septic Shock)  Goal: Optimal Coping  Outcome: Ongoing, Progressing     Problem: Bleeding (Sepsis/Septic Shock)  Goal: Absence of Bleeding  Outcome: Ongoing, Progressing     Problem: Glycemic Control Impaired (Sepsis/Septic Shock)  Goal: Blood Glucose Level Within Desired Range  Outcome: Ongoing, Progressing     Problem: Infection Progression (Sepsis/Septic Shock)  Goal: Absence of Infection Signs and Symptoms  Outcome: Ongoing, Progressing     Problem:  Nutrition Impaired (Sepsis/Septic Shock)  Goal: Optimal Nutrition Intake  Outcome: Ongoing, Progressing     Problem: Impaired Wound Healing  Goal: Optimal Wound Healing  Outcome: Ongoing, Progressing

## 2023-01-24 NOTE — PROGRESS NOTES
[facility] Medicine  Progress Note    Patient Name: Michel De La Paz  MRN: [id number]  Patient Class: IP- Inpatient   Admission Date: 1/13/2023  Length of Stay: 11 days  Attending Physician: Ac Childers MD  Primary Care Provider: WESLY Best        Subjective:     Principal Problem:Acute respiratory failure with hypoxia and hypercarbia         HPI:   68 yo F admitted overnight with worsening weakenss, sob and tachycardia. H/  H low initially and got 2 U pRBC overnight. H/H stable this and trend up.  EKG atrial fibrillation with rvr better.            Overview/Hospital Course:   68 yo F admitted overnight with worsening weakenss, sob and tachycardia. H/  H low initially and got 2 U pRBC overnight. H/H stable this and trend up.  EKG atrial fibrillation with rvr better.    01/15   HR better controlled overnight on amiodarone increased TID    01/16 c/o constipation overngiht and this AM  iron low on panel  HR stable overnight  01/17/2023 pt c/o no bm x 2 weeks.  H&H has been stable  01/18/2023 denies pain, no BM yet, H&H stable, very weak and unsteady gait, working with PT.  01/19/2023 pt had large BM today, still very weak, got SOB overnight placed on 7L oximizer due to drop in oxygen sats.  Echo EF 55-60% and CXR shows pulmonary edema.  Pt c/o feeling weak.  Surgery plans for EGD in am.  pt has had recent weight loss about 4 kng since April, was 58 kg now 54, BMI down to 20.  01/20/2023 pt more obtunded overnight this am with increased weakness and not following commands, stat ABG done showed PCO2 elevated and acute respiratory acidosis, transferred pt to ICU and placed on BIPAP, 1 hr ABG very minimal improvement and pt GSC of 6 low with minimally responsive withdraws to pain, no verbal responses  01/21/2023 pt stable on vent overnight, abg this am looks good, will start to wean, CXR no infiltrates, blood and urine cx repeated and pending.     01/22/2023 patient remained  stable on the vent were able to wean her rate a bit however her pCO2 did go up to the 80s ABG is still reasonable at 7.3  but with pCO2 very high in the 80  we put him back on AC with a rate of 16  01/23/2023 patient stable overnight her ABG actually looks better this a.m. her pCO2 has normalized after we switched her back to AC with a rate is 16 she is tolerating currently her FiO2 is at 50% respiratory rate is 20 she is still on the Levophed at 13.5 mics and getting Clinimix at 40 and propofol at 20  01/24/2023 tolerating ventilator wean overnight FiO2 50% down      No new subjective & objective note has been filed under this hospital service since the last note was generated.    Physical Exam  Vitals and nursing note reviewed.   Constitutional:       Comments: Sedated on ventilator     HENT:      Head: Normocephalic and atraumatic.   Cardiovascular:      Rate and Rhythm: Normal rate.      Heart sounds: Normal heart sounds.   Pulmonary:      Effort: Pulmonary effort is normal.      Breath sounds: Normal breath sounds.   Abdominal:      General: Abdomen is flat.          Review of Systems   Constitutional:  Negative for chills, fever and weight loss.   HENT:  Negative for congestion, ear pain and sore throat.    Eyes:  Negative for blurred vision, pain and redness.   Respiratory:  Negative for cough, hemoptysis, sputum production, shortness of breath and wheezing.    Cardiovascular:  Negative for chest pain, palpitations, leg swelling and PND.   Gastrointestinal:  Negative for blood in stool, constipation, diarrhea, nausea and vomiting.   Genitourinary:  Negative for dysuria, frequency, hematuria and urgency.   Musculoskeletal:  Negative for back pain, falls, joint pain and neck pain.   Skin:  Negative for itching and rash.   Neurological:  Negative for dizziness, tremors, focal weakness, seizures, loss of consciousness, weakness and headaches.   Endo/Heme/Allergies:  Negative for environmental allergies. Does not  bruise/bleed easily.   Psychiatric/Behavioral:  Negative for depression, hallucinations and suicidal ideas. The patient is not nervous/anxious.     Assessment/Plan:      * Acute respiratory failure with hypoxia and hypercarbia  Patient with Hypercapnic and Hypoxic Respiratory failure which is Acute on chronic.  she is not on home oxygen. Supplemental oxygen was provided and noted- Vent Mode: A/C  Oxygen Concentration (%):  [50] 50  Resp Rate Total:  [12 br/min-21 br/min] 20 br/min  Vt Set:  [450 mL] 450 mL  PEEP/CPAP:  [5 cmH20] 5 cmH20  Pressure Support:  [10 cmH20] 10 cmH20  Mean Airway Pressure:  [9.2 qzW92-20 cmH20] 12 cmH20.   Signs/symptoms of respiratory failure include- tachypnea, increased work of breathing and respiratory distress. Contributing diagnoses includes - CHF and COPD Labs and images were reviewed. Patient Has recent ABG, which has been reviewed. Will treat underlying causes and adjust management of respiratory failure as follows-     She will remain on the AC, get our case management consult to attempt for LTAC placement continue all other meds and treatment including her leave albuterol nebs IV steroids IV antibiotics follow up on cultures as they report    Recent weight loss  Nutrition consulted. Most recent weight and BMI monitored-  Patient will have EGD and colonoscopy once stabilized from a respiratory standpoint                        Measurements:  Wt Readings from Last 1 Encounters:   01/23/23 51.1 kg (112 lb 11.2 oz)   Body mass index is 19.34 kg/m².    Recommendations:      Patient has been screened and assessed by RD. RD will follow patient.      Transaminitis   Improved will recheck labs CMP in a.m.      Hematuria   Recently been worked up by Urology has some small stones family reports other workup was negative including what sounds like a endoscopy or bladder scope      Microcytic anemia   Patient was scheduled for EGD and had a transfusion at the time of admission here however  EGD was postponed due to acute respiratory failure with hypercarbia requiring intubation she will follow up with Dr. Abel once he is more stable  H&H remains stable will continue to monitor      Atrial fibrillation with RVR  More p.o. and recheck levels in a.m.Patient with Paroxysmal (<7 days) atrial fibrillation which is controlled currently with Amiodarone. Patient is currently in sinus rhythm.[id number] Score: 3. HASBLED Score:  . Anticoagulation not indicated due to anemia.    Her heart rate has been stable currently she is 62 and sinus    Severe sepsis with septic shock  This patient does have evidence of infective focus  My overall impression is septic shock. Vital signs were reviewed and noted in progress note.  Antibiotics given-   Antibiotics (From admission, onward)      Start     Stop Route Frequency Ordered    01/22/23 0400  piperacillin-tazobactam (ZOSYN) 4.5 g in dextrose 5 % in water (D5W) 5 % 100 mL IVPB (MB+)         -- IV Every 8 hours (non-standard times) 01/21/23 1521          Cultures were taken-   Microbiology Results (last 7 days)       ** No results found for the last 168 hours. **          Latest lactate reviewed, they are-  No results for input(s): LACTATE in the last 72 hours.    Organ dysfunction indicated by Acute kidney injury, Encephalopathy  and Acute respiratory failure  Source-      Source control Achieved by- iv abx and This patient does have evidence of infective focus  My overall impression is septic shock.  Source:  Sputum culture from intubation is pending shows 3 organisms growing rare amounts will follow up  Antibiotics given-   Antibiotics (From admission, onward)      Start     Stop Route Frequency Ordered    01/22/23 0400  piperacillin-tazobactam (ZOSYN) 4.5 g in dextrose 5 % in water (D5W) 5 % 100 mL IVPB (MB+)         -- IV Every 8 hours (non-standard times) 01/21/23 1521          Latest lactate reviewed-  No results for input(s): LACTATE in the last 72  hours.  Organ dysfunction indicated by Acute kidney injury and Acute respiratory failure    Shock with decreased perfusion noted, Fluid challenge  was given at 30cc/kg and was not given at 30cc/kg due to      Post- resuscitation assessment- (Done after fluids given for shock)  Vital signs post fluid administrations were-  Temp Readings from Last 1 Encounters:   01/23/23 99.4 °F (37.4 °C)     BP Readings from Last 1 Encounters:   01/23/23 132/72     Pulse Readings from Last 1 Encounters:   01/23/23 68       Perfusion exam was performed within 6 hours of septic shock presentation after bolus shows Adequate tissue perfusion assessed by non-invasive monitoring  Will Start Pressors- Levophed for MAP of 65  Source control achieved by:  Iv abx   echo done showed EF of 60-65% with normal right  Ventricular function   CT angio chest done on admission did not show any PE and no acute finding     CT of the abdomen done on admit showed some small renal calculi no other acute finding    Patient remains on the Levophed at 13.5 mils per hour which is a 0.12 milligram/kilogram per minute dose we will continue and wean as tolerated        Hypokalemia    Her potassium is improved we will continue the p.o. replacement for now recheck labs in a.m.    VTE Risk Mitigation (From admission, onward)           Ordered     Place sequential compression device  Until discontinued         01/21/23 1326                    Discharge Planning   LASHAWN:      Code Status: Full Code   Is the patient medically ready for discharge?:     Reason for patient still in hospital (select all that apply): Patient unstable, Patient trending condition, Laboratory test, Treatment and Imaging                     Ac Childers MD  Department of Hospital Medicine   [facility]

## 2023-01-24 NOTE — PT/OT/SLP PROGRESS
Physical Therapy Treatment    Patient Name:  Michel De La Paz   MRN:  [id number]    Recommendations:     Discharge Recommendations: nursing facility, skilled, rehabilitation facility, LTACH (long-term acute care hospital)  Discharge Equipment Recommendations:    Barriers to discharge: None    Assessment:     Michel De La Paz is a 67 y.o. female admitted with a medical diagnosis of Acute respiratory failure with hypoxia and hypercarbia.  She presents with the following impairments/functional limitations: weakness, impaired endurance, impaired functional mobility, impaired balance .    Rehab Prognosis: Poor; patient would benefit from acute skilled PT services to address these deficits and reach maximum level of function.    Recent Surgery: * No surgery found *      Plan:     During this hospitalization, patient to be seen daily to address the identified rehab impairments via therapeutic activities and progress toward the following goals:    Plan of Care Expires:       Subjective     Chief Complaint: decreased le strength with bed mob and tf.  Patient/Family Comments/goals: improve strength and balance with tf and gait.  Pain/Comfort:  Pain Rating 1: 0/10      Objective:     Communicated with nursing prior to session.  Patient found left sidelying with bed alarm, blood pressure cuff, waterman catheter, oxygen, ventilator upon PT entry to room.     General Precautions: Standard,    Orthopedic Precautions:    Braces:    Respiratory Status: Nasal cannula, flow 2 L/min     Functional Mobility:        AM-PAC 6 CLICK MOBILITY  Turning over in bed (including adjusting bedclothes, sheets and blankets)?: 2  Sitting down on and standing up from a chair with arms (e.g., wheelchair, bedside commode, etc.): 2  Moving from lying on back to sitting on the side of the bed?: 2  Moving to and from a bed to a chair (including a wheelchair)?: 2  Need to walk in hospital room?: 2  Climbing 3-5 steps with a railing?: 2  Basic Mobility Total  Score: 12       Treatment & Education:  Pt performed bilateral UE/LE AAROM to decrease risk of contractures.    Patient left left sidelying with all lines intact and bed alarm on..    GOALS:   Multidisciplinary Problems       Physical Therapy Goals          Problem: Physical Therapy    Goal Priority Disciplines Outcome Goal Variances Interventions   Physical Therapy Goal     PT, PT/OT Ongoing, Progressing     Description: Goals to be met by: discharge     Patient will increase functional independence with mobility by performin. Supine to sit with MInimal Assistance                         Time Tracking:     PT Received On: 23  PT Start Time: 615     PT Stop Time: 630  PT Total Time (min): 15 min     Billable Minutes: Therapeutic Activity 15    Treatment Type: Treatment  PT/PTA: PTA     PTA Visit Number: 2     2023

## 2023-01-25 LAB
ANION GAP SERPL CALC-SCNC: 3 MEQ/L (ref 2–13)
ANISOCYTOSIS BLD QL SMEAR: ABNORMAL
BASE EXCESS BLD CALC-SCNC: 12.6 MMOL/L
BASOPHILS # BLD AUTO: 0.01 X10(3)/MCL (ref 0.01–0.08)
BASOPHILS NFR BLD AUTO: 0.1 % (ref 0.1–1.2)
BLOOD GAS SAMPLE TYPE (OHS): ABNORMAL
BUN SERPL-MCNC: 20 MG/DL (ref 7–20)
CALCIUM SERPL-MCNC: 8.1 MG/DL (ref 8.4–10.2)
CHLORIDE SERPL-SCNC: 98 MMOL/L (ref 98–110)
CO2 SERPL-SCNC: 40 MMOL/L (ref 21–32)
COHGB MFR BLDA: 0.6 % (ref 0–1.5)
CREAT SERPL-MCNC: 0.73 MG/DL (ref 0.66–1.25)
CREAT/UREA NIT SERPL: 27 (ref 12–20)
EOSINOPHIL # BLD AUTO: 0.27 X10(3)/MCL (ref 0.04–0.36)
EOSINOPHIL NFR BLD AUTO: 3.7 % (ref 0.7–7)
ERYTHROCYTE [DISTWIDTH] IN BLOOD BY AUTOMATED COUNT: 24.9 % (ref 11–14.5)
FIO2 BLOOD GAS (OHS): 40 %
GFR SERPLBLD CREATININE-BSD FMLA CKD-EPI: >90 MLS/MIN/1.73/M2
GLUCOSE SERPL-MCNC: 132 MG/DL (ref 70–115)
HCO3 BLDA-SCNC: 36.1 MMOL/L
HCT VFR BLD AUTO: 30.4 % (ref 36–48)
HGB BLD-MCNC: 8.5 GM/DL (ref 11.8–16)
HYPOCHROMIA BLD QL SMEAR: ABNORMAL
IMM GRANULOCYTES # BLD AUTO: 0.02 X10(3)/MCL (ref 0–0.03)
IMM GRANULOCYTES NFR BLD AUTO: 0.3 % (ref 0–0.5)
LYMPHOCYTES # BLD AUTO: 0.6 X10(3)/MCL (ref 1.16–3.74)
LYMPHOCYTES NFR BLD AUTO: 8.3 % (ref 20–55)
MAGNESIUM SERPL-MCNC: 2.3 MG/DL (ref 1.8–2.4)
MCH RBC QN AUTO: 21.7 PG (ref 27–34)
MCV RBC AUTO: 77.7 FL (ref 79–99)
MEAN CELL HEMOGLOBIN CONCENTRATION (OHS) G/DL: 28 G/DL (ref 31–37)
MECH RR (OHS): 20 B/MIN
MECH VT (OHS): 450 ML
METHGB MFR BLDA: 0.5 % (ref 0–1.5)
MODE (OHS): AC
MONOCYTES # BLD AUTO: 0.32 X10(3)/MCL (ref 0.24–0.36)
MONOCYTES NFR BLD AUTO: 4.4 % (ref 4.7–12.5)
NEUTROPHILS # BLD AUTO: 6.05 X10(3)/MCL (ref 1.56–6.13)
NEUTROPHILS NFR BLD AUTO: 83.2 % (ref 37–73)
NOTIFIED (OHS): ABNORMAL
NOTIFIED BY (OHS): ABNORMAL
NOTIFIED TIME (OHS): 430
NRBC BLD AUTO-RTO: 0 % (ref 0–1)
O2 HB BLOOD GAS (OHS): 97.5 % (ref 94–100)
OXYHGB MFR BLDA: 10 G/DL (ref 12–18)
PCO2 BLDA: 41.6 MMHG (ref 35–45)
PEEP (OHS): 5 CMH2O
PH BLDA: 7.56 [PH] (ref 7.38–7.49)
PLATELET # BLD AUTO: 267 X10(3)/MCL (ref 140–371)
PLATELET # BLD EST: ADEQUATE 10*3/UL
PMV BLD AUTO: 10.5 FL (ref 9.4–12.4)
PO2 BLDA: 119.3 MMHG (ref 80–100)
POCT GLUCOSE: 108 MG/DL (ref 70–110)
POCT GLUCOSE: 115 MG/DL (ref 70–110)
POTASSIUM SERPL-SCNC: 3.4 MMOL/L (ref 3.5–5.1)
RBC # BLD AUTO: 3.91 X10(6)/MCL (ref 4–5.1)
SAO2 % BLDA: 98.6 %
SODIUM SERPL-SCNC: 141 MMOL/L (ref 135–145)
WBC # SPEC AUTO: 7.3 X10(3)/MCL (ref 4–11.5)

## 2023-01-25 PROCEDURE — 25000003 PHARM REV CODE 250

## 2023-01-25 PROCEDURE — 85025 COMPLETE CBC W/AUTO DIFF WBC: CPT | Performed by: FAMILY MEDICINE

## 2023-01-25 PROCEDURE — 63600175 PHARM REV CODE 636 W HCPCS: Performed by: FAMILY MEDICINE

## 2023-01-25 PROCEDURE — 27000221 HC OXYGEN, UP TO 24 HOURS

## 2023-01-25 PROCEDURE — 99900035 HC TECH TIME PER 15 MIN (STAT)

## 2023-01-25 PROCEDURE — 83735 ASSAY OF MAGNESIUM: CPT | Performed by: FAMILY MEDICINE

## 2023-01-25 PROCEDURE — 94640 AIRWAY INHALATION TREATMENT: CPT

## 2023-01-25 PROCEDURE — 99900026 HC AIRWAY MAINTENANCE (STAT)

## 2023-01-25 PROCEDURE — 20000000 HC ICU ROOM

## 2023-01-25 PROCEDURE — 82803 BLOOD GASES ANY COMBINATION: CPT

## 2023-01-25 PROCEDURE — 25000003 PHARM REV CODE 250: Performed by: FAMILY MEDICINE

## 2023-01-25 PROCEDURE — 97530 THERAPEUTIC ACTIVITIES: CPT | Mod: CQ

## 2023-01-25 PROCEDURE — 94003 VENT MGMT INPAT SUBQ DAY: CPT

## 2023-01-25 PROCEDURE — 25000242 PHARM REV CODE 250 ALT 637 W/ HCPCS

## 2023-01-25 PROCEDURE — 36415 COLL VENOUS BLD VENIPUNCTURE: CPT | Performed by: FAMILY MEDICINE

## 2023-01-25 PROCEDURE — 63600175 PHARM REV CODE 636 W HCPCS

## 2023-01-25 PROCEDURE — 94761 N-INVAS EAR/PLS OXIMETRY MLT: CPT

## 2023-01-25 PROCEDURE — 80048 BASIC METABOLIC PNL TOTAL CA: CPT | Performed by: FAMILY MEDICINE

## 2023-01-25 PROCEDURE — 36600 WITHDRAWAL OF ARTERIAL BLOOD: CPT

## 2023-01-25 RX ADMIN — LEVALBUTEROL HYDROCHLORIDE 1.25 MG: 1.25 SOLUTION, CONCENTRATE RESPIRATORY (INHALATION) at 08:01

## 2023-01-25 RX ADMIN — OXYCODONE HYDROCHLORIDE AND ACETAMINOPHEN 500 MG: 500 TABLET ORAL at 09:01

## 2023-01-25 RX ADMIN — IPRATROPIUM BROMIDE 0.5 MG: 0.5 SOLUTION RESPIRATORY (INHALATION) at 11:01

## 2023-01-25 RX ADMIN — METFORMIN HYDROCHLORIDE 500 MG: 500 TABLET, FILM COATED ORAL at 07:01

## 2023-01-25 RX ADMIN — LEVALBUTEROL HYDROCHLORIDE 1.25 MG: 1.25 SOLUTION, CONCENTRATE RESPIRATORY (INHALATION) at 11:01

## 2023-01-25 RX ADMIN — FERROUS SULFATE TAB 325 MG (65 MG ELEMENTAL FE) 1 EACH: 325 (65 FE) TAB at 08:01

## 2023-01-25 RX ADMIN — FLUTICASONE PROPIONATE 50 MCG: 50 SPRAY, METERED NASAL at 08:01

## 2023-01-25 RX ADMIN — ACETAMINOPHEN 650 MG: 325 TABLET, FILM COATED ORAL at 11:01

## 2023-01-25 RX ADMIN — FERROUS SULFATE TAB 325 MG (65 MG ELEMENTAL FE) 1 EACH: 325 (65 FE) TAB at 03:01

## 2023-01-25 RX ADMIN — FERROUS SULFATE TAB 325 MG (65 MG ELEMENTAL FE) 1 EACH: 325 (65 FE) TAB at 09:01

## 2023-01-25 RX ADMIN — POLYETHYLENE GLYCOL 3350 17 G: 17 POWDER, FOR SOLUTION ORAL at 09:01

## 2023-01-25 RX ADMIN — BUDESONIDE INHALATION 0.5 MG: 0.5 SUSPENSION RESPIRATORY (INHALATION) at 08:01

## 2023-01-25 RX ADMIN — IRON SUCROSE 100 MG: 20 INJECTION, SOLUTION INTRAVENOUS at 09:01

## 2023-01-25 RX ADMIN — LEUCINE, PHENYLALANINE, LYSINE, METHIONINE, ISOLEUCINE, VALINE, HISTIDINE, THREONINE, TRYPTOPHAN, ALANINE, GLYCINE, ARGININE, PROLINE, SERINE, TYROSINE, SODIUM ACETATE, DIBASIC POTASSIUM PHOSPHATE, MAGNESIUM CHLORIDE, SODIUM CHLORIDE, CALCIUM CHLORIDE, DEXTROSE
311; 238; 247; 170; 255; 247; 204; 179; 77; 880; 438; 489; 289; 213; 17; 297; 261; 51; 77; 33; 5 INJECTION INTRAVENOUS at 03:01

## 2023-01-25 RX ADMIN — FAMOTIDINE 20 MG: 10 INJECTION INTRAVENOUS at 09:01

## 2023-01-25 RX ADMIN — PIPERACILLIN SODIUM AND TAZOBACTAM SODIUM 4.5 G: 4; .5 INJECTION, POWDER, LYOPHILIZED, FOR SOLUTION INTRAVENOUS at 04:01

## 2023-01-25 RX ADMIN — NOREPINEPHRINE BITARTRATE 0.06 MCG/KG/MIN: 8 INJECTION, SOLUTION INTRAVENOUS at 03:01

## 2023-01-25 RX ADMIN — METFORMIN HYDROCHLORIDE 500 MG: 500 TABLET, FILM COATED ORAL at 09:01

## 2023-01-25 RX ADMIN — FAMOTIDINE 20 MG: 10 INJECTION INTRAVENOUS at 08:01

## 2023-01-25 RX ADMIN — LEVALBUTEROL HYDROCHLORIDE 1.25 MG: 1.25 SOLUTION, CONCENTRATE RESPIRATORY (INHALATION) at 04:01

## 2023-01-25 RX ADMIN — PIPERACILLIN SODIUM AND TAZOBACTAM SODIUM 4.5 G: 4; .5 INJECTION, POWDER, LYOPHILIZED, FOR SOLUTION INTRAVENOUS at 12:01

## 2023-01-25 RX ADMIN — PROPOFOL 20 MCG/KG/MIN: 10 INJECTION, EMULSION INTRAVENOUS at 06:01

## 2023-01-25 RX ADMIN — PREGABALIN 50 MG: 25 CAPSULE ORAL at 04:01

## 2023-01-25 RX ADMIN — FUROSEMIDE 20 MG: 20 TABLET ORAL at 09:01

## 2023-01-25 RX ADMIN — ATORVASTATIN CALCIUM 80 MG: 40 TABLET, FILM COATED ORAL at 09:01

## 2023-01-25 RX ADMIN — OXYBUTYNIN CHLORIDE 5 MG: 5 TABLET, EXTENDED RELEASE ORAL at 09:01

## 2023-01-25 RX ADMIN — OXYCODONE HYDROCHLORIDE AND ACETAMINOPHEN 500 MG: 500 TABLET ORAL at 08:01

## 2023-01-25 RX ADMIN — THEOPHYLLINE 400 MG: 400 TABLET, EXTENDED RELEASE ORAL at 09:01

## 2023-01-25 RX ADMIN — PREGABALIN 50 MG: 25 CAPSULE ORAL at 09:01

## 2023-01-25 RX ADMIN — PREGABALIN 50 MG: 25 CAPSULE ORAL at 08:01

## 2023-01-25 RX ADMIN — PIPERACILLIN SODIUM AND TAZOBACTAM SODIUM 4.5 G: 4; .5 INJECTION, POWDER, LYOPHILIZED, FOR SOLUTION INTRAVENOUS at 07:01

## 2023-01-25 RX ADMIN — IPRATROPIUM BROMIDE 0.5 MG: 0.5 SOLUTION RESPIRATORY (INHALATION) at 08:01

## 2023-01-25 RX ADMIN — LEVALBUTEROL HYDROCHLORIDE 1.25 MG: 1.25 SOLUTION, CONCENTRATE RESPIRATORY (INHALATION) at 12:01

## 2023-01-25 RX ADMIN — IPRATROPIUM BROMIDE 0.5 MG: 0.5 SOLUTION RESPIRATORY (INHALATION) at 04:01

## 2023-01-25 RX ADMIN — FLUTICASONE PROPIONATE 50 MCG: 50 SPRAY, METERED NASAL at 09:01

## 2023-01-25 RX ADMIN — IPRATROPIUM BROMIDE 0.5 MG: 0.5 SOLUTION RESPIRATORY (INHALATION) at 03:01

## 2023-01-25 RX ADMIN — PROPOFOL 20 MCG/KG/MIN: 10 INJECTION, EMULSION INTRAVENOUS at 11:01

## 2023-01-25 RX ADMIN — IPRATROPIUM BROMIDE 0.5 MG: 0.5 SOLUTION RESPIRATORY (INHALATION) at 12:01

## 2023-01-25 RX ADMIN — LEVALBUTEROL HYDROCHLORIDE 1.25 MG: 1.25 SOLUTION, CONCENTRATE RESPIRATORY (INHALATION) at 03:01

## 2023-01-25 NOTE — ASSESSMENT & PLAN NOTE
Patient with Hypercapnic and Hypoxic Respiratory failure which is Acute on chronic.  she is not on home oxygen. Supplemental oxygen was provided and noted- Vent Mode: SIMV  Oxygen Concentration (%):  [35-40] 35  Resp Rate Total:  [18 br/min-31 br/min] 23 br/min  Vt Set:  [450 mL] 450 mL  PEEP/CPAP:  [5 cmH20] 5 cmH20  Pressure Support:  [10 cmH20] 10 cmH20  Mean Airway Pressure:  [9.3 xdB27-43 cmH20] 10 cmH20.   Signs/symptoms of respiratory failure include- tachypnea, increased work of breathing and respiratory distress. Contributing diagnoses includes - CHF and COPD Labs and images were reviewed. Patient Has recent ABG, which has been reviewed. Will treat underlying causes and adjust management of respiratory failure as follows-     She will remain on the AC, get our case management consult to attempt for LTAC placement continue all other meds and treatment including her leave albuterol nebs IV steroids IV antibiotics follow up on cultures as they report    Will continue to try and wean ventilator overnight  SIMV

## 2023-01-25 NOTE — PT/OT/SLP PROGRESS
Physical Therapy Treatment    Patient Name:  Michel De La Paz   MRN:  [id number]    Recommendations:     Discharge Recommendations: nursing facility, skilled, rehabilitation facility, LTACH (long-term acute care hospital)  Discharge Equipment Recommendations:    Barriers to discharge: None    Assessment:     Michel De La Paz is a 67 y.o. female admitted with a medical diagnosis of Acute respiratory failure with hypoxia and hypercarbia.  She presents with the following impairments/functional limitations: weakness, impaired endurance, impaired functional mobility, impaired balance .    Rehab Prognosis: fair; patient would benefit from acute skilled PT services to address these deficits and reach maximum level of function.    Recent Surgery: * No surgery found *      Plan:     During this hospitalization, patient to be seen daily to address the identified rehab impairments via therapeutic activities and progress toward the following goals:    Plan of Care Expires:       Subjective     Chief Complaint: overall weakness with bed mob and standing  Patient/Family Comments/goals: improve strength and endurance.  Pain/Comfort:  Pain Rating 1: 0/10      Objective:     Communicated with nsg prior to session.  Patient found left sidelying with bed alarm, blood pressure cuff, waterman catheter, oxygen, ventilator upon PT entry to room.     General Precautions: Standard,    Orthopedic Precautions:    Braces:    Respiratory Status: Nasal cannula, flow 2 L/min     Functional Mobility:        AM-PAC 6 CLICK MOBILITY  Turning over in bed (including adjusting bedclothes, sheets and blankets)?: 1  Sitting down on and standing up from a chair with arms (e.g., wheelchair, bedside commode, etc.): 1  Moving from lying on back to sitting on the side of the bed?: 1  Moving to and from a bed to a chair (including a wheelchair)?: 1  Need to walk in hospital room?: 1  Climbing 3-5 steps with a railing?: 1  Basic Mobility Total Score:  6       Treatment & Education:  Pt performed bilateral UE/LE AAROM to improve mobility and decrease risk of contractures.    Patient left right sidelying with all lines intact..    GOALS:   Multidisciplinary Problems       Physical Therapy Goals          Problem: Physical Therapy    Goal Priority Disciplines Outcome Goal Variances Interventions   Physical Therapy Goal     PT, PT/OT Ongoing, Progressing     Description: Goals to be met by: discharge     Patient will increase functional independence with mobility by performin. Supine to sit with MInimal Assistance                         Time Tracking:     PT Received On: 23  PT Start Time: 0700     PT Stop Time: 0715  PT Total Time (min): 15 min     Billable Minutes: Therapeutic Activity 15    Treatment Type: Treatment  PT/PTA: PTA     PTA Visit Number: 3     2023

## 2023-01-25 NOTE — PROGRESS NOTES
[facility] Medicine  Progress Note    Patient Name: Michel De La Paz  MRN: [id number]  Patient Class: IP- Inpatient   Admission Date: 1/13/2023  Length of Stay: 12 days  Attending Physician: Ac Childers MD  Primary Care Provider: WESLY Best        Subjective:     Principal Problem:Acute respiratory failure with hypoxia and hypercarbia        HPI:   68 yo F admitted overnight with worsening weakenss, sob and tachycardia. H/  H low initially and got 2 U pRBC overnight. H/H stable this and trend up.  EKG atrial fibrillation with rvr better.            Overview/Hospital Course:   68 yo F admitted overnight with worsening weakenss, sob and tachycardia. H/  H low initially and got 2 U pRBC overnight. H/H stable this and trend up.  EKG atrial fibrillation with rvr better.    01/15   HR better controlled overnight on amiodarone increased TID    01/16 c/o constipation overngiht and this AM  iron low on panel  HR stable overnight  01/17/2023 pt c/o no bm x 2 weeks.  H&H has been stable  01/18/2023 denies pain, no BM yet, H&H stable, very weak and unsteady gait, working with PT.  01/19/2023 pt had large BM today, still very weak, got SOB overnight placed on 7L oximizer due to drop in oxygen sats.  Echo EF 55-60% and CXR shows pulmonary edema.  Pt c/o feeling weak.  Surgery plans for EGD in am.  pt has had recent weight loss about 4 kng since April, was 58 kg now 54, BMI down to 20.  01/20/2023 pt more obtunded overnight this am with increased weakness and not following commands, stat ABG done showed PCO2 elevated and acute respiratory acidosis, transferred pt to ICU and placed on BIPAP, 1 hr ABG very minimal improvement and pt GSC of 6 low with minimally responsive withdraws to pain, no verbal responses  01/21/2023 pt stable on vent overnight, abg this am looks good, will start to wean, CXR no infiltrates, blood and urine cx repeated and pending.     01/22/2023 patient remained  stable on the vent were able to wean her rate a bit however her pCO2 did go up to the 80s ABG is still reasonable at 7.3  but with pCO2 very high in the 80  we put him back on AC with a rate of 16  01/23/2023 patient stable overnight her ABG actually looks better this a.m. her pCO2 has normalized after we switched her back to AC with a rate is 16 she is tolerating currently her FiO2 is at 50% respiratory rate is 20 she is still on the Levophed at 13.5 mics and getting Clinimix at 40 and propofol at 20  01/24/2023 tolerating ventilator wean overnight FiO2 50% down  01/25/2023 FiO2 down to 40% this AM -assist control  Awake and alert on vent minimal sedation          Review of Systems  Sedated on ventilator   Objective:     Vital Signs (Most Recent):  Temp: 98.2 °F (36.8 °C) (01/25/23 0701)  Pulse: (!) 59 (01/25/23 1000)  Resp: (!) 21 (01/25/23 1000)  BP: 111/62 (01/25/23 1000)  SpO2: 99 % (01/25/23 1000)   Vital Signs (24h Range):  Temp:  [98.2 °F (36.8 °C)-99.2 °F (37.3 °C)] 98.2 °F (36.8 °C)  Pulse:  [52-73] 59  Resp:  [20-21] 21  SpO2:  [97 %-100 %] 99 %  BP: ()/(51-83) 111/62     Weight: 55.8 kg (123 lb) (new weight with tortoise and wedge)  Body mass index is 21.11 kg/m².    Intake/Output Summary (Last 24 hours) at 1/25/2023 1116  Last data filed at 1/25/2023 0800  Gross per 24 hour   Intake 2026.09 ml   Output 1295 ml   Net 731.09 ml      Physical Exam  Vitals and nursing note reviewed.   Constitutional:       Comments: Sedated on vent   HENT:      Head: Normocephalic and atraumatic.   Cardiovascular:      Rate and Rhythm: Normal rate.      Heart sounds: Normal heart sounds.   Pulmonary:      Effort: Pulmonary effort is normal.      Breath sounds: Normal breath sounds.      Comments: On vent  Abdominal:      General: Abdomen is flat.      Palpations: Abdomen is soft.       Significant Labs: All pertinent labs within the past 24 hours have been reviewed.    Significant Imaging: I have reviewed all  pertinent imaging results/findings within the past 24 hours.      Assessment/Plan:      * Acute respiratory failure with hypoxia and hypercarbia  Patient with Hypercapnic and Hypoxic Respiratory failure which is Acute on chronic.  she is not on home oxygen. Supplemental oxygen was provided and noted- Vent Mode: SIMV  Oxygen Concentration (%):  [35-40] 35  Resp Rate Total:  [18 br/min-31 br/min] 23 br/min  Vt Set:  [450 mL] 450 mL  PEEP/CPAP:  [5 cmH20] 5 cmH20  Pressure Support:  [10 cmH20] 10 cmH20  Mean Airway Pressure:  [9.3 xrB78-50 cmH20] 10 cmH20.   Signs/symptoms of respiratory failure include- tachypnea, increased work of breathing and respiratory distress. Contributing diagnoses includes - CHF and COPD Labs and images were reviewed. Patient Has recent ABG, which has been reviewed. Will treat underlying causes and adjust management of respiratory failure as follows-     She will remain on the AC, get our case management consult to attempt for LTAC placement continue all other meds and treatment including her leave albuterol nebs IV steroids IV antibiotics follow up on cultures as they report    Will continue to try and wean ventilator overnight  SIMV    Recent weight loss  Nutrition consulted. Most recent weight and BMI monitored-  Patient will have EGD and colonoscopy once stabilized from a respiratory standpoint                        Measurements:  Wt Readings from Last 1 Encounters:   01/23/23 51.1 kg (112 lb 11.2 oz)   Body mass index is 19.34 kg/m².    Recommendations:      Patient has been screened and assessed by RD. RD will follow patient.      Transaminitis   Improved will recheck labs CMP in a.m.      Hematuria   Recently been worked up by Urology has some small stones family reports other workup was negative including what sounds like a endoscopy or bladder scope      Microcytic anemia   Patient was scheduled for EGD and had a transfusion at the time of admission here however EGD was postponed  due to acute respiratory failure with hypercarbia requiring intubation she will follow up with Dr. Abel once he is more stable  H&H remains stable will continue to monitor      Atrial fibrillation with RVR  More p.o. and recheck levels in a.m.Patient with Paroxysmal (<7 days) atrial fibrillation which is controlled currently with Amiodarone. Patient is currently in sinus rhythm.[id number] Score: 3. HASBLED Score:  . Anticoagulation not indicated due to anemia.    Her heart rate has been stable currently she is 62 and sinus    Severe sepsis with septic shock  This patient does have evidence of infective focus  My overall impression is septic shock. Vital signs were reviewed and noted in progress note.  Antibiotics given-   Antibiotics (From admission, onward)      Start     Stop Route Frequency Ordered    01/22/23 0400  piperacillin-tazobactam (ZOSYN) 4.5 g in dextrose 5 % in water (D5W) 5 % 100 mL IVPB (MB+)         -- IV Every 8 hours (non-standard times) 01/21/23 1521          Cultures were taken-   Microbiology Results (last 7 days)       ** No results found for the last 168 hours. **          Latest lactate reviewed, they are-  No results for input(s): LACTATE in the last 72 hours.    Organ dysfunction indicated by Acute kidney injury, Encephalopathy  and Acute respiratory failure  Source-      Source control Achieved by- iv abx and This patient does have evidence of infective focus  My overall impression is septic shock.  Source:  Sputum culture from intubation is pending shows 3 organisms growing rare amounts will follow up  Antibiotics given-   Antibiotics (From admission, onward)      Start     Stop Route Frequency Ordered    01/22/23 0400  piperacillin-tazobactam (ZOSYN) 4.5 g in dextrose 5 % in water (D5W) 5 % 100 mL IVPB (MB+)         -- IV Every 8 hours (non-standard times) 01/21/23 1521          Latest lactate reviewed-  No results for input(s): LACTATE in the last 72 hours.  Organ dysfunction  indicated by Acute kidney injury and Acute respiratory failure    Shock with decreased perfusion noted, Fluid challenge  was given at 30cc/kg and was not given at 30cc/kg due to      Post- resuscitation assessment- (Done after fluids given for shock)  Vital signs post fluid administrations were-  Temp Readings from Last 1 Encounters:   01/23/23 99.4 °F (37.4 °C)     BP Readings from Last 1 Encounters:   01/23/23 132/72     Pulse Readings from Last 1 Encounters:   01/23/23 68       Perfusion exam was performed within 6 hours of septic shock presentation after bolus shows Adequate tissue perfusion assessed by non-invasive monitoring  Will Start Pressors- Levophed for MAP of 65  Source control achieved by:  Iv abx   echo done showed EF of 60-65% with normal right  Ventricular function   CT angio chest done on admission did not show any PE and no acute finding     CT of the abdomen done on admit showed some small renal calculi no other acute finding    Patient remains on the Levophed at 13.5 mils per hour which is a 0.12 milligram/kilogram per minute dose we will continue and wean as tolerated        Hypokalemia    Her potassium is improved we will continue the p.o. replacement for now recheck labs in a.m.      VTE Risk Mitigation (From admission, onward)           Ordered     Place sequential compression device  Until discontinued         01/21/23 1326                    Discharge Planning   LASHAWN:      Code Status: Full Code   Is the patient medically ready for discharge?:     Reason for patient still in hospital (select all that apply): Patient trending condition, Laboratory test, Treatment and Imaging             Critical care time = 40 minutes         Ac Childers MD  Department of Hospital Medicine   [facility]

## 2023-01-26 LAB
ABS NEUT CALC (OHS): 6.14 X10(3)/MCL (ref 2.1–9.2)
ANION GAP SERPL CALC-SCNC: 3 MEQ/L (ref 2–13)
BASE EXCESS BLD CALC-SCNC: 12.1 MMOL/L
BASOPHILS # BLD AUTO: 0.02 X10(3)/MCL (ref 0.01–0.08)
BASOPHILS NFR BLD AUTO: 0.3 % (ref 0.1–1.2)
BASOPHILS NFR BLD MANUAL: 0.07 X10(3)/MCL (ref 0–0.2)
BASOPHILS NFR BLD MANUAL: 1 % (ref 0–2)
BLOOD GAS SAMPLE TYPE (OHS): ABNORMAL
BUN SERPL-MCNC: 18 MG/DL (ref 7–20)
CALCIUM SERPL-MCNC: 8.2 MG/DL (ref 8.4–10.2)
CHLORIDE SERPL-SCNC: 98 MMOL/L (ref 98–110)
CO2 SERPL-SCNC: 38 MMOL/L (ref 21–32)
CREAT SERPL-MCNC: 0.74 MG/DL (ref 0.66–1.25)
CREAT/UREA NIT SERPL: 24 (ref 12–20)
EOSINOPHIL # BLD AUTO: 0.31 X10(3)/MCL (ref 0.04–0.36)
EOSINOPHIL NFR BLD AUTO: 4.2 % (ref 0.7–7)
EOSINOPHIL NFR BLD MANUAL: 0.22 X10(3)/MCL (ref 0–0.9)
EOSINOPHIL NFR BLD MANUAL: 3 % (ref 0–8)
ERYTHROCYTE [DISTWIDTH] IN BLOOD BY AUTOMATED COUNT: 24.9 % (ref 11–14.5)
FIO2 BLOOD GAS (OHS): 35 %
GFR SERPLBLD CREATININE-BSD FMLA CKD-EPI: 89 MLS/MIN/1.73/M2
GLUCOSE SERPL-MCNC: 109 MG/DL (ref 70–115)
HCO3 BLDA-SCNC: 37.7 MMOL/L
HCT VFR BLD AUTO: 30 % (ref 36–48)
HGB BLD-MCNC: 8.4 GM/DL (ref 11.8–16)
IMM GRANULOCYTES # BLD AUTO: 0.03 X10(3)/MCL (ref 0–0.03)
IMM GRANULOCYTES NFR BLD AUTO: 0.4 % (ref 0–0.5)
LYMPHOCYTES # BLD AUTO: 0.56 X10(3)/MCL (ref 1.16–3.74)
LYMPHOCYTES NFR BLD AUTO: 7.5 % (ref 20–55)
LYMPHOCYTES NFR BLD MANUAL: 0.59 X10(3)/MCL
LYMPHOCYTES NFR BLD MANUAL: 8 % (ref 13–40)
MCH RBC QN AUTO: 22 PG (ref 27–34)
MCV RBC AUTO: 78.5 FL (ref 79–99)
MEAN CELL HEMOGLOBIN CONCENTRATION (OHS) G/DL: 28 G/DL (ref 31–37)
MECH RR (OHS): 12 B/MIN
MECH VT (OHS): 450 ML
MODE (OHS): ABNORMAL
MONOCYTES # BLD AUTO: 0.36 X10(3)/MCL (ref 0.24–0.36)
MONOCYTES NFR BLD AUTO: 4.8 % (ref 4.7–12.5)
MONOCYTES NFR BLD MANUAL: 0.37 X10(3)/MCL (ref 0.1–1.3)
MONOCYTES NFR BLD MANUAL: 5 % (ref 2–11)
NEUTROPHILS # BLD AUTO: 6.16 X10(3)/MCL (ref 1.56–6.13)
NEUTROPHILS NFR BLD AUTO: 82.8 % (ref 37–73)
NEUTROPHILS NFR BLD MANUAL: 83 % (ref 47–80)
NOTIFIED (OHS): ABNORMAL
NOTIFIED BY (OHS): ABNORMAL
NRBC BLD AUTO-RTO: 0 % (ref 0–1)
PCO2 BLDA: 56.2 MMHG (ref 35–45)
PEEP (OHS): 5 CMH2O
PH BLDA: 7.45 [PH] (ref 7.38–7.49)
PLATELET # BLD AUTO: 315 X10(3)/MCL (ref 140–371)
PLATELET # BLD EST: ADEQUATE 10*3/UL
PMV BLD AUTO: 10.9 FL (ref 9.4–12.4)
PO2 BLDA: 77.7 MMHG (ref 80–100)
POCT GLUCOSE: 106 MG/DL (ref 70–110)
POCT GLUCOSE: 110 MG/DL (ref 70–110)
POCT GLUCOSE: 112 MG/DL (ref 70–110)
POCT GLUCOSE: 114 MG/DL (ref 70–110)
POTASSIUM SERPL-SCNC: 3.4 MMOL/L (ref 3.5–5.1)
PS (OHS): 10 CMH2O
RBC # BLD AUTO: 3.82 X10(6)/MCL (ref 4–5.1)
SAO2 % BLDA: 94.4 %
SODIUM SERPL-SCNC: 139 MMOL/L (ref 135–145)
WBC # SPEC AUTO: 7.4 X10(3)/MCL (ref 4–11.5)

## 2023-01-26 PROCEDURE — 36600 WITHDRAWAL OF ARTERIAL BLOOD: CPT

## 2023-01-26 PROCEDURE — 85025 COMPLETE CBC W/AUTO DIFF WBC: CPT | Performed by: FAMILY MEDICINE

## 2023-01-26 PROCEDURE — 63600175 PHARM REV CODE 636 W HCPCS: Performed by: FAMILY MEDICINE

## 2023-01-26 PROCEDURE — 20000000 HC ICU ROOM

## 2023-01-26 PROCEDURE — 99900035 HC TECH TIME PER 15 MIN (STAT)

## 2023-01-26 PROCEDURE — 36415 COLL VENOUS BLD VENIPUNCTURE: CPT | Performed by: FAMILY MEDICINE

## 2023-01-26 PROCEDURE — 63600175 PHARM REV CODE 636 W HCPCS

## 2023-01-26 PROCEDURE — 94640 AIRWAY INHALATION TREATMENT: CPT

## 2023-01-26 PROCEDURE — 27200966 HC CLOSED SUCTION SYSTEM

## 2023-01-26 PROCEDURE — 82803 BLOOD GASES ANY COMBINATION: CPT

## 2023-01-26 PROCEDURE — 25000003 PHARM REV CODE 250

## 2023-01-26 PROCEDURE — 80048 BASIC METABOLIC PNL TOTAL CA: CPT | Performed by: FAMILY MEDICINE

## 2023-01-26 PROCEDURE — 85027 COMPLETE CBC AUTOMATED: CPT | Performed by: FAMILY MEDICINE

## 2023-01-26 PROCEDURE — 94761 N-INVAS EAR/PLS OXIMETRY MLT: CPT

## 2023-01-26 PROCEDURE — 99900026 HC AIRWAY MAINTENANCE (STAT)

## 2023-01-26 PROCEDURE — 25000003 PHARM REV CODE 250: Performed by: FAMILY MEDICINE

## 2023-01-26 PROCEDURE — 25000242 PHARM REV CODE 250 ALT 637 W/ HCPCS

## 2023-01-26 PROCEDURE — 27000221 HC OXYGEN, UP TO 24 HOURS

## 2023-01-26 PROCEDURE — 94003 VENT MGMT INPAT SUBQ DAY: CPT

## 2023-01-26 RX ORDER — POTASSIUM CHLORIDE 7.45 MG/ML
10 INJECTION INTRAVENOUS
Status: COMPLETED | OUTPATIENT
Start: 2023-01-26 | End: 2023-01-26

## 2023-01-26 RX ADMIN — IPRATROPIUM BROMIDE 0.5 MG: 0.5 SOLUTION RESPIRATORY (INHALATION) at 07:01

## 2023-01-26 RX ADMIN — IPRATROPIUM BROMIDE 0.5 MG: 0.5 SOLUTION RESPIRATORY (INHALATION) at 08:01

## 2023-01-26 RX ADMIN — METFORMIN HYDROCHLORIDE 500 MG: 500 TABLET, FILM COATED ORAL at 10:01

## 2023-01-26 RX ADMIN — ATORVASTATIN CALCIUM 80 MG: 40 TABLET, FILM COATED ORAL at 10:01

## 2023-01-26 RX ADMIN — LEVALBUTEROL HYDROCHLORIDE 1.25 MG: 1.25 SOLUTION, CONCENTRATE RESPIRATORY (INHALATION) at 08:01

## 2023-01-26 RX ADMIN — OXYBUTYNIN CHLORIDE 5 MG: 5 TABLET, EXTENDED RELEASE ORAL at 10:01

## 2023-01-26 RX ADMIN — PREGABALIN 50 MG: 25 CAPSULE ORAL at 10:01

## 2023-01-26 RX ADMIN — THEOPHYLLINE 400 MG: 400 TABLET, EXTENDED RELEASE ORAL at 10:01

## 2023-01-26 RX ADMIN — FAMOTIDINE 20 MG: 10 INJECTION INTRAVENOUS at 08:01

## 2023-01-26 RX ADMIN — PREGABALIN 50 MG: 25 CAPSULE ORAL at 03:01

## 2023-01-26 RX ADMIN — METFORMIN HYDROCHLORIDE 500 MG: 500 TABLET, FILM COATED ORAL at 06:01

## 2023-01-26 RX ADMIN — OXYCODONE HYDROCHLORIDE AND ACETAMINOPHEN 500 MG: 500 TABLET ORAL at 08:01

## 2023-01-26 RX ADMIN — BUDESONIDE INHALATION 0.5 MG: 0.5 SUSPENSION RESPIRATORY (INHALATION) at 08:01

## 2023-01-26 RX ADMIN — LEVALBUTEROL HYDROCHLORIDE 1.25 MG: 1.25 SOLUTION, CONCENTRATE RESPIRATORY (INHALATION) at 03:01

## 2023-01-26 RX ADMIN — POTASSIUM CHLORIDE 10 MEQ: 7.46 INJECTION, SOLUTION INTRAVENOUS at 12:01

## 2023-01-26 RX ADMIN — FLUTICASONE PROPIONATE 50 MCG: 50 SPRAY, METERED NASAL at 10:01

## 2023-01-26 RX ADMIN — PROPOFOL 15 MCG/KG/MIN: 10 INJECTION, EMULSION INTRAVENOUS at 08:01

## 2023-01-26 RX ADMIN — LEVALBUTEROL HYDROCHLORIDE 1.25 MG: 1.25 SOLUTION, CONCENTRATE RESPIRATORY (INHALATION) at 07:01

## 2023-01-26 RX ADMIN — POTASSIUM CHLORIDE 10 MEQ: 7.46 INJECTION, SOLUTION INTRAVENOUS at 03:01

## 2023-01-26 RX ADMIN — IPRATROPIUM BROMIDE 0.5 MG: 0.5 SOLUTION RESPIRATORY (INHALATION) at 03:01

## 2023-01-26 RX ADMIN — LEVALBUTEROL HYDROCHLORIDE 1.25 MG: 1.25 SOLUTION, CONCENTRATE RESPIRATORY (INHALATION) at 11:01

## 2023-01-26 RX ADMIN — FERROUS SULFATE TAB 325 MG (65 MG ELEMENTAL FE) 1 EACH: 325 (65 FE) TAB at 10:01

## 2023-01-26 RX ADMIN — LEUCINE, PHENYLALANINE, LYSINE, METHIONINE, ISOLEUCINE, VALINE, HISTIDINE, THREONINE, TRYPTOPHAN, ALANINE, GLYCINE, ARGININE, PROLINE, SERINE, TYROSINE, SODIUM ACETATE, DIBASIC POTASSIUM PHOSPHATE, MAGNESIUM CHLORIDE, SODIUM CHLORIDE, CALCIUM CHLORIDE, DEXTROSE
311; 238; 247; 170; 255; 247; 204; 179; 77; 880; 438; 489; 289; 213; 17; 297; 261; 51; 77; 33; 5 INJECTION INTRAVENOUS at 04:01

## 2023-01-26 RX ADMIN — PIPERACILLIN SODIUM AND TAZOBACTAM SODIUM 4.5 G: 4; .5 INJECTION, POWDER, LYOPHILIZED, FOR SOLUTION INTRAVENOUS at 08:01

## 2023-01-26 RX ADMIN — FLUTICASONE PROPIONATE 50 MCG: 50 SPRAY, METERED NASAL at 09:01

## 2023-01-26 RX ADMIN — BUDESONIDE INHALATION 0.5 MG: 0.5 SUSPENSION RESPIRATORY (INHALATION) at 07:01

## 2023-01-26 RX ADMIN — FAMOTIDINE 20 MG: 10 INJECTION INTRAVENOUS at 10:01

## 2023-01-26 RX ADMIN — FERROUS SULFATE TAB 325 MG (65 MG ELEMENTAL FE) 1 EACH: 325 (65 FE) TAB at 09:01

## 2023-01-26 RX ADMIN — OXYCODONE HYDROCHLORIDE AND ACETAMINOPHEN 500 MG: 500 TABLET ORAL at 10:01

## 2023-01-26 RX ADMIN — PIPERACILLIN SODIUM AND TAZOBACTAM SODIUM 4.5 G: 4; .5 INJECTION, POWDER, LYOPHILIZED, FOR SOLUTION INTRAVENOUS at 03:01

## 2023-01-26 RX ADMIN — IPRATROPIUM BROMIDE 0.5 MG: 0.5 SOLUTION RESPIRATORY (INHALATION) at 11:01

## 2023-01-26 RX ADMIN — POTASSIUM CHLORIDE 10 MEQ: 7.46 INJECTION, SOLUTION INTRAVENOUS at 02:01

## 2023-01-26 RX ADMIN — PREGABALIN 50 MG: 25 CAPSULE ORAL at 08:01

## 2023-01-26 RX ADMIN — PIPERACILLIN SODIUM AND TAZOBACTAM SODIUM 4.5 G: 4; .5 INJECTION, POWDER, LYOPHILIZED, FOR SOLUTION INTRAVENOUS at 12:01

## 2023-01-26 RX ADMIN — FUROSEMIDE 20 MG: 20 TABLET ORAL at 10:01

## 2023-01-26 RX ADMIN — FERROUS SULFATE TAB 325 MG (65 MG ELEMENTAL FE) 1 EACH: 325 (65 FE) TAB at 03:01

## 2023-01-26 RX ADMIN — POLYETHYLENE GLYCOL 3350 17 G: 17 POWDER, FOR SOLUTION ORAL at 10:01

## 2023-01-26 RX ADMIN — POTASSIUM CHLORIDE 10 MEQ: 7.46 INJECTION, SOLUTION INTRAVENOUS at 04:01

## 2023-01-26 NOTE — PROGRESS NOTES
[facility] Medicine  Progress Note    Patient Name: Michel De La Paz  MRN: [id number]  Patient Class: IP- Inpatient   Admission Date: 1/13/2023  Length of Stay: 13 days  Attending Physician: Ac Childers MD  Primary Care Provider: WESLY Best        Subjective:     Principal Problem:Acute respiratory failure with hypoxia and hypercarbia        HPI:   68 yo F admitted overnight with worsening weakenss, sob and tachycardia. H/  H low initially and got 2 U pRBC overnight. H/H stable this and trend up.  EKG atrial fibrillation with rvr better.            Overview/Hospital Course:   68 yo F admitted overnight with worsening weakenss, sob and tachycardia. H/  H low initially and got 2 U pRBC overnight. H/H stable this and trend up.  EKG atrial fibrillation with rvr better.    01/15   HR better controlled overnight on amiodarone increased TID    01/16 c/o constipation overngiht and this AM  iron low on panel  HR stable overnight  01/17/2023 pt c/o no bm x 2 weeks.  H&H has been stable  01/18/2023 denies pain, no BM yet, H&H stable, very weak and unsteady gait, working with PT.  01/19/2023 pt had large BM today, still very weak, got SOB overnight placed on 7L oximizer due to drop in oxygen sats.  Echo EF 55-60% and CXR shows pulmonary edema.  Pt c/o feeling weak.  Surgery plans for EGD in am.  pt has had recent weight loss about 4 kng since April, was 58 kg now 54, BMI down to 20.  01/20/2023 pt more obtunded overnight this am with increased weakness and not following commands, stat ABG done showed PCO2 elevated and acute respiratory acidosis, transferred pt to ICU and placed on BIPAP, 1 hr ABG very minimal improvement and pt GSC of 6 low with minimally responsive withdraws to pain, no verbal responses  01/21/2023 pt stable on vent overnight, abg this am looks good, will start to wean, CXR no infiltrates, blood and urine cx repeated and pending.     01/22/2023 patient remained  stable on the vent were able to wean her rate a bit however her pCO2 did go up to the 80s ABG is still reasonable at 7.3  but with pCO2 very high in the 80  we put him back on AC with a rate of 16  01/23/2023 patient stable overnight her ABG actually looks better this a.m. her pCO2 has normalized after we switched her back to AC with a rate is 16 she is tolerating currently her FiO2 is at 50% respiratory rate is 20 she is still on the Levophed at 13.5 mics and getting Clinimix at 40 and propofol at 20  01/24/2023 tolerating ventilator wean overnight FiO2 50% down  01/25/2023 FiO2 down to 40% this AM -assist control  Awake and alert on vent minimal sedation  01/26/2023 stable on simv overnight but PCO2 trending up to 56.2 off AC mode  Awakening with sedation down  Cxr stable   Blood counts improved          Review of Systems   All other systems reviewed and are negative.  Objective:     Vital Signs (Most Recent):  Temp: 98.1 °F (36.7 °C) (01/26/23 0705)  Pulse: 69 (01/26/23 0900)  Resp: 18 (01/26/23 0925)  BP: 115/69 (01/26/23 1002)  SpO2: 100 % (01/26/23 0900) Vital Signs (24h Range):  Temp:  [97.3 °F (36.3 °C)-98.9 °F (37.2 °C)] 98.1 °F (36.7 °C)  Pulse:  [53-75] 69  Resp:  [17-25] 18  SpO2:  [94 %-100 %] 100 %  BP: ()/(50-72) 115/69     Weight: 58.5 kg (129 lb)  Body mass index is 22.14 kg/m².    Intake/Output Summary (Last 24 hours) at 1/26/2023 1054  Last data filed at 1/26/2023 0501  Gross per 24 hour   Intake 1730.8 ml   Output 1730 ml   Net 0.8 ml      Physical Exam  Vitals and nursing note reviewed.   Constitutional:       Comments: Sedated on vent   HENT:      Head: Normocephalic and atraumatic.   Cardiovascular:      Rate and Rhythm: Normal rate.      Heart sounds: Normal heart sounds.   Pulmonary:      Effort: Pulmonary effort is normal.      Breath sounds: Normal breath sounds.      Comments: On vent  Abdominal:      General: Abdomen is flat.      Palpations: Abdomen is soft.       Significant  Labs: All pertinent labs within the past 24 hours have been reviewed.    Significant Imaging: I have reviewed all pertinent imaging results/findings within the past 24 hours.      Assessment/Plan:      * Acute respiratory failure with hypoxia and hypercarbia  Patient with Hypercapnic and Hypoxic Respiratory failure which is Acute on chronic.  she is not on home oxygen. Supplemental oxygen was provided and noted- Vent Mode: SIMV  Oxygen Concentration (%):  [35] 35  Resp Rate Total:  [15 br/min-27 br/min] 22 br/min  Vt Set:  [450 mL] 450 mL  PEEP/CPAP:  [5 cmH20] 5 cmH20  Pressure Support:  [10 cmH20] 10 cmH20  Mean Airway Pressure:  [9.2 ywQ87-06 cmH20] 12 cmH20.   Signs/symptoms of respiratory failure include- tachypnea, increased work of breathing and respiratory distress. Contributing diagnoses includes - CHF and COPD Labs and images were reviewed. Patient Has recent ABG, which has been reviewed. Will treat underlying causes and adjust management of respiratory failure as follows-     She will remain on the AC, get our case management consult to attempt for LTAC placement continue all other meds and treatment including her leave albuterol nebs IV steroids IV antibiotics follow up on cultures as they report    Continue to try and wean ventilator overnight from SIMV to spontaneous if tolerated     Recent weight loss  Nutrition consulted. Most recent weight and BMI monitored-  Patient will have EGD and colonoscopy once stabilized from a respiratory standpoint                        Measurements:  Wt Readings from Last 1 Encounters:   01/23/23 51.1 kg (112 lb 11.2 oz)   Body mass index is 19.34 kg/m².    Recommendations:      Patient has been screened and assessed by RD. RD will follow patient.      Transaminitis   Improved will recheck labs CMP in a.m.      Hematuria   Recently been worked up by Urology has some small stones family reports other workup was negative including what sounds like a endoscopy or bladder  scope      Microcytic anemia   Patient was scheduled for EGD and had a transfusion at the time of admission here however EGD was postponed due to acute respiratory failure with hypercarbia requiring intubation she will follow up with Dr. Abel once he is more stable  H&H remains stable will continue to monitor      Atrial fibrillation with RVR  More p.o. and recheck levels in a.m.Patient with Paroxysmal (<7 days) atrial fibrillation which is controlled currently with Amiodarone. Patient is currently in sinus rhythm.[id number] Score: 3. HASBLED Score:  . Anticoagulation not indicated due to anemia.    Her heart rate has been stable currently she is 62 and sinus    Rate controlled overnight     Severe sepsis with septic shock  This patient does have evidence of infective focus  My overall impression is septic shock. Vital signs were reviewed and noted in progress note.  Antibiotics given-   Antibiotics (From admission, onward)    Start     Stop Route Frequency Ordered    01/22/23 0400  piperacillin-tazobactam (ZOSYN) 4.5 g in dextrose 5 % in water (D5W) 5 % 100 mL IVPB (MB+)         -- IV Every 8 hours (non-standard times) 01/21/23 1521        Cultures were taken-   Microbiology Results (last 7 days)     ** No results found for the last 168 hours. **        Latest lactate reviewed, they are-  No results for input(s): LACTATE in the last 72 hours.    Organ dysfunction indicated by Acute kidney injury, Encephalopathy  and Acute respiratory failure  Source-      Source control Achieved by- iv abx and This patient does have evidence of infective focus  My overall impression is septic shock.  Source:  Sputum culture from intubation is pending shows 3 organisms growing rare amounts will follow up  Antibiotics given-   Antibiotics (From admission, onward)    Start     Stop Route Frequency Ordered    01/22/23 0400  piperacillin-tazobactam (ZOSYN) 4.5 g in dextrose 5 % in water (D5W) 5 % 100 mL IVPB (MB+)         -- IV  Every 8 hours (non-standard times) 01/21/23 1521        Latest lactate reviewed-  No results for input(s): LACTATE in the last 72 hours.  Organ dysfunction indicated by Acute kidney injury and Acute respiratory failure    Shock with decreased perfusion noted, Fluid challenge  was given at 30cc/kg and was not given at 30cc/kg due to      Post- resuscitation assessment- (Done after fluids given for shock)  Vital signs post fluid administrations were-  Temp Readings from Last 1 Encounters:   01/23/23 99.4 °F (37.4 °C)     BP Readings from Last 1 Encounters:   01/23/23 132/72     Pulse Readings from Last 1 Encounters:   01/23/23 68       Perfusion exam was performed within 6 hours of septic shock presentation after bolus shows Adequate tissue perfusion assessed by non-invasive monitoring  Will Start Pressors- Levophed for MAP of 65  Source control achieved by:  Iv abx   echo done showed EF of 60-65% with normal right  Ventricular function   CT angio chest done on admission did not show any PE and no acute finding     CT of the abdomen done on admit showed some small renal calculi no other acute finding    Patient remains on the Levophed at 13.5 mils per hour which is a 0.12 milligram/kilogram per minute dose we will continue and wean as tolerated        Hypokalemia    Her potassium is improved we will continue the p.o. replacement for now recheck labs in a.m.      VTE Risk Mitigation (From admission, onward)         Ordered     Place sequential compression device  Until discontinued         01/21/23 1326                Discharge Planning   LASHAWN:      Code Status: Full Code   Is the patient medically ready for discharge?:     Reason for patient still in hospital (select all that apply): Patient trending condition, Laboratory test, Treatment and Imaging             Critical care time = 42 minutes         Ac Childers MD  Department of Hospital Medicine   [facility]

## 2023-01-26 NOTE — SUBJECTIVE & OBJECTIVE
Review of Systems   All other systems reviewed and are negative.  Objective:     Vital Signs (Most Recent):  Temp: 98.1 °F (36.7 °C) (01/26/23 0705)  Pulse: 69 (01/26/23 0900)  Resp: 18 (01/26/23 0925)  BP: 115/69 (01/26/23 1002)  SpO2: 100 % (01/26/23 0900) Vital Signs (24h Range):  Temp:  [97.3 °F (36.3 °C)-98.9 °F (37.2 °C)] 98.1 °F (36.7 °C)  Pulse:  [53-75] 69  Resp:  [17-25] 18  SpO2:  [94 %-100 %] 100 %  BP: ()/(50-72) 115/69     Weight: 58.5 kg (129 lb)  Body mass index is 22.14 kg/m².    Intake/Output Summary (Last 24 hours) at 1/26/2023 1054  Last data filed at 1/26/2023 0501  Gross per 24 hour   Intake 1730.8 ml   Output 1730 ml   Net 0.8 ml      Physical Exam  Vitals and nursing note reviewed.   Constitutional:       Comments: Sedated on vent   HENT:      Head: Normocephalic and atraumatic.   Cardiovascular:      Rate and Rhythm: Normal rate.      Heart sounds: Normal heart sounds.   Pulmonary:      Effort: Pulmonary effort is normal.      Breath sounds: Normal breath sounds.      Comments: On vent  Abdominal:      General: Abdomen is flat.      Palpations: Abdomen is soft.       Significant Labs: All pertinent labs within the past 24 hours have been reviewed.    Significant Imaging: I have reviewed all pertinent imaging results/findings within the past 24 hours.

## 2023-01-26 NOTE — PLAN OF CARE
Rec'd call from Hamida Ceron of [facility] stating Jonny BHAGAT unable to approve and is requesting a peer to peer to be scheduled today before 1:00 @ [phone]. Message given to  and Hospitalist.

## 2023-01-26 NOTE — ASSESSMENT & PLAN NOTE
More p.o. and recheck levels in a.m.Patient with Paroxysmal (<7 days) atrial fibrillation which is controlled currently with Amiodarone. Patient is currently in sinus rhythm.[id number] Score: 3. HASBLED Score:  . Anticoagulation not indicated due to anemia.    Her heart rate has been stable currently she is 62 and sinus    Rate controlled overnight

## 2023-01-26 NOTE — PT/OT/SLP PROGRESS
Physical Therapy Treatment    Patient Name:  Michel De La Paz   MRN:  [id number]    Recommendations:     Discharge Recommendations: nursing facility, skilled, rehabilitation facility, LTACH (long-term acute care hospital)  Discharge Equipment Recommendations:    Barriers to discharge: None    Assessment:     Michel De La Paz is a 67 y.o. female admitted with a medical diagnosis of Acute respiratory failure with hypoxia and hypercarbia.  She presents with the following impairments/functional limitations: weakness, impaired endurance, impaired functional mobility, impaired balance .    Rehab Prognosis: Fair; patient would benefit from acute skilled PT services to address these deficits and reach maximum level of function.    Recent Surgery: * No surgery found *      Plan:     During this hospitalization, patient to be seen daily to address the identified rehab impairments via therapeutic activities and progress toward the following goals:    Plan of Care Expires:       Subjective     Chief Complaint: overall weakness with bed mob and tf.  Patient/Family Comments/goals: increase le strength to improved transfers and sitting tolerance.  Pain/Comfort:  Pain Rating 1: 0/10      Objective:     Communicated with nsg prior to session.  Patient found supine with bed alarm, blood pressure cuff, waterman catheter, oxygen, ventilator upon PT entry to room.     General Precautions: Standard,    Orthopedic Precautions:    Braces:    Respiratory Status:  vent     Functional Mobility:        AM-PAC 6 CLICK MOBILITY  Turning over in bed (including adjusting bedclothes, sheets and blankets)?: 1  Sitting down on and standing up from a chair with arms (e.g., wheelchair, bedside commode, etc.): 1  Moving from lying on back to sitting on the side of the bed?: 1  Moving to and from a bed to a chair (including a wheelchair)?: 1  Need to walk in hospital room?: 1  Climbing 3-5 steps with a railing?: 1  Basic Mobility Total Score:  6       Treatment & Education:  AAROM to bilateral UE/LE's to improved mobility and decrease risk of contractures.     Patient left supine with all lines intact..    GOALS:   Multidisciplinary Problems       Physical Therapy Goals          Problem: Physical Therapy    Goal Priority Disciplines Outcome Goal Variances Interventions   Physical Therapy Goal     PT, PT/OT Ongoing, Progressing     Description: Goals to be met by: discharge     Patient will increase functional independence with mobility by performin. Supine to sit with MInimal Assistance                         Time Tracking:     PT Received On: 23  PT Start Time: 715     PT Stop Time: 730  PT Total Time (min): 15 min     Billable Minutes: Therapeutic Activity 15    Treatment Type: Treatment  PT/PTA: PTA     PTA Visit Number: 4     2023

## 2023-01-26 NOTE — ASSESSMENT & PLAN NOTE
Patient with Hypercapnic and Hypoxic Respiratory failure which is Acute on chronic.  she is not on home oxygen. Supplemental oxygen was provided and noted- Vent Mode: SIMV  Oxygen Concentration (%):  [35] 35  Resp Rate Total:  [15 br/min-27 br/min] 22 br/min  Vt Set:  [450 mL] 450 mL  PEEP/CPAP:  [5 cmH20] 5 cmH20  Pressure Support:  [10 cmH20] 10 cmH20  Mean Airway Pressure:  [9.2 ubQ74-84 cmH20] 12 cmH20.   Signs/symptoms of respiratory failure include- tachypnea, increased work of breathing and respiratory distress. Contributing diagnoses includes - CHF and COPD Labs and images were reviewed. Patient Has recent ABG, which has been reviewed. Will treat underlying causes and adjust management of respiratory failure as follows-     She will remain on the AC, get our case management consult to attempt for LTAC placement continue all other meds and treatment including her leave albuterol nebs IV steroids IV antibiotics follow up on cultures as they report    Continue to try and wean ventilator overnight from SIMV to spontaneous if tolerated

## 2023-01-27 ENCOUNTER — ANESTHESIA (OUTPATIENT)
Dept: INTENSIVE CARE | Facility: HOSPITAL | Age: 68
DRG: 207 | End: 2023-01-27
Payer: MEDICARE

## 2023-01-27 ENCOUNTER — ANESTHESIA EVENT (OUTPATIENT)
Dept: INTENSIVE CARE | Facility: HOSPITAL | Age: 68
DRG: 207 | End: 2023-01-27
Payer: MEDICARE

## 2023-01-27 VITALS — HEART RATE: 91 BPM | OXYGEN SATURATION: 100 %

## 2023-01-27 LAB
ABS NEUT CALC (OHS): 7.14 X10(3)/MCL (ref 2.1–9.2)
ANION GAP SERPL CALC-SCNC: 3 MEQ/L (ref 2–13)
ANISOCYTOSIS BLD QL SMEAR: ABNORMAL
BASE EXCESS BLD CALC-SCNC: 10.2 MMOL/L
BASE EXCESS BLD CALC-SCNC: 11.1 MMOL/L
BLOOD GAS SAMPLE TYPE (OHS): ABNORMAL
BLOOD GAS SAMPLE TYPE (OHS): ABNORMAL
BUN SERPL-MCNC: 18 MG/DL (ref 7–20)
CALCIUM SERPL-MCNC: 8.2 MG/DL (ref 8.4–10.2)
CHLORIDE SERPL-SCNC: 99 MMOL/L (ref 98–110)
CO2 SERPL-SCNC: 38 MMOL/L (ref 21–32)
CREAT SERPL-MCNC: 0.75 MG/DL (ref 0.66–1.25)
CREAT/UREA NIT SERPL: 24 (ref 12–20)
EOSINOPHIL NFR BLD MANUAL: 0.34 X10(3)/MCL (ref 0–0.9)
EOSINOPHIL NFR BLD MANUAL: 4 % (ref 0–8)
ERYTHROCYTE [DISTWIDTH] IN BLOOD BY AUTOMATED COUNT: 24.7 % (ref 11–14.5)
FIO2 BLOOD GAS (OHS): 100 %
FIO2 BLOOD GAS (OHS): 35 %
GFR SERPLBLD CREATININE-BSD FMLA CKD-EPI: 88 MLS/MIN/1.73/M2
GLUCOSE SERPL-MCNC: 124 MG/DL (ref 70–115)
HCO3 BLDA-SCNC: 37.3 MMOL/L
HCO3 BLDA-SCNC: 38.2 MMOL/L
HCT VFR BLD AUTO: 30.4 % (ref 36–48)
HGB BLD-MCNC: 8.4 GM/DL (ref 11.8–16)
HYPOCHROMIA BLD QL SMEAR: ABNORMAL
IMM GRANULOCYTES # BLD AUTO: 0.03 X10(3)/MCL (ref 0–0.03)
IMM GRANULOCYTES NFR BLD AUTO: 0.4 % (ref 0–0.5)
LYMPHOCYTES NFR BLD MANUAL: 0.34 X10(3)/MCL
LYMPHOCYTES NFR BLD MANUAL: 4 % (ref 13–40)
MCH RBC QN AUTO: 22 PG (ref 27–34)
MCV RBC AUTO: 79.8 FL (ref 79–99)
MEAN CELL HEMOGLOBIN CONCENTRATION (OHS) G/DL: 27.6 G/DL (ref 31–37)
MECH RR (OHS): 16 B/MIN
MECH VT (OHS): 450 ML
MICROCYTES BLD QL SMEAR: ABNORMAL
MODE (OHS): ABNORMAL
MODE (OHS): ABNORMAL
MONOCYTES NFR BLD MANUAL: 0.59 X10(3)/MCL (ref 0.1–1.3)
MONOCYTES NFR BLD MANUAL: 7 % (ref 2–11)
NEUTROPHILS NFR BLD MANUAL: 84 % (ref 47–80)
NEUTS BAND NFR BLD MANUAL: 1 % (ref 0–11)
NOTIFIED (OHS): ABNORMAL
NOTIFIED BY (OHS): ABNORMAL
NOTIFIED TIME (OHS): 1556
NRBC BLD AUTO-RTO: 0 % (ref 0–1)
PCO2 BLDA: 66.2 MMHG (ref 35–45)
PCO2 BLDA: 66.5 MMHG (ref 35–45)
PEEP (OHS): 5 CMH2O
PEEP (OHS): 5 CMH2O
PH BLDA: 7.37 [PH] (ref 7.38–7.49)
PH BLDA: 7.38 [PH] (ref 7.38–7.49)
PLATELET # BLD AUTO: 328 X10(3)/MCL (ref 140–371)
PLATELET # BLD EST: ADEQUATE 10*3/UL
PMV BLD AUTO: 10.9 FL (ref 9.4–12.4)
PO2 BLDA: 524.3 MMHG (ref 80–100)
PO2 BLDA: 75.3 MMHG (ref 80–100)
POCT GLUCOSE: 105 MG/DL (ref 70–110)
POCT GLUCOSE: 124 MG/DL (ref 70–110)
POCT GLUCOSE: 125 MG/DL (ref 70–110)
POCT GLUCOSE: 125 MG/DL (ref 70–110)
POTASSIUM SERPL-SCNC: 3.8 MMOL/L (ref 3.5–5.1)
PS (OHS): 10 CMH2O
PS (OHS): 10 CMH2O
RBC # BLD AUTO: 3.81 X10(6)/MCL (ref 4–5.1)
SAO2 % BLDA: 93.5 %
SAO2 % BLDA: 99.6 %
SODIUM SERPL-SCNC: 140 MMOL/L (ref 135–145)
WBC # SPEC AUTO: 8.4 X10(3)/MCL (ref 4–11.5)

## 2023-01-27 PROCEDURE — 63600175 PHARM REV CODE 636 W HCPCS

## 2023-01-27 PROCEDURE — 36415 COLL VENOUS BLD VENIPUNCTURE: CPT | Performed by: FAMILY MEDICINE

## 2023-01-27 PROCEDURE — 63600175 PHARM REV CODE 636 W HCPCS: Performed by: NURSE ANESTHETIST, CERTIFIED REGISTERED

## 2023-01-27 PROCEDURE — 25000242 PHARM REV CODE 250 ALT 637 W/ HCPCS: Performed by: FAMILY MEDICINE

## 2023-01-27 PROCEDURE — 25000003 PHARM REV CODE 250

## 2023-01-27 PROCEDURE — 94003 VENT MGMT INPAT SUBQ DAY: CPT

## 2023-01-27 PROCEDURE — 85025 COMPLETE CBC W/AUTO DIFF WBC: CPT | Performed by: FAMILY MEDICINE

## 2023-01-27 PROCEDURE — 20000000 HC ICU ROOM

## 2023-01-27 PROCEDURE — 80048 BASIC METABOLIC PNL TOTAL CA: CPT | Performed by: FAMILY MEDICINE

## 2023-01-27 PROCEDURE — 37000009 HC ANESTHESIA EA ADD 15 MINS

## 2023-01-27 PROCEDURE — 37000008 HC ANESTHESIA 1ST 15 MINUTES

## 2023-01-27 PROCEDURE — 25000003 PHARM REV CODE 250: Performed by: NURSE ANESTHETIST, CERTIFIED REGISTERED

## 2023-01-27 PROCEDURE — 94761 N-INVAS EAR/PLS OXIMETRY MLT: CPT

## 2023-01-27 PROCEDURE — 82803 BLOOD GASES ANY COMBINATION: CPT

## 2023-01-27 PROCEDURE — 99900026 HC AIRWAY MAINTENANCE (STAT)

## 2023-01-27 PROCEDURE — 99900035 HC TECH TIME PER 15 MIN (STAT)

## 2023-01-27 PROCEDURE — 31500 INSERT EMERGENCY AIRWAY: CPT

## 2023-01-27 PROCEDURE — 27200966 HC CLOSED SUCTION SYSTEM

## 2023-01-27 PROCEDURE — 27000221 HC OXYGEN, UP TO 24 HOURS

## 2023-01-27 PROCEDURE — 25000242 PHARM REV CODE 250 ALT 637 W/ HCPCS

## 2023-01-27 PROCEDURE — 94640 AIRWAY INHALATION TREATMENT: CPT

## 2023-01-27 PROCEDURE — 25000003 PHARM REV CODE 250: Performed by: FAMILY MEDICINE

## 2023-01-27 PROCEDURE — 97530 THERAPEUTIC ACTIVITIES: CPT | Mod: CQ

## 2023-01-27 PROCEDURE — 85027 COMPLETE CBC AUTOMATED: CPT | Performed by: FAMILY MEDICINE

## 2023-01-27 PROCEDURE — 36600 WITHDRAWAL OF ARTERIAL BLOOD: CPT

## 2023-01-27 RX ORDER — VECURONIUM BROMIDE FOR INJECTION 1 MG/ML
INJECTION, POWDER, LYOPHILIZED, FOR SOLUTION INTRAVENOUS
Status: DISCONTINUED | OUTPATIENT
Start: 2023-01-27 | End: 2023-01-27

## 2023-01-27 RX ORDER — PROPOFOL 10 MG/ML
VIAL (ML) INTRAVENOUS
Status: DISCONTINUED | OUTPATIENT
Start: 2023-01-27 | End: 2023-01-27

## 2023-01-27 RX ORDER — NOREPINEPHRINE BITARTRATE 1 MG/ML
INJECTION, SOLUTION INTRAVENOUS
Status: COMPLETED
Start: 2023-01-27 | End: 2023-01-27

## 2023-01-27 RX ADMIN — IPRATROPIUM BROMIDE 0.5 MG: 0.5 SOLUTION RESPIRATORY (INHALATION) at 04:01

## 2023-01-27 RX ADMIN — LEVALBUTEROL HYDROCHLORIDE 1.25 MG: 1.25 SOLUTION, CONCENTRATE RESPIRATORY (INHALATION) at 06:01

## 2023-01-27 RX ADMIN — OXYCODONE HYDROCHLORIDE AND ACETAMINOPHEN 500 MG: 500 TABLET ORAL at 09:01

## 2023-01-27 RX ADMIN — FLUTICASONE PROPIONATE 50 MCG: 50 SPRAY, METERED NASAL at 09:01

## 2023-01-27 RX ADMIN — FERROUS SULFATE TAB 325 MG (65 MG ELEMENTAL FE) 1 EACH: 325 (65 FE) TAB at 09:01

## 2023-01-27 RX ADMIN — FUROSEMIDE 20 MG: 20 TABLET ORAL at 09:01

## 2023-01-27 RX ADMIN — PIPERACILLIN SODIUM AND TAZOBACTAM SODIUM 4.5 G: 4; .5 INJECTION, POWDER, LYOPHILIZED, FOR SOLUTION INTRAVENOUS at 03:01

## 2023-01-27 RX ADMIN — RACEPINEPHRINE HYDROCHLORIDE 0.5 ML: 11.25 SOLUTION RESPIRATORY (INHALATION) at 12:01

## 2023-01-27 RX ADMIN — METFORMIN HYDROCHLORIDE 500 MG: 500 TABLET, FILM COATED ORAL at 09:01

## 2023-01-27 RX ADMIN — THEOPHYLLINE 400 MG: 400 TABLET, EXTENDED RELEASE ORAL at 09:01

## 2023-01-27 RX ADMIN — IPRATROPIUM BROMIDE 0.5 MG: 0.5 SOLUTION RESPIRATORY (INHALATION) at 11:01

## 2023-01-27 RX ADMIN — BUDESONIDE INHALATION 0.5 MG: 0.5 SUSPENSION RESPIRATORY (INHALATION) at 08:01

## 2023-01-27 RX ADMIN — NOREPINEPHRINE BITARTRATE 8 MG: 1 INJECTION, SOLUTION, CONCENTRATE INTRAVENOUS at 12:01

## 2023-01-27 RX ADMIN — FERROUS SULFATE TAB 325 MG (65 MG ELEMENTAL FE) 1 EACH: 325 (65 FE) TAB at 03:01

## 2023-01-27 RX ADMIN — FAMOTIDINE 20 MG: 10 INJECTION INTRAVENOUS at 09:01

## 2023-01-27 RX ADMIN — ATORVASTATIN CALCIUM 80 MG: 40 TABLET, FILM COATED ORAL at 09:01

## 2023-01-27 RX ADMIN — PREGABALIN 50 MG: 25 CAPSULE ORAL at 03:01

## 2023-01-27 RX ADMIN — LEVALBUTEROL HYDROCHLORIDE 1.25 MG: 1.25 SOLUTION, CONCENTRATE RESPIRATORY (INHALATION) at 08:01

## 2023-01-27 RX ADMIN — PROPOFOL 40 MG: 10 INJECTION, EMULSION INTRAVENOUS at 12:01

## 2023-01-27 RX ADMIN — IPRATROPIUM BROMIDE 0.5 MG: 0.5 SOLUTION RESPIRATORY (INHALATION) at 06:01

## 2023-01-27 RX ADMIN — LEUCINE, PHENYLALANINE, LYSINE, METHIONINE, ISOLEUCINE, VALINE, HISTIDINE, THREONINE, TRYPTOPHAN, ALANINE, GLYCINE, ARGININE, PROLINE, SERINE, TYROSINE, SODIUM ACETATE, DIBASIC POTASSIUM PHOSPHATE, MAGNESIUM CHLORIDE, SODIUM CHLORIDE, CALCIUM CHLORIDE, DEXTROSE
311; 238; 247; 170; 255; 247; 204; 179; 77; 880; 438; 489; 289; 213; 17; 297; 261; 51; 77; 33; 5 INJECTION INTRAVENOUS at 03:01

## 2023-01-27 RX ADMIN — BUDESONIDE INHALATION 0.5 MG: 0.5 SUSPENSION RESPIRATORY (INHALATION) at 07:01

## 2023-01-27 RX ADMIN — VECURONIUM BROMIDE 5 MG: 10 INJECTION, POWDER, FOR SOLUTION INTRAVENOUS at 12:01

## 2023-01-27 RX ADMIN — METFORMIN HYDROCHLORIDE 500 MG: 500 TABLET, FILM COATED ORAL at 06:01

## 2023-01-27 RX ADMIN — OXYBUTYNIN CHLORIDE 5 MG: 5 TABLET, EXTENDED RELEASE ORAL at 09:01

## 2023-01-27 RX ADMIN — PROPOFOL 15 MCG/KG/MIN: 10 INJECTION, EMULSION INTRAVENOUS at 09:01

## 2023-01-27 RX ADMIN — LEVALBUTEROL HYDROCHLORIDE 1.25 MG: 1.25 SOLUTION, CONCENTRATE RESPIRATORY (INHALATION) at 03:01

## 2023-01-27 RX ADMIN — LEVALBUTEROL HYDROCHLORIDE 1.25 MG: 1.25 SOLUTION, CONCENTRATE RESPIRATORY (INHALATION) at 04:01

## 2023-01-27 RX ADMIN — IPRATROPIUM BROMIDE 0.5 MG: 0.5 SOLUTION RESPIRATORY (INHALATION) at 08:01

## 2023-01-27 RX ADMIN — PREGABALIN 50 MG: 25 CAPSULE ORAL at 09:01

## 2023-01-27 RX ADMIN — POLYETHYLENE GLYCOL 3350 17 G: 17 POWDER, FOR SOLUTION ORAL at 09:01

## 2023-01-27 RX ADMIN — IPRATROPIUM BROMIDE 0.5 MG: 0.5 SOLUTION RESPIRATORY (INHALATION) at 03:01

## 2023-01-27 RX ADMIN — LEVALBUTEROL HYDROCHLORIDE 1.25 MG: 1.25 SOLUTION, CONCENTRATE RESPIRATORY (INHALATION) at 11:01

## 2023-01-27 NOTE — ASSESSMENT & PLAN NOTE
Patient with Hypercapnic and Hypoxic Respiratory failure which is Acute on chronic.  she is not on home oxygen. Supplemental oxygen was provided and noted- Vent Mode: SIMV  Oxygen Concentration (%):  [] 100  Resp Rate Total:  [10 br/min-24 br/min] 16 br/min  Vt Set:  [450 mL] 450 mL  PEEP/CPAP:  [5 ruY31-80 cmH20] 5 cmH20  Pressure Support:  [10 cmH20] 10 cmH20  Mean Airway Pressure:  [7.2 cmH20-9.8 cmH20] 9.8 cmH20.   Signs/symptoms of respiratory failure include- tachypnea, increased work of breathing and respiratory distress. Contributing diagnoses includes - CHF and COPD Labs and images were reviewed. Patient Has recent ABG, which has been reviewed. Will treat underlying causes and adjust management of respiratory failure as follows-     She will remain on the AC, get our case management consult to attempt for LTAC placement continue all other meds and treatment including her leave albuterol nebs IV steroids IV antibiotics follow up on cultures as they report    Continue to try and wean ventilator overnight from SIMV to spontaneous if tolerated     Failed extubation   Had to reintubate and put on ventilator

## 2023-01-27 NOTE — SUBJECTIVE & OBJECTIVE
Review of Systems   All other systems reviewed and are negative.  Objective:     Vital Signs (Most Recent):  Temp: 98.1 °F (36.7 °C) (01/27/23 1101)  Pulse: 89 (01/27/23 1245)  Resp: (!) 32 (01/27/23 1238)  BP: (!) 99/56 (01/27/23 0800)  SpO2: 100 % (01/27/23 1245)   Vital Signs (24h Range):  Temp:  [98.1 °F (36.7 °C)-99.1 °F (37.3 °C)] 98.1 °F (36.7 °C)  Pulse:  [] 91  Resp:  [16-32] 32  SpO2:  [60 %-100 %] 100 %  BP: ()/(51-67) 99/56     Weight: 58.5 kg (129 lb)  Body mass index is 22.14 kg/m².    Intake/Output Summary (Last 24 hours) at 1/27/2023 1322  Last data filed at 1/27/2023 0847  Gross per 24 hour   Intake 2418.62 ml   Output 1525 ml   Net 893.62 ml      Physical Exam  Vitals and nursing note reviewed.   Constitutional:       Comments: Sedated on vent   HENT:      Head: Normocephalic and atraumatic.   Cardiovascular:      Rate and Rhythm: Normal rate.      Heart sounds: Normal heart sounds.   Pulmonary:      Effort: Pulmonary effort is normal.      Breath sounds: Normal breath sounds.      Comments: On vent  Abdominal:      General: Abdomen is flat.      Palpations: Abdomen is soft.       Significant Labs: All pertinent labs within the past 24 hours have been reviewed.    Significant Imaging: I have reviewed all pertinent imaging results/findings within the past 24 hours.

## 2023-01-27 NOTE — PROGRESS NOTES
[facility]    Patient Name:  Michel De La Paz   MRN:  [id number]  Date: 1/27/2023  Diagnosis: Acute respiratory failure with hypoxia and hypercarbia    History:     Past Medical History:   Diagnosis Date    CHF (congestive heart failure)     COPD (chronic obstructive pulmonary disease)     Diverticular disease of large intestine without perforation or abscess     DM (diabetes mellitus)     GERD (gastroesophageal reflux disease)     Mild intermittent asthma, uncomplicated     Neuropathy     Sleep apnea        Social History     Socioeconomic History    Marital status: Unknown   Tobacco Use    Smoking status: Never   Substance and Sexual Activity    Alcohol use: Never       Precautions:     Allergies as of 01/21/2023 - Reviewed 01/21/2023   Allergen Reaction Noted    Benadryl [diphenhydramine hcl]  01/21/2023    Darvocet a500 [propoxyphene n-acetaminophen]  01/21/2023       WOC Assessment Details/Treatment          01/27/23 0926   WOCN Assessment   WOCN Total Time (mins) 15   Visit Date 01/27/23   Visit Time 0926   Consult Type Follow Up   WOCN Speciality Wound   Wound pressure   Number of Wounds 1   Intervention assessed;changed   Skin Interventions   Pressure Reduction Devices pressure-redistributing mattress utilized;positioning supports utilized;heel offloading device utilized   Pressure Reduction Techniques frequent weight shift encouraged;heels elevated off bed;positioned off wounds   Positioning   Body Position side-lying   Positioning/Transfer Devices   (tortoise and pillows)        Altered Skin Integrity 01/23/23 0530 Right Coccyx #1 Purple or maroon localized area of discolored intact skin or non-intact skin or a blood-filled blister.   Date First Assessed/Time First Assessed: 01/23/23 0530   Altered Skin Integrity Present on Admission: suspected hospital acquired  Side: Right  Location: Coccyx  Wound Number: #1  Is this injury device related?: No  Description of Altered Skin  Integri...   Wound Image       Description of Altered Skin Integrity Purple or maroon localized area of discolored intact skin or non-intact skin or a blood-filled blister.   Dressing Appearance Dry;Intact;Clean   Drainage Amount None   Appearance Intact;Purple   Periwound Area Redness  (non blanchable)   Wound Length (cm) 9 cm   Wound Width (cm) 10 cm   Wound Surface Area (cm^2) 90 cm^2   Care Cleansed with:;Sterile normal saline;Applied:;Skin Barrier   Dressing Applied;Foam   Dressing Change Due 01/31/23 01/27/2023

## 2023-01-27 NOTE — PROGRESS NOTES
[facility] Medicine  Progress Note    Patient Name: Michel De La Paz  MRN: [id number]  Patient Class: IP- Inpatient   Admission Date: 1/13/2023  Length of Stay: 14 days  Attending Physician: Ac Childers MD  Primary Care Provider: WESLY Best        Subjective:     Principal Problem:Acute respiratory failure with hypoxia and hypercarbia        HPI:   68 yo F admitted overnight with worsening weakenss, sob and tachycardia. H/  H low initially and got 2 U pRBC overnight. H/H stable this and trend up.  EKG atrial fibrillation with rvr better.            Overview/Hospital Course:   68 yo F admitted overnight with worsening weakenss, sob and tachycardia. H/  H low initially and got 2 U pRBC overnight. H/H stable this and trend up.  EKG atrial fibrillation with rvr better.    01/15   HR better controlled overnight on amiodarone increased TID    01/16 c/o constipation overngiht and this AM  iron low on panel  HR stable overnight  01/17/2023 pt c/o no bm x 2 weeks.  H&H has been stable  01/18/2023 denies pain, no BM yet, H&H stable, very weak and unsteady gait, working with PT.  01/19/2023 pt had large BM today, still very weak, got SOB overnight placed on 7L oximizer due to drop in oxygen sats.  Echo EF 55-60% and CXR shows pulmonary edema.  Pt c/o feeling weak.  Surgery plans for EGD in am.  pt has had recent weight loss about 4 kng since April, was 58 kg now 54, BMI down to 20.  01/20/2023 pt more obtunded overnight this am with increased weakness and not following commands, stat ABG done showed PCO2 elevated and acute respiratory acidosis, transferred pt to ICU and placed on BIPAP, 1 hr ABG very minimal improvement and pt GSC of 6 low with minimally responsive withdraws to pain, no verbal responses  01/21/2023 pt stable on vent overnight, abg this am looks good, will start to wean, CXR no infiltrates, blood and urine cx repeated and pending.     01/22/2023 patient remained  stable on the vent were able to wean her rate a bit however her pCO2 did go up to the 80s ABG is still reasonable at 7.3  but with pCO2 very high in the 80  we put him back on AC with a rate of 16  01/23/2023 patient stable overnight her ABG actually looks better this a.m. her pCO2 has normalized after we switched her back to AC with a rate is 16 she is tolerating currently her FiO2 is at 50% respiratory rate is 20 she is still on the Levophed at 13.5 mics and getting Clinimix at 40 and propofol at 20  01/24/2023 tolerating ventilator wean overnight FiO2 50% down  01/25/2023 FiO2 down to 40% this AM -assist control  Awake and alert on vent minimal sedation  01/26/2023 stable on simv overnight but PCO2 trending up to 56.2 off AC mode  Awakening with sedation down  Cxr stable   Blood counts improved  01/27/2023 extubated this AM tolerated for short time  Developed acute respiratory distress O2 sats 60's minimally responive   Reintubated by charis          Review of Systems   All other systems reviewed and are negative.  Objective:     Vital Signs (Most Recent):  Temp: 98.1 °F (36.7 °C) (01/27/23 1101)  Pulse: 89 (01/27/23 1245)  Resp: (!) 32 (01/27/23 1238)  BP: (!) 99/56 (01/27/23 0800)  SpO2: 100 % (01/27/23 1245)   Vital Signs (24h Range):  Temp:  [98.1 °F (36.7 °C)-99.1 °F (37.3 °C)] 98.1 °F (36.7 °C)  Pulse:  [] 91  Resp:  [16-32] 32  SpO2:  [60 %-100 %] 100 %  BP: ()/(51-67) 99/56     Weight: 58.5 kg (129 lb)  Body mass index is 22.14 kg/m².    Intake/Output Summary (Last 24 hours) at 1/27/2023 1322  Last data filed at 1/27/2023 0847  Gross per 24 hour   Intake 2418.62 ml   Output 1525 ml   Net 893.62 ml      Physical Exam  Vitals and nursing note reviewed.   Constitutional:       Comments: Sedated on vent   HENT:      Head: Normocephalic and atraumatic.   Cardiovascular:      Rate and Rhythm: Normal rate.      Heart sounds: Normal heart sounds.   Pulmonary:      Effort: Pulmonary effort is normal.       Breath sounds: Normal breath sounds.      Comments: On vent  Abdominal:      General: Abdomen is flat.      Palpations: Abdomen is soft.       Significant Labs: All pertinent labs within the past 24 hours have been reviewed.    Significant Imaging: I have reviewed all pertinent imaging results/findings within the past 24 hours.      Assessment/Plan:      * Acute respiratory failure with hypoxia and hypercarbia  Patient with Hypercapnic and Hypoxic Respiratory failure which is Acute on chronic.  she is not on home oxygen. Supplemental oxygen was provided and noted- Vent Mode: SIMV  Oxygen Concentration (%):  [] 100  Resp Rate Total:  [10 br/min-24 br/min] 16 br/min  Vt Set:  [450 mL] 450 mL  PEEP/CPAP:  [5 wxA09-73 cmH20] 5 cmH20  Pressure Support:  [10 cmH20] 10 cmH20  Mean Airway Pressure:  [7.2 cmH20-9.8 cmH20] 9.8 cmH20.   Signs/symptoms of respiratory failure include- tachypnea, increased work of breathing and respiratory distress. Contributing diagnoses includes - CHF and COPD Labs and images were reviewed. Patient Has recent ABG, which has been reviewed. Will treat underlying causes and adjust management of respiratory failure as follows-     She will remain on the AC, get our case management consult to attempt for LTAC placement continue all other meds and treatment including her leave albuterol nebs IV steroids IV antibiotics follow up on cultures as they report    Continue to try and wean ventilator overnight from SIMV to spontaneous if tolerated     Failed extubation   Had to reintubate and put on ventilator     Recent weight loss  Nutrition consulted. Most recent weight and BMI monitored-  Patient will have EGD and colonoscopy once stabilized from a respiratory standpoint                        Measurements:  Wt Readings from Last 1 Encounters:   01/23/23 51.1 kg (112 lb 11.2 oz)   Body mass index is 19.34 kg/m².    Recommendations:      Patient has been screened and assessed by RD. RD will  follow patient.      Transaminitis   Improved will recheck labs CMP in a.m.      Hematuria   Recently been worked up by Urology has some small stones family reports other workup was negative including what sounds like a endoscopy or bladder scope      Microcytic anemia   Patient was scheduled for EGD and had a transfusion at the time of admission here however EGD was postponed due to acute respiratory failure with hypercarbia requiring intubation she will follow up with Dr. Abel once he is more stable  H&H remains stable will continue to monitor      Atrial fibrillation with RVR  More p.o. and recheck levels in a.m.Patient with Paroxysmal (<7 days) atrial fibrillation which is controlled currently with Amiodarone. Patient is currently in sinus rhythm.[id number] Score: 3. HASBLED Score:  . Anticoagulation not indicated due to anemia.    Her heart rate has been stable currently she is 62 and sinus    Rate controlled overnight     Severe sepsis with septic shock  This patient does have evidence of infective focus  My overall impression is septic shock. Vital signs were reviewed and noted in progress note.  Antibiotics given-   Antibiotics (From admission, onward)    Start     Stop Route Frequency Ordered    01/22/23 0400  piperacillin-tazobactam (ZOSYN) 4.5 g in dextrose 5 % in water (D5W) 5 % 100 mL IVPB (MB+)         -- IV Every 8 hours (non-standard times) 01/21/23 1521        Cultures were taken-   Microbiology Results (last 7 days)     ** No results found for the last 168 hours. **        Latest lactate reviewed, they are-  No results for input(s): LACTATE in the last 72 hours.    Organ dysfunction indicated by Acute kidney injury, Encephalopathy  and Acute respiratory failure  Source-      Source control Achieved by- iv abx and This patient does have evidence of infective focus  My overall impression is septic shock.  Source:  Sputum culture from intubation is pending shows 3 organisms growing rare amounts  will follow up  Antibiotics given-   Antibiotics (From admission, onward)    Start     Stop Route Frequency Ordered    01/22/23 0400  piperacillin-tazobactam (ZOSYN) 4.5 g in dextrose 5 % in water (D5W) 5 % 100 mL IVPB (MB+)         -- IV Every 8 hours (non-standard times) 01/21/23 1521        Latest lactate reviewed-  No results for input(s): LACTATE in the last 72 hours.  Organ dysfunction indicated by Acute kidney injury and Acute respiratory failure    Shock with decreased perfusion noted, Fluid challenge  was given at 30cc/kg and was not given at 30cc/kg due to      Post- resuscitation assessment- (Done after fluids given for shock)  Vital signs post fluid administrations were-  Temp Readings from Last 1 Encounters:   01/23/23 99.4 °F (37.4 °C)     BP Readings from Last 1 Encounters:   01/23/23 132/72     Pulse Readings from Last 1 Encounters:   01/23/23 68       Perfusion exam was performed within 6 hours of septic shock presentation after bolus shows Adequate tissue perfusion assessed by non-invasive monitoring  Will Start Pressors- Levophed for MAP of 65  Source control achieved by:  Iv abx   echo done showed EF of 60-65% with normal right  Ventricular function   CT angio chest done on admission did not show any PE and no acute finding     CT of the abdomen done on admit showed some small renal calculi no other acute finding    Patient remains on the Levophed at 13.5 mils per hour which is a 0.12 milligram/kilogram per minute dose we will continue and wean as tolerated        Hypokalemia    Her potassium is improved we will continue the p.o. replacement for now recheck labs in a.m.      VTE Risk Mitigation (From admission, onward)         Ordered     Place sequential compression device  Until discontinued         01/21/23 1326                Discharge Planning   LASHAWN:      Code Status: Full Code   Is the patient medically ready for discharge?:     Reason for patient still in hospital (select all that apply):  Patient unstable, Patient trending condition, Laboratory test, Treatment and Imaging             Critical care time = 43 minutes       Ac Childers MD  Department of Hospital Medicine   [facility]

## 2023-01-27 NOTE — CARE UPDATE
01/27/23 1533   Patient Assessment/Suction   Level of Consciousness (AVPU) responds to voice   Respiratory Effort Normal   Expansion/Accessory Muscles/Retractions no use of accessory muscles   All Lung Fields Breath Sounds coarse   Rhythm/Pattern, Respiratory assisted mechanically   Cough Frequency with stimulation   Cough Type assisted   PRE-TX-O2   Device (Oxygen Therapy) ventilator   Oxygen Concentration (%) 100   SpO2 100 %   Pulse Oximetry Type Continuous   Pulse 66   Resp 16   /61   Aerosol Therapy   $ Aerosol Therapy Charges Aerosol Treatment   Daily Review of Necessity (SVN) completed   Respiratory Treatment Status (SVN) given   Treatment Route (SVN) in-line;ventilator   Patient Position (SVN) HOB elevated   Post Treatment Assessment (SVN) breath sounds unchanged   Signs of Intolerance (SVN) none   Breath Sounds Post-Respiratory Treatment   Post-treatment Heart Rate (beats/min) 64   Post-treatment Resp Rate (breaths/min) 16        Airway - Non-Surgical 01/27/23 1230   Placement Date/Time: 01/27/23 (c) 1230   Method of Intubation: Direct laryngoscopy  Mask Ventilation: Easy  Intubated: Postinduction  Blade: Ashley #3  Airway Device Size: 7.5  Placement Verified By: Capnometry  Complicating Factors: None  Findings...   Secured at 23 cm   Measured At Lips   Secured Location Right   Secured by Commercial tube lerma   Bite Block none   Airway Safety   Ambu bag with the patient? Yes, Adult Ambu   Is mask with the patient? Yes, Adult Mask   O2  at bedside? No   Vent Select   Conventional Vent Y   Charged w/in last 24h YES   Preset Conventional Ventilator Settings   Vent Type    Ventilation Type VC   Vent Mode SIMV   Humidity HME   Set Rate 16 BPM   Vt Set 450 mL   PEEP/CPAP 5 cmH20   Pressure Support 10 cmH20   Peak Flow 60 L/min   Plateau Set/Insp. Hold (sec) 0   Insp Rise Time  70 %   I-Trigger Type  V-TRIG   Trigger Sensitivity Flow/I-Trigger 1 L/min   Patient Ventilator Parameters    Resp Rate Total 16 br/min   Peak Airway Pressure 32 cmH20   Mean Airway Pressure 10 cmH20   Plateau Pressure 0 cmH20   Exhaled Vt 492 mL   Total Ve 7.45 L/m   Spont Ve 0 L   I:E Ratio Measured 1:3.60   Auto PEEP 0 cmH20   Inspired Tidal Volume (VTI) 508 mL   Conventional Ventilator Alarms   Alarms On Y   Ve High Alarm 31.5 L/min   Ve Low Alarm 2.5 L/min   Vt High Alarm 1000 mL   Vt Low Alarm 215 mL   Resp Rate High Alarm 40 br/min   Press High Alarm 60 cmH2O   Apnea Rate 10   Apnea Volume (mL) 450 mL   Apnea Oxygen Concentration  100   Apnea Flow Rate (L/min) 60   T Apnea 20 sec(s)   Ready to Wean/Extubation Screen   FIO2<=50 (chart decimal) (!) 1   MV<16L (chart vol.) 7.45   PEEP <=8 (chart #) 5   Ready to Wean Parameters   F/VT Ratio<105 (RSBI) (!) 32.52   Negative Inspiratory Force   Negative Inspiratory Force (cm H2O) 0   Vital Capacity   Vital Capacity (mL) 0   Labs   $ Was an ABG obtained? Arterial blood Gas Benchtop;Arterial Puncture   $ Labs Tech Time 15 min   FIO2 DECREASED TO 50 AFTER ABG DONE.

## 2023-01-27 NOTE — ANESTHESIA PROCEDURE NOTES
Intubation    Date/Time: 1/27/2023 12:30 PM  Performed by: Kevin Young CRNA  Authorized by: Kevin Young CRNA     Intubation:     Induction:  Intravenous    Intubated:  Postinduction    Mask Ventilation:  Easy mask    Attempts:  1    Attempted By:  CRNA    Method of Intubation:  Direct    Blade:  Ashley 3    Laryngeal View Grade: Grade I - full view of cords      Difficult Airway Encountered?: No      Airway Device:  Oral endotracheal tube    Airway Device Size:  7.5    Style/Cuff Inflation:  Cuffed    Tube secured:  21    Secured at:  The lips    Placement Verified By:  Capnometry    Complicating Factors:  None    Findings Post-Intubation:  BS equal bilateral and atraumatic/condition of teeth unchanged

## 2023-01-27 NOTE — PT/OT/SLP PROGRESS
Physical Therapy Treatment    Patient Name:  Michel De La Paz   MRN:  [id number]    Recommendations:     Discharge Recommendations: nursing facility, skilled, rehabilitation facility, LTACH (long-term acute care hospital)  Discharge Equipment Recommendations:    Barriers to discharge: None    Assessment:     Michel De La Paz is a 67 y.o. female admitted with a medical diagnosis of Acute respiratory failure with hypoxia and hypercarbia.  She presents with the following impairments/functional limitations: weakness, impaired endurance, impaired functional mobility, impaired balance .    Rehab Prognosis: Fair; patient would benefit from acute skilled PT services to address these deficits and reach maximum level of function.    Recent Surgery: * No surgery found *      Plan:     During this hospitalization, patient to be seen daily to address the identified rehab impairments via therapeutic activities and progress toward the following goals:    Plan of Care Expires:       Subjective     Chief Complaint: generalized weakness with adls and gait.  Patient/Family Comments/goals: improved strength with bed mob and transfers  Pain/Comfort:  Pain Rating 1: 0/10      Objective:     Communicated with nsg prior to session.  Patient found supine with bed alarm, blood pressure cuff, waterman catheter, oxygen, ventilator upon PT entry to room.     General Precautions: Standard,    Orthopedic Precautions:    Braces:    Respiratory Status: Nasal cannula, flow 2 L/min     Functional Mobility:        AM-PAC 6 CLICK MOBILITY  Turning over in bed (including adjusting bedclothes, sheets and blankets)?: 1  Sitting down on and standing up from a chair with arms (e.g., wheelchair, bedside commode, etc.): 1  Moving from lying on back to sitting on the side of the bed?: 1  Moving to and from a bed to a chair (including a wheelchair)?: 1  Need to walk in hospital room?: 1  Climbing 3-5 steps with a railing?: 1  Basic Mobility Total Score:  6       Treatment & Education:  Pt performed bilateral UE/LE therex x 10 x 3 in supine with vc's for increased ROM.    Patient left supine with all lines intact, call button in reach, and bed alarm on..    GOALS:   Multidisciplinary Problems       Physical Therapy Goals          Problem: Physical Therapy    Goal Priority Disciplines Outcome Goal Variances Interventions   Physical Therapy Goal     PT, PT/OT Ongoing, Progressing     Description: Goals to be met by: discharge     Patient will increase functional independence with mobility by performin. Supine to sit with MInimal Assistance                         Time Tracking:     PT Received On: 23  PT Start Time: 715     PT Stop Time: 730  PT Total Time (min): 15 min     Billable Minutes: Therapeutic Activity 15    Treatment Type: Treatment  PT/PTA: PTA     PTA Visit Number: 2023

## 2023-01-28 LAB
ABS NEUT CALC (OHS): 6.59 X10(3)/MCL (ref 2.1–9.2)
ANION GAP SERPL CALC-SCNC: 1 MEQ/L (ref 2–13)
ANISOCYTOSIS BLD QL SMEAR: ABNORMAL
BASE EXCESS BLD CALC-SCNC: 3.8 MMOL/L
BLOOD GAS SAMPLE TYPE (OHS): ABNORMAL
BUN SERPL-MCNC: 18 MG/DL (ref 7–20)
CALCIUM SERPL-MCNC: 8.2 MG/DL (ref 8.4–10.2)
CHLORIDE SERPL-SCNC: 99 MMOL/L (ref 98–110)
CO2 SERPL-SCNC: 38 MMOL/L (ref 21–32)
CREAT SERPL-MCNC: 0.57 MG/DL (ref 0.66–1.25)
CREAT/UREA NIT SERPL: 32 (ref 12–20)
EOSINOPHIL NFR BLD MANUAL: 0.07 X10(3)/MCL (ref 0–0.9)
EOSINOPHIL NFR BLD MANUAL: 1 % (ref 0–8)
ERYTHROCYTE [DISTWIDTH] IN BLOOD BY AUTOMATED COUNT: 24.5 % (ref 11–14.5)
FIO2 BLOOD GAS (OHS): 40 %
GFR SERPLBLD CREATININE-BSD FMLA CKD-EPI: >90 MLS/MIN/1.73/M2
GLUCOSE SERPL-MCNC: 116 MG/DL (ref 70–115)
HCO3 BLDA-SCNC: 28.4 MMOL/L
HCT VFR BLD AUTO: 27.7 % (ref 36–48)
HGB BLD-MCNC: 7.8 GM/DL (ref 11.8–16)
HYPOCHROMIA BLD QL SMEAR: ABNORMAL
IMM GRANULOCYTES # BLD AUTO: 0.03 X10(3)/MCL (ref 0–0.03)
IMM GRANULOCYTES NFR BLD AUTO: 0.4 % (ref 0–0.5)
LYMPHOCYTES NFR BLD MANUAL: 0.44 X10(3)/MCL
LYMPHOCYTES NFR BLD MANUAL: 6 % (ref 13–40)
MCH RBC QN AUTO: 22 PG (ref 27–34)
MCV RBC AUTO: 78.2 FL (ref 79–99)
MEAN CELL HEMOGLOBIN CONCENTRATION (OHS) G/DL: 28.2 G/DL (ref 31–37)
MECH RR (OHS): 16 B/MIN
MECH VT (OHS): 450 ML
MICROCYTES BLD QL SMEAR: ABNORMAL
MODE (OHS): ABNORMAL
MONOCYTES NFR BLD MANUAL: 0.3 X10(3)/MCL (ref 0.1–1.3)
MONOCYTES NFR BLD MANUAL: 4 % (ref 2–11)
NEUTROPHILS NFR BLD MANUAL: 89 % (ref 47–80)
NOTIFIED (OHS): ABNORMAL
NOTIFIED BY (OHS): ABNORMAL
NOTIFIED TIME (OHS): 455
NRBC BLD AUTO-RTO: 0 % (ref 0–1)
PCO2 BLDA: 43.4 MMHG (ref 35–45)
PEEP (OHS): 5 CMH2O
PH BLDA: 7.43 [PH] (ref 7.38–7.49)
PLATELET # BLD AUTO: 346 X10(3)/MCL (ref 140–371)
PLATELET # BLD EST: ADEQUATE 10*3/UL
PMV BLD AUTO: 11 FL (ref 9.4–12.4)
PO2 BLDA: 133.3 MMHG (ref 80–100)
POCT GLUCOSE: 103 MG/DL (ref 70–110)
POCT GLUCOSE: 105 MG/DL (ref 70–110)
POCT GLUCOSE: 124 MG/DL (ref 70–110)
POIKILOCYTOSIS BLD QL SMEAR: ABNORMAL
POTASSIUM SERPL-SCNC: 3.7 MMOL/L (ref 3.5–5.1)
PS (OHS): 10 CMH2O
RBC # BLD AUTO: 3.54 X10(6)/MCL (ref 4–5.1)
RBC MORPH BLD: ABNORMAL
SAO2 % BLDA: 98.5 %
SODIUM SERPL-SCNC: 138 MMOL/L (ref 135–145)
TARGETS BLD QL SMEAR: ABNORMAL
WBC # SPEC AUTO: 7.4 X10(3)/MCL (ref 4–11.5)

## 2023-01-28 PROCEDURE — 20000000 HC ICU ROOM

## 2023-01-28 PROCEDURE — 36415 COLL VENOUS BLD VENIPUNCTURE: CPT | Performed by: FAMILY MEDICINE

## 2023-01-28 PROCEDURE — 94640 AIRWAY INHALATION TREATMENT: CPT

## 2023-01-28 PROCEDURE — 25000242 PHARM REV CODE 250 ALT 637 W/ HCPCS

## 2023-01-28 PROCEDURE — 25000003 PHARM REV CODE 250: Performed by: FAMILY MEDICINE

## 2023-01-28 PROCEDURE — 97110 THERAPEUTIC EXERCISES: CPT

## 2023-01-28 PROCEDURE — 94003 VENT MGMT INPAT SUBQ DAY: CPT

## 2023-01-28 PROCEDURE — 80048 BASIC METABOLIC PNL TOTAL CA: CPT | Performed by: FAMILY MEDICINE

## 2023-01-28 PROCEDURE — 85027 COMPLETE CBC AUTOMATED: CPT | Performed by: FAMILY MEDICINE

## 2023-01-28 PROCEDURE — 99900035 HC TECH TIME PER 15 MIN (STAT)

## 2023-01-28 PROCEDURE — 63600175 PHARM REV CODE 636 W HCPCS

## 2023-01-28 PROCEDURE — 63600175 PHARM REV CODE 636 W HCPCS: Performed by: FAMILY MEDICINE

## 2023-01-28 PROCEDURE — 36600 WITHDRAWAL OF ARTERIAL BLOOD: CPT

## 2023-01-28 PROCEDURE — 82803 BLOOD GASES ANY COMBINATION: CPT

## 2023-01-28 PROCEDURE — 27200966 HC CLOSED SUCTION SYSTEM

## 2023-01-28 PROCEDURE — 25000003 PHARM REV CODE 250

## 2023-01-28 PROCEDURE — 94761 N-INVAS EAR/PLS OXIMETRY MLT: CPT

## 2023-01-28 PROCEDURE — 99900026 HC AIRWAY MAINTENANCE (STAT)

## 2023-01-28 PROCEDURE — 27000221 HC OXYGEN, UP TO 24 HOURS

## 2023-01-28 PROCEDURE — 85025 COMPLETE CBC W/AUTO DIFF WBC: CPT | Performed by: FAMILY MEDICINE

## 2023-01-28 RX ORDER — NOREPINEPHRINE BITARTRATE 1 MG/ML
INJECTION, SOLUTION INTRAVENOUS
Status: DISPENSED
Start: 2023-01-28 | End: 2023-01-29

## 2023-01-28 RX ADMIN — LEUCINE, PHENYLALANINE, LYSINE, METHIONINE, ISOLEUCINE, VALINE, HISTIDINE, THREONINE, TRYPTOPHAN, ALANINE, GLYCINE, ARGININE, PROLINE, SERINE, TYROSINE, SODIUM ACETATE, DIBASIC POTASSIUM PHOSPHATE, MAGNESIUM CHLORIDE, SODIUM CHLORIDE, CALCIUM CHLORIDE, DEXTROSE
311; 238; 247; 170; 255; 247; 204; 179; 77; 880; 438; 489; 289; 213; 17; 297; 261; 51; 77; 33; 5 INJECTION INTRAVENOUS at 04:01

## 2023-01-28 RX ADMIN — IPRATROPIUM BROMIDE 0.5 MG: 0.5 SOLUTION RESPIRATORY (INHALATION) at 04:01

## 2023-01-28 RX ADMIN — IPRATROPIUM BROMIDE 0.5 MG: 0.5 SOLUTION RESPIRATORY (INHALATION) at 07:01

## 2023-01-28 RX ADMIN — IPRATROPIUM BROMIDE 0.5 MG: 0.5 SOLUTION RESPIRATORY (INHALATION) at 11:01

## 2023-01-28 RX ADMIN — IPRATROPIUM BROMIDE 0.5 MG: 0.5 SOLUTION RESPIRATORY (INHALATION) at 12:01

## 2023-01-28 RX ADMIN — PREGABALIN 50 MG: 25 CAPSULE ORAL at 09:01

## 2023-01-28 RX ADMIN — NOREPINEPHRINE BITARTRATE 0.06 MCG/KG/MIN: 8 INJECTION, SOLUTION INTRAVENOUS at 07:01

## 2023-01-28 RX ADMIN — IPRATROPIUM BROMIDE 0.5 MG: 0.5 SOLUTION RESPIRATORY (INHALATION) at 03:01

## 2023-01-28 RX ADMIN — PROPOFOL 15 MCG/KG/MIN: 10 INJECTION, EMULSION INTRAVENOUS at 07:01

## 2023-01-28 RX ADMIN — THEOPHYLLINE 400 MG: 400 TABLET, EXTENDED RELEASE ORAL at 09:01

## 2023-01-28 RX ADMIN — OXYBUTYNIN CHLORIDE 5 MG: 5 TABLET, EXTENDED RELEASE ORAL at 09:01

## 2023-01-28 RX ADMIN — POLYETHYLENE GLYCOL 3350 17 G: 17 POWDER, FOR SOLUTION ORAL at 09:01

## 2023-01-28 RX ADMIN — METFORMIN HYDROCHLORIDE 500 MG: 500 TABLET, FILM COATED ORAL at 05:01

## 2023-01-28 RX ADMIN — PREGABALIN 50 MG: 25 CAPSULE ORAL at 03:01

## 2023-01-28 RX ADMIN — FERROUS SULFATE TAB 325 MG (65 MG ELEMENTAL FE) 1 EACH: 325 (65 FE) TAB at 03:01

## 2023-01-28 RX ADMIN — LEVALBUTEROL HYDROCHLORIDE 1.25 MG: 1.25 SOLUTION, CONCENTRATE RESPIRATORY (INHALATION) at 12:01

## 2023-01-28 RX ADMIN — OXYCODONE HYDROCHLORIDE AND ACETAMINOPHEN 500 MG: 500 TABLET ORAL at 09:01

## 2023-01-28 RX ADMIN — BUDESONIDE INHALATION 0.5 MG: 0.5 SUSPENSION RESPIRATORY (INHALATION) at 07:01

## 2023-01-28 RX ADMIN — LEVALBUTEROL HYDROCHLORIDE 1.25 MG: 1.25 SOLUTION, CONCENTRATE RESPIRATORY (INHALATION) at 04:01

## 2023-01-28 RX ADMIN — LEVALBUTEROL HYDROCHLORIDE 1.25 MG: 1.25 SOLUTION, CONCENTRATE RESPIRATORY (INHALATION) at 11:01

## 2023-01-28 RX ADMIN — FERROUS SULFATE TAB 325 MG (65 MG ELEMENTAL FE) 1 EACH: 325 (65 FE) TAB at 08:01

## 2023-01-28 RX ADMIN — LEVALBUTEROL HYDROCHLORIDE 1.25 MG: 1.25 SOLUTION, CONCENTRATE RESPIRATORY (INHALATION) at 07:01

## 2023-01-28 RX ADMIN — FERROUS SULFATE TAB 325 MG (65 MG ELEMENTAL FE) 1 EACH: 325 (65 FE) TAB at 09:01

## 2023-01-28 RX ADMIN — ATORVASTATIN CALCIUM 80 MG: 40 TABLET, FILM COATED ORAL at 09:01

## 2023-01-28 RX ADMIN — METFORMIN HYDROCHLORIDE 500 MG: 500 TABLET, FILM COATED ORAL at 09:01

## 2023-01-28 RX ADMIN — FAMOTIDINE 20 MG: 10 INJECTION INTRAVENOUS at 09:01

## 2023-01-28 RX ADMIN — FUROSEMIDE 20 MG: 20 TABLET ORAL at 09:01

## 2023-01-28 RX ADMIN — FAMOTIDINE 20 MG: 10 INJECTION INTRAVENOUS at 08:01

## 2023-01-28 RX ADMIN — LEVALBUTEROL HYDROCHLORIDE 1.25 MG: 1.25 SOLUTION, CONCENTRATE RESPIRATORY (INHALATION) at 03:01

## 2023-01-28 RX ADMIN — FLUTICASONE PROPIONATE 50 MCG: 50 SPRAY, METERED NASAL at 09:01

## 2023-01-28 RX ADMIN — IRON SUCROSE 100 MG: 20 INJECTION, SOLUTION INTRAVENOUS at 01:01

## 2023-01-28 RX ADMIN — OXYCODONE HYDROCHLORIDE AND ACETAMINOPHEN 500 MG: 500 TABLET ORAL at 08:01

## 2023-01-28 RX ADMIN — PREGABALIN 50 MG: 25 CAPSULE ORAL at 08:01

## 2023-01-28 NOTE — ASSESSMENT & PLAN NOTE
Patient with Hypercapnic and Hypoxic Respiratory failure which is Acute on chronic.  she is not on home oxygen. Supplemental oxygen was provided and noted- Vent Mode: SIMV  Oxygen Concentration (%):  [] 30  Resp Rate Total:  [16 br/min-27 br/min] 25 br/min  Vt Set:  [450 mL] 450 mL  PEEP/CPAP:  [5 cmH20] 5 cmH20  Pressure Support:  [10 cmH20] 10 cmH20  Mean Airway Pressure:  [7 ybH72-97 cmH20] 11 cmH20.   Signs/symptoms of respiratory failure include- tachypnea, increased work of breathing and respiratory distress. Contributing diagnoses includes - CHF and COPD Labs and images were reviewed. Patient Has recent ABG, which has been reviewed. Will treat underlying causes and adjust management of respiratory failure as follows-     She will remain on the AC, get our case management consult to attempt for LTAC placement continue all other meds and treatment including her leave albuterol nebs IV steroids IV antibiotics follow up on cultures as they report    Continue to try and wean ventilator overnight from SIMV to spontaneous if tolerated     Failed extubation   Had to reintubate and put on ventilator   Stable on SIMV

## 2023-01-28 NOTE — SUBJECTIVE & OBJECTIVE
Review of Systems   All other systems reviewed and are negative.  Objective:     Vital Signs (Most Recent):  Temp: 98.1 °F (36.7 °C) (01/28/23 1101)  Pulse: 70 (01/28/23 1418)  Resp: 20 (01/28/23 1216)  BP: 106/67 (01/28/23 1330)  SpO2: (!) 91 % (01/28/23 1418)   Vital Signs (24h Range):  Temp:  [97.2 °F (36.2 °C)-98.5 °F (36.9 °C)] 98.1 °F (36.7 °C)  Pulse:  [57-75] 70  Resp:  [16-22] 20  SpO2:  [91 %-100 %] 91 %  BP: ()/(51-75) 106/67     Weight: 55.1 kg (121 lb 8 oz)  Body mass index is 20.86 kg/m².    Intake/Output Summary (Last 24 hours) at 1/28/2023 1509  Last data filed at 1/28/2023 0500  Gross per 24 hour   Intake 2181 ml   Output 701 ml   Net 1480 ml      Physical Exam  Vitals and nursing note reviewed.   Constitutional:       Comments: Sedated on vent   HENT:      Head: Normocephalic and atraumatic.   Cardiovascular:      Rate and Rhythm: Normal rate.      Heart sounds: Normal heart sounds.   Pulmonary:      Effort: Pulmonary effort is normal.      Breath sounds: Normal breath sounds.      Comments: On vent  Abdominal:      General: Abdomen is flat.      Palpations: Abdomen is soft.       Significant Labs: All pertinent labs within the past 24 hours have been reviewed.    Significant Imaging: I have reviewed all pertinent imaging results/findings within the past 24 hours.

## 2023-01-28 NOTE — PT/OT/SLP PROGRESS
Physical Therapy  Treatment    Michel De La Paz   MRN: [id number]   Admitting Diagnosis: Acute respiratory failure with hypoxia and hypercarbia                          Billable Minutes:  Therapeutic Exercise 15             PTA Visit Number: 5       General Precautions: Standard,    Orthopedic Precautions:    Braces:    Respiratory Status:  Vent    Spiritual, Cultural Beliefs, Lutheran Practices, Values that Affect Care: no    Subjective:  Communicated with nursing prior to session.  Attempted to remove vent yesterday with labored breathing and had to return to vent.          Objective:    AAROM completed in all extremities.     Functional Mobility:  Bed Mobility:        Transfers:       Gait:        Stairs:          Balance:   Static Sit:   Dynamic Sit:   Static Stand:   Dynamic stand:        Treatment and Education:  ROM completed in all extremities. More assistance needed during UE movements, but no expression of pain.      AM-PAC 6 CLICK MOBILITY  How much help from another person does this patient currently need?   1 = Unable, Total/Dependent Assistance  2 = A lot, Maximum/Moderate Assistance  3 = A little, Minimum/Contact Guard/Supervision  4 = None, Modified [place]/Independent         AM-PAC Raw Score CMS G-Code Modifier Level of Impairment Assistance   6 % Total / Unable   7 - 9 CM 80 - 100% Maximal Assist   10 - 14 CL 60 - 80% Moderate Assist   15 - 19 CK 40 - 60% Moderate Assist   20 - 22 CJ 20 - 40% Minimal Assist   23 CI 1-20% SBA / CGA   24 CH 0% Independent/ Mod I     Patient left HOB elevated with all lines intact.    Assessment:  Michel De La Paz is a 67 y.o. female with a medical diagnosis of Acute respiratory failure with hypoxia and hypercarbia and presents with decreased strength and mobility.  Treatment limited to bed ROM due to lines and vent placement.     Rehab identified problem list/impairments: weakness, impaired endurance, impaired functional mobility, impaired  balance    Rehab potential is fair.    Activity tolerance: Good    Discharge recommendations: nursing facility, skilled, rehabilitation facility, LTACH (long-term acute care hospital)      Barriers to discharge:      Equipment recommendations:       GOALS:   Multidisciplinary Problems       Physical Therapy Goals          Problem: Physical Therapy    Goal Priority Disciplines Outcome Goal Variances Interventions   Physical Therapy Goal     PT, PT/OT Ongoing, Progressing     Description: Goals to be met by: discharge     Patient will increase functional independence with mobility by performin. Supine to sit with MInimal Assistance                         PLAN:    Patient to be seen daily to address the above listed problems via therapeutic activities  Plan of Care expires:    Plan of Care reviewed with: patient         2023

## 2023-01-28 NOTE — PROGRESS NOTES
[facility] Medicine  Progress Note    Patient Name: Michel De La Paz  MRN: [id number]  Patient Class: IP- Inpatient   Admission Date: 1/13/2023  Length of Stay: 15 days  Attending Physician: Ac Childers MD  Primary Care Provider: WESLY Best        Subjective:     Principal Problem:Acute respiratory failure with hypoxia and hypercarbia        HPI:   68 yo F admitted overnight with worsening weakenss, sob and tachycardia. H/  H low initially and got 2 U pRBC overnight. H/H stable this and trend up.  EKG atrial fibrillation with rvr better.            Overview/Hospital Course:   68 yo F admitted overnight with worsening weakenss, sob and tachycardia. H/  H low initially and got 2 U pRBC overnight. H/H stable this and trend up.  EKG atrial fibrillation with rvr better.    01/15   HR better controlled overnight on amiodarone increased TID    01/16 c/o constipation overngiht and this AM  iron low on panel  HR stable overnight  01/17/2023 pt c/o no bm x 2 weeks.  H&H has been stable  01/18/2023 denies pain, no BM yet, H&H stable, very weak and unsteady gait, working with PT.  01/19/2023 pt had large BM today, still very weak, got SOB overnight placed on 7L oximizer due to drop in oxygen sats.  Echo EF 55-60% and CXR shows pulmonary edema.  Pt c/o feeling weak.  Surgery plans for EGD in am.  pt has had recent weight loss about 4 kng since April, was 58 kg now 54, BMI down to 20.  01/20/2023 pt more obtunded overnight this am with increased weakness and not following commands, stat ABG done showed PCO2 elevated and acute respiratory acidosis, transferred pt to ICU and placed on BIPAP, 1 hr ABG very minimal improvement and pt GSC of 6 low with minimally responsive withdraws to pain, no verbal responses  01/21/2023 pt stable on vent overnight, abg this am looks good, will start to wean, CXR no infiltrates, blood and urine cx repeated and pending.     01/22/2023 patient remained  stable on the vent were able to wean her rate a bit however her pCO2 did go up to the 80s ABG is still reasonable at 7.3  but with pCO2 very high in the 80  we put him back on AC with a rate of 16  01/23/2023 patient stable overnight her ABG actually looks better this a.m. her pCO2 has normalized after we switched her back to AC with a rate is 16 she is tolerating currently her FiO2 is at 50% respiratory rate is 20 she is still on the Levophed at 13.5 mics and getting Clinimix at 40 and propofol at 20  01/24/2023 tolerating ventilator wean overnight FiO2 50% down  01/25/2023 FiO2 down to 40% this AM -assist control  Awake and alert on vent minimal sedation  01/26/2023 stable on simv overnight but PCO2 trending up to 56.2 off AC mode  Awakening with sedation down  Cxr stable   Blood counts improved  01/27/2023 extubated this AM tolerated for short time  Developed acute respiratory distress O2 sats 60's minimally responive   Reintubated by sheesia  01/28/2023 abg stable on simv this Am   AWAKE AND ALERT  No more cough  Pain better   Tube feeds for the rec's talked to ivelisse will start slow  Not tolerated 1st time again          Review of Systems   All other systems reviewed and are negative.  Objective:     Vital Signs (Most Recent):  Temp: 98.1 °F (36.7 °C) (01/28/23 1101)  Pulse: 70 (01/28/23 1418)  Resp: 20 (01/28/23 1216)  BP: 106/67 (01/28/23 1330)  SpO2: (!) 91 % (01/28/23 1418)   Vital Signs (24h Range):  Temp:  [97.2 °F (36.2 °C)-98.5 °F (36.9 °C)] 98.1 °F (36.7 °C)  Pulse:  [57-75] 70  Resp:  [16-22] 20  SpO2:  [91 %-100 %] 91 %  BP: ()/(51-75) 106/67     Weight: 55.1 kg (121 lb 8 oz)  Body mass index is 20.86 kg/m².    Intake/Output Summary (Last 24 hours) at 1/28/2023 1509  Last data filed at 1/28/2023 0500  Gross per 24 hour   Intake 2181 ml   Output 701 ml   Net 1480 ml      Physical Exam  Vitals and nursing note reviewed.   Constitutional:       Comments: Sedated on vent   HENT:      Head:  Normocephalic and atraumatic.   Cardiovascular:      Rate and Rhythm: Normal rate.      Heart sounds: Normal heart sounds.   Pulmonary:      Effort: Pulmonary effort is normal.      Breath sounds: Normal breath sounds.      Comments: On vent  Abdominal:      General: Abdomen is flat.      Palpations: Abdomen is soft.       Significant Labs: All pertinent labs within the past 24 hours have been reviewed.    Significant Imaging: I have reviewed all pertinent imaging results/findings within the past 24 hours.      Assessment/Plan:      * Acute respiratory failure with hypoxia and hypercarbia  Patient with Hypercapnic and Hypoxic Respiratory failure which is Acute on chronic.  she is not on home oxygen. Supplemental oxygen was provided and noted- Vent Mode: SIMV  Oxygen Concentration (%):  [] 30  Resp Rate Total:  [16 br/min-27 br/min] 25 br/min  Vt Set:  [450 mL] 450 mL  PEEP/CPAP:  [5 cmH20] 5 cmH20  Pressure Support:  [10 cmH20] 10 cmH20  Mean Airway Pressure:  [7 etO75-94 cmH20] 11 cmH20.   Signs/symptoms of respiratory failure include- tachypnea, increased work of breathing and respiratory distress. Contributing diagnoses includes - CHF and COPD Labs and images were reviewed. Patient Has recent ABG, which has been reviewed. Will treat underlying causes and adjust management of respiratory failure as follows-     She will remain on the AC, get our case management consult to attempt for LTAC placement continue all other meds and treatment including her leave albuterol nebs IV steroids IV antibiotics follow up on cultures as they report    Continue to try and wean ventilator overnight from SIMV to spontaneous if tolerated     Failed extubation   Had to reintubate and put on ventilator   Stable on SIMV    Recent weight loss  Nutrition consulted. Most recent weight and BMI monitored-  Patient will have EGD and colonoscopy once stabilized from a respiratory standpoint                        Measurements:  Wt  Readings from Last 1 Encounters:   01/23/23 51.1 kg (112 lb 11.2 oz)   Body mass index is 19.34 kg/m².    Recommendations:      Patient has been screened and assessed by RD. RD will follow patient.      Transaminitis   Improved will recheck labs CMP in a.m.      Hematuria   Recently been worked up by Urology has some small stones family reports other workup was negative including what sounds like a endoscopy or bladder scope      Microcytic anemia   Patient was scheduled for EGD and had a transfusion at the time of admission here however EGD was postponed due to acute respiratory failure with hypercarbia requiring intubation she will follow up with Dr. Abel once he is more stable  H&H remains stable will continue to monitor      Atrial fibrillation with RVR  More p.o. and recheck levels in a.m.Patient with Paroxysmal (<7 days) atrial fibrillation which is controlled currently with Amiodarone. Patient is currently in sinus rhythm.[id number] Score: 3. HASBLED Score:  . Anticoagulation not indicated due to anemia.    Her heart rate has been stable currently she is 62 and sinus    Rate controlled overnight     Severe sepsis with septic shock  This patient does have evidence of infective focus  My overall impression is septic shock. Vital signs were reviewed and noted in progress note.  Antibiotics given-   Antibiotics (From admission, onward)    Start     Stop Route Frequency Ordered    01/22/23 0400  piperacillin-tazobactam (ZOSYN) 4.5 g in dextrose 5 % in water (D5W) 5 % 100 mL IVPB (MB+)         -- IV Every 8 hours (non-standard times) 01/21/23 1521        Cultures were taken-   Microbiology Results (last 7 days)     ** No results found for the last 168 hours. **        Latest lactate reviewed, they are-  No results for input(s): LACTATE in the last 72 hours.    Organ dysfunction indicated by Acute kidney injury, Encephalopathy  and Acute respiratory failure  Source-      Source control Achieved by- iv abx and  This patient does have evidence of infective focus  My overall impression is septic shock.  Source:  Sputum culture from intubation is pending shows 3 organisms growing rare amounts will follow up  Antibiotics given-   Antibiotics (From admission, onward)    Start     Stop Route Frequency Ordered    01/22/23 0400  piperacillin-tazobactam (ZOSYN) 4.5 g in dextrose 5 % in water (D5W) 5 % 100 mL IVPB (MB+)         -- IV Every 8 hours (non-standard times) 01/21/23 1521        Latest lactate reviewed-  No results for input(s): LACTATE in the last 72 hours.  Organ dysfunction indicated by Acute kidney injury and Acute respiratory failure    Shock with decreased perfusion noted, Fluid challenge  was given at 30cc/kg and was not given at 30cc/kg due to      Post- resuscitation assessment- (Done after fluids given for shock)  Vital signs post fluid administrations were-  Temp Readings from Last 1 Encounters:   01/23/23 99.4 °F (37.4 °C)     BP Readings from Last 1 Encounters:   01/23/23 132/72     Pulse Readings from Last 1 Encounters:   01/23/23 68       Perfusion exam was performed within 6 hours of septic shock presentation after bolus shows Adequate tissue perfusion assessed by non-invasive monitoring  Will Start Pressors- Levophed for MAP of 65  Source control achieved by:  Iv abx   echo done showed EF of 60-65% with normal right  Ventricular function   CT angio chest done on admission did not show any PE and no acute finding     CT of the abdomen done on admit showed some small renal calculi no other acute finding    Patient remains on the Levophed at 13.5 mils per hour which is a 0.12 milligram/kilogram per minute dose we will continue and wean as tolerated        Hypokalemia    Her potassium is improved we will continue the p.o. replacement for now recheck labs in a.m.      VTE Risk Mitigation (From admission, onward)         Ordered     Place sequential compression device  Until discontinued         01/21/23 6943                 Discharge Planning   LASHAWN:      Code Status: Full Code   Is the patient medically ready for discharge?:     Reason for patient still in hospital (select all that apply): Patient trending condition, Laboratory test, Treatment and Imaging           Critical care time 39 minutes             Ac Childers MD  Department of Hospital Medicine   [facility]

## 2023-01-29 LAB
ABS NEUT CALC (OHS): 5.81 X10(3)/MCL (ref 2.1–9.2)
ANION GAP SERPL CALC-SCNC: 2 MEQ/L (ref 2–13)
ANISOCYTOSIS BLD QL SMEAR: ABNORMAL
BASE EXCESS BLD CALC-SCNC: 9.3 MMOL/L
BLOOD GAS SAMPLE TYPE (OHS): ABNORMAL
BUN SERPL-MCNC: 16 MG/DL (ref 7–20)
CALCIUM SERPL-MCNC: 8.5 MG/DL (ref 8.4–10.2)
CHLORIDE SERPL-SCNC: 98 MMOL/L (ref 98–110)
CO2 SERPL-SCNC: 37 MMOL/L (ref 21–32)
CREAT SERPL-MCNC: 0.57 MG/DL (ref 0.66–1.25)
CREAT/UREA NIT SERPL: 28 (ref 12–20)
ELLIPTOCYTOSIS (OHS): ABNORMAL
EOSINOPHIL NFR BLD MANUAL: 0.07 X10(3)/MCL (ref 0–0.9)
EOSINOPHIL NFR BLD MANUAL: 1 % (ref 0–8)
ERYTHROCYTE [DISTWIDTH] IN BLOOD BY AUTOMATED COUNT: 25.1 % (ref 11–14.5)
FIO2 BLOOD GAS (OHS): 30 %
GFR SERPLBLD CREATININE-BSD FMLA CKD-EPI: >90 MLS/MIN/1.73/M2
GLUCOSE SERPL-MCNC: 111 MG/DL (ref 70–115)
HCO3 BLDA-SCNC: 35 MMOL/L
HCT VFR BLD AUTO: 28.3 % (ref 36–48)
HGB BLD-MCNC: 8.2 GM/DL (ref 11.8–16)
HYPOCHROMIA BLD QL SMEAR: ABNORMAL
IMM GRANULOCYTES # BLD AUTO: 0.02 X10(3)/MCL (ref 0–0.03)
IMM GRANULOCYTES NFR BLD AUTO: 0.3 % (ref 0–0.5)
LYMPHOCYTES NFR BLD MANUAL: 0.91 X10(3)/MCL
LYMPHOCYTES NFR BLD MANUAL: 13 % (ref 13–40)
MCH RBC QN AUTO: 22.4 PG (ref 27–34)
MCV RBC AUTO: 77.3 FL (ref 79–99)
MEAN CELL HEMOGLOBIN CONCENTRATION (OHS) G/DL: 29 G/DL (ref 31–37)
MECH RR (OHS): 16 B/MIN
MECH VT (OHS): 450 ML
MICROCYTES BLD QL SMEAR: ABNORMAL
MODE (OHS): ABNORMAL
MONOCYTES NFR BLD MANUAL: 0.21 X10(3)/MCL (ref 0.1–1.3)
MONOCYTES NFR BLD MANUAL: 3 % (ref 2–11)
NEUTROPHILS NFR BLD MANUAL: 83 % (ref 47–80)
NOTIFIED (OHS): ABNORMAL
NOTIFIED BY (OHS): ABNORMAL
NOTIFIED TIME (OHS): 425
NRBC BLD AUTO-RTO: 0 % (ref 0–1)
PCO2 BLDA: 54.7 MMHG (ref 35–45)
PEEP (OHS): 5 CMH2O
PH BLDA: 7.42 [PH] (ref 7.38–7.49)
PLATELET # BLD AUTO: 348 X10(3)/MCL (ref 140–371)
PLATELET # BLD EST: NORMAL 10*3/UL
PMV BLD AUTO: 11.1 FL (ref 9.4–12.4)
PO2 BLDA: 76.6 MMHG (ref 80–100)
POCT GLUCOSE: 110 MG/DL (ref 70–110)
POCT GLUCOSE: 111 MG/DL (ref 70–110)
POCT GLUCOSE: 123 MG/DL (ref 70–110)
POIKILOCYTOSIS BLD QL SMEAR: ABNORMAL
POTASSIUM SERPL-SCNC: 3.6 MMOL/L (ref 3.5–5.1)
PS (OHS): 10 CMH2O
RBC # BLD AUTO: 3.66 X10(6)/MCL (ref 4–5.1)
RBC MORPH BLD: ABNORMAL
SAO2 % BLDA: 94.8 %
SODIUM SERPL-SCNC: 137 MMOL/L (ref 135–145)
TARGETS BLD QL SMEAR: ABNORMAL
WBC # SPEC AUTO: 7 X10(3)/MCL (ref 4–11.5)

## 2023-01-29 PROCEDURE — 94761 N-INVAS EAR/PLS OXIMETRY MLT: CPT

## 2023-01-29 PROCEDURE — 99900026 HC AIRWAY MAINTENANCE (STAT)

## 2023-01-29 PROCEDURE — 25000242 PHARM REV CODE 250 ALT 637 W/ HCPCS

## 2023-01-29 PROCEDURE — 97110 THERAPEUTIC EXERCISES: CPT

## 2023-01-29 PROCEDURE — 20000000 HC ICU ROOM

## 2023-01-29 PROCEDURE — 25000003 PHARM REV CODE 250: Performed by: FAMILY MEDICINE

## 2023-01-29 PROCEDURE — 99900035 HC TECH TIME PER 15 MIN (STAT)

## 2023-01-29 PROCEDURE — 82803 BLOOD GASES ANY COMBINATION: CPT

## 2023-01-29 PROCEDURE — 63600175 PHARM REV CODE 636 W HCPCS

## 2023-01-29 PROCEDURE — 63600175 PHARM REV CODE 636 W HCPCS: Performed by: FAMILY MEDICINE

## 2023-01-29 PROCEDURE — 94003 VENT MGMT INPAT SUBQ DAY: CPT

## 2023-01-29 PROCEDURE — 36415 COLL VENOUS BLD VENIPUNCTURE: CPT | Performed by: FAMILY MEDICINE

## 2023-01-29 PROCEDURE — 85027 COMPLETE CBC AUTOMATED: CPT | Performed by: FAMILY MEDICINE

## 2023-01-29 PROCEDURE — 27000221 HC OXYGEN, UP TO 24 HOURS

## 2023-01-29 PROCEDURE — 36600 WITHDRAWAL OF ARTERIAL BLOOD: CPT

## 2023-01-29 PROCEDURE — 94640 AIRWAY INHALATION TREATMENT: CPT

## 2023-01-29 PROCEDURE — 25000003 PHARM REV CODE 250

## 2023-01-29 PROCEDURE — 25000242 PHARM REV CODE 250 ALT 637 W/ HCPCS: Performed by: FAMILY MEDICINE

## 2023-01-29 PROCEDURE — 80048 BASIC METABOLIC PNL TOTAL CA: CPT | Performed by: FAMILY MEDICINE

## 2023-01-29 RX ADMIN — RACEPINEPHRINE HYDROCHLORIDE 0.5 ML: 11.25 SOLUTION RESPIRATORY (INHALATION) at 07:01

## 2023-01-29 RX ADMIN — BUDESONIDE INHALATION 0.5 MG: 0.5 SUSPENSION RESPIRATORY (INHALATION) at 07:01

## 2023-01-29 RX ADMIN — FERROUS SULFATE TAB 325 MG (65 MG ELEMENTAL FE) 1 EACH: 325 (65 FE) TAB at 09:01

## 2023-01-29 RX ADMIN — IPRATROPIUM BROMIDE 0.5 MG: 0.5 SOLUTION RESPIRATORY (INHALATION) at 07:01

## 2023-01-29 RX ADMIN — OXYCODONE HYDROCHLORIDE AND ACETAMINOPHEN 500 MG: 500 TABLET ORAL at 09:01

## 2023-01-29 RX ADMIN — FLUTICASONE PROPIONATE 50 MCG: 50 SPRAY, METERED NASAL at 09:01

## 2023-01-29 RX ADMIN — LEVALBUTEROL HYDROCHLORIDE 1.25 MG: 1.25 SOLUTION, CONCENTRATE RESPIRATORY (INHALATION) at 07:01

## 2023-01-29 RX ADMIN — FUROSEMIDE 20 MG: 20 TABLET ORAL at 08:01

## 2023-01-29 RX ADMIN — IPRATROPIUM BROMIDE 0.5 MG: 0.5 SOLUTION RESPIRATORY (INHALATION) at 11:01

## 2023-01-29 RX ADMIN — METFORMIN HYDROCHLORIDE 500 MG: 500 TABLET, FILM COATED ORAL at 08:01

## 2023-01-29 RX ADMIN — METFORMIN HYDROCHLORIDE 500 MG: 500 TABLET, FILM COATED ORAL at 05:01

## 2023-01-29 RX ADMIN — PREGABALIN 50 MG: 25 CAPSULE ORAL at 09:01

## 2023-01-29 RX ADMIN — PROPOFOL 15 MCG/KG/MIN: 10 INJECTION, EMULSION INTRAVENOUS at 06:01

## 2023-01-29 RX ADMIN — THEOPHYLLINE 400 MG: 400 TABLET, EXTENDED RELEASE ORAL at 08:01

## 2023-01-29 RX ADMIN — OXYBUTYNIN CHLORIDE 5 MG: 5 TABLET, EXTENDED RELEASE ORAL at 08:01

## 2023-01-29 RX ADMIN — ATORVASTATIN CALCIUM 80 MG: 40 TABLET, FILM COATED ORAL at 08:01

## 2023-01-29 RX ADMIN — IPRATROPIUM BROMIDE 0.5 MG: 0.5 SOLUTION RESPIRATORY (INHALATION) at 03:01

## 2023-01-29 RX ADMIN — LEUCINE, PHENYLALANINE, LYSINE, METHIONINE, ISOLEUCINE, VALINE, HISTIDINE, THREONINE, TRYPTOPHAN, ALANINE, GLYCINE, ARGININE, PROLINE, SERINE, TYROSINE, SODIUM ACETATE, DIBASIC POTASSIUM PHOSPHATE, MAGNESIUM CHLORIDE, SODIUM CHLORIDE, CALCIUM CHLORIDE, DEXTROSE
311; 238; 247; 170; 255; 247; 204; 179; 77; 880; 438; 489; 289; 213; 17; 297; 261; 51; 77; 33; 5 INJECTION INTRAVENOUS at 03:01

## 2023-01-29 RX ADMIN — IRON SUCROSE 100 MG: 20 INJECTION, SOLUTION INTRAVENOUS at 08:01

## 2023-01-29 RX ADMIN — AMIODARONE HYDROCHLORIDE 400 MG: 200 TABLET ORAL at 08:01

## 2023-01-29 RX ADMIN — PREGABALIN 50 MG: 25 CAPSULE ORAL at 08:01

## 2023-01-29 RX ADMIN — FERROUS SULFATE TAB 325 MG (65 MG ELEMENTAL FE) 1 EACH: 325 (65 FE) TAB at 08:01

## 2023-01-29 RX ADMIN — LEVALBUTEROL HYDROCHLORIDE 1.25 MG: 1.25 SOLUTION, CONCENTRATE RESPIRATORY (INHALATION) at 03:01

## 2023-01-29 RX ADMIN — OXYCODONE HYDROCHLORIDE AND ACETAMINOPHEN 500 MG: 500 TABLET ORAL at 08:01

## 2023-01-29 RX ADMIN — FERROUS SULFATE TAB 325 MG (65 MG ELEMENTAL FE) 1 EACH: 325 (65 FE) TAB at 02:01

## 2023-01-29 RX ADMIN — PREGABALIN 50 MG: 25 CAPSULE ORAL at 02:01

## 2023-01-29 RX ADMIN — FAMOTIDINE 20 MG: 10 INJECTION INTRAVENOUS at 09:01

## 2023-01-29 RX ADMIN — LEVALBUTEROL HYDROCHLORIDE 1.25 MG: 1.25 SOLUTION, CONCENTRATE RESPIRATORY (INHALATION) at 11:01

## 2023-01-29 RX ADMIN — FAMOTIDINE 20 MG: 10 INJECTION INTRAVENOUS at 08:01

## 2023-01-29 NOTE — PLAN OF CARE
Reviewed POC with patient and family, reviewed meds, vent adjustments and plan to reconsult for LTAC on Monday. Questions answered

## 2023-01-29 NOTE — SUBJECTIVE & OBJECTIVE
Review of Systems   All other systems reviewed and are negative.  Objective:     Vital Signs (Most Recent):  Temp: 98.1 °F (36.7 °C) (01/29/23 1101)  Pulse: 63 (01/29/23 1419)  Resp: (!) 21 (01/29/23 1139)  BP: 117/66 (01/29/23 0845)  SpO2: (!) 93 % (01/29/23 1419)   Vital Signs (24h Range):  Temp:  [98 °F (36.7 °C)-99.1 °F (37.3 °C)] 98.1 °F (36.7 °C)  Pulse:  [63-79] 63  Resp:  [16-22] 21  SpO2:  [90 %-100 %] 93 %  BP: ()/(53-67) 117/66     Weight: 57.7 kg (127 lb 3.2 oz)  Body mass index is 21.83 kg/m².    Intake/Output Summary (Last 24 hours) at 1/29/2023 1422  Last data filed at 1/29/2023 0500  Gross per 24 hour   Intake 1089 ml   Output 1600 ml   Net -511 ml      Physical Exam  Vitals and nursing note reviewed.   Constitutional:       Comments: Sedated on vent   HENT:      Head: Normocephalic and atraumatic.   Cardiovascular:      Rate and Rhythm: Normal rate.      Heart sounds: Normal heart sounds.   Pulmonary:      Effort: Pulmonary effort is normal.      Breath sounds: Normal breath sounds.      Comments: On vent  Abdominal:      General: Abdomen is flat.      Palpations: Abdomen is soft.       Significant Labs: All pertinent labs within the past 24 hours have been reviewed.    Significant Imaging: I have reviewed all pertinent imaging results/findings within the past 24 hours.

## 2023-01-29 NOTE — PT/OT/SLP PROGRESS
Physical Therapy  Treatment    Michel De La Paz   MRN: [id number]   Admitting Diagnosis: Acute respiratory failure with hypoxia and hypercarbia    PT Received On: 01/29/23  PT Start Time: 1111     PT Stop Time: 1126    PT Total Time (min): 15 min       Billable Minutes:  Therapeutic Exercise 15    Treatment Type: Treatment  PT/PTA: PT     PTA Visit Number: 0       General Precautions: Standard,    Orthopedic Precautions:    Braces:    Respiratory Status:  Vent    Spiritual, Cultural Beliefs, Baptist Practices, Values that Affect Care: no    Subjective:  Communicated with Nursing prior to session.  Stated patient has had no changes and is OK to exercise in the bed.         Objective:   Patient found with: ventilator    Functional Mobility:  Bed Mobility:        Transfers:       Gait:        Stairs:          Balance:   Static Sit:   Dynamic Sit:   Static Stand:   Dynamic stand:        Treatment and Education:  LE and UE ROM completed 2x15 bilaterally.  Guidance and Min A needed with some exercises more for correct motion, than strength.      AM-PAC 6 CLICK MOBILITY  How much help from another person does this patient currently need?   1 = Unable, Total/Dependent Assistance  2 = A lot, Maximum/Moderate Assistance  3 = A little, Minimum/Contact Guard/Supervision  4 = None, Modified [place]/Independent         AM-PAC Raw Score CMS G-Code Modifier Level of Impairment Assistance   6 % Total / Unable   7 - 9 CM 80 - 100% Maximal Assist   10 - 14 CL 60 - 80% Moderate Assist   15 - 19 CK 40 - 60% Moderate Assist   20 - 22 CJ 20 - 40% Minimal Assist   23 CI 1-20% SBA / CGA   24 CH 0% Independent/ Mod I     Patient left right sidelying with  nursing notified.    Assessment:  Michel De La Paz is a 67 y.o. female with a medical diagnosis of Acute respiratory failure with hypoxia and hypercarbia and presents with decreased strength and endurance. Mild improvements in ROM noted today with no negative effects.    Rehab  identified problem list/impairments: weakness, impaired endurance, impaired functional mobility, impaired balance    Rehab potential is good.    Activity tolerance: Good    Discharge recommendations: nursing facility, skilled, rehabilitation facility, LTACH (long-term acute care hospital)      Barriers to discharge:      Equipment recommendations:       GOALS:   Multidisciplinary Problems       Physical Therapy Goals          Problem: Physical Therapy    Goal Priority Disciplines Outcome Goal Variances Interventions   Physical Therapy Goal     PT, PT/OT Ongoing, Progressing     Description: Goals to be met by: discharge     Patient will increase functional independence with mobility by performin. Supine to sit with MInimal Assistance                         PLAN:    Patient to be seen daily to address the above listed problems via therapeutic exercises  Plan of Care expires:    Plan of Care reviewed with: patient         2023

## 2023-01-29 NOTE — PROGRESS NOTES
[facility] Medicine  Progress Note    Patient Name: Michel De La Paz  MRN: [id number]  Patient Class: IP- Inpatient   Admission Date: 1/13/2023  Length of Stay: 16 days  Attending Physician: Ac Childers MD  Primary Care Provider: WESLY Best        Subjective:     Principal Problem:Acute respiratory failure with hypoxia and hypercarbia        HPI:   68 yo F admitted overnight with worsening weakenss, sob and tachycardia. H/  H low initially and got 2 U pRBC overnight. H/H stable this and trend up.  EKG atrial fibrillation with rvr better.            Overview/Hospital Course:   68 yo F admitted overnight with worsening weakenss, sob and tachycardia. H/  H low initially and got 2 U pRBC overnight. H/H stable this and trend up.  EKG atrial fibrillation with rvr better.    01/15   HR better controlled overnight on amiodarone increased TID    01/16 c/o constipation overngiht and this AM  iron low on panel  HR stable overnight  01/17/2023 pt c/o no bm x 2 weeks.  H&H has been stable  01/18/2023 denies pain, no BM yet, H&H stable, very weak and unsteady gait, working with PT.  01/19/2023 pt had large BM today, still very weak, got SOB overnight placed on 7L oximizer due to drop in oxygen sats.  Echo EF 55-60% and CXR shows pulmonary edema.  Pt c/o feeling weak.  Surgery plans for EGD in am.  pt has had recent weight loss about 4 kng since April, was 58 kg now 54, BMI down to 20.  01/20/2023 pt more obtunded overnight this am with increased weakness and not following commands, stat ABG done showed PCO2 elevated and acute respiratory acidosis, transferred pt to ICU and placed on BIPAP, 1 hr ABG very minimal improvement and pt GSC of 6 low with minimally responsive withdraws to pain, no verbal responses  01/21/2023 pt stable on vent overnight, abg this am looks good, will start to wean, CXR no infiltrates, blood and urine cx repeated and pending.     01/22/2023 patient remained  stable on the vent were able to wean her rate a bit however her pCO2 did go up to the 80s ABG is still reasonable at 7.3  but with pCO2 very high in the 80  we put him back on AC with a rate of 16  01/23/2023 patient stable overnight her ABG actually looks better this a.m. her pCO2 has normalized after we switched her back to AC with a rate is 16 she is tolerating currently her FiO2 is at 50% respiratory rate is 20 she is still on the Levophed at 13.5 mics and getting Clinimix at 40 and propofol at 20  01/24/2023 tolerating ventilator wean overnight FiO2 50% down  01/25/2023 FiO2 down to 40% this AM -assist control  Awake and alert on vent minimal sedation  01/26/2023 stable on simv overnight but PCO2 trending up to 56.2 off AC mode  Awakening with sedation down  Cxr stable   Blood counts improved  01/27/2023 extubated this AM tolerated for short time  Developed acute respiratory distress O2 sats 60's minimally responive   Reintubated by sheesia  01/28/2023 abg stable on simv this Am   AWAKE AND ALERT  No more cough  Pain better   Tube feeds for the rec's talked to ivelisse will start slow  Not tolerated 1st time again  01/29/2023  No major change overnight remain on simv 30%   Abg ok this AM           Review of Systems   All other systems reviewed and are negative.  Objective:     Vital Signs (Most Recent):  Temp: 98.1 °F (36.7 °C) (01/29/23 1101)  Pulse: 63 (01/29/23 1419)  Resp: (!) 21 (01/29/23 1139)  BP: 117/66 (01/29/23 0845)  SpO2: (!) 93 % (01/29/23 1419)   Vital Signs (24h Range):  Temp:  [98 °F (36.7 °C)-99.1 °F (37.3 °C)] 98.1 °F (36.7 °C)  Pulse:  [63-79] 63  Resp:  [16-22] 21  SpO2:  [90 %-100 %] 93 %  BP: ()/(53-67) 117/66     Weight: 57.7 kg (127 lb 3.2 oz)  Body mass index is 21.83 kg/m².    Intake/Output Summary (Last 24 hours) at 1/29/2023 1422  Last data filed at 1/29/2023 0500  Gross per 24 hour   Intake 1089 ml   Output 1600 ml   Net -511 ml      Physical Exam  Vitals and nursing note  reviewed.   Constitutional:       Comments: Sedated on vent   HENT:      Head: Normocephalic and atraumatic.   Cardiovascular:      Rate and Rhythm: Normal rate.      Heart sounds: Normal heart sounds.   Pulmonary:      Effort: Pulmonary effort is normal.      Breath sounds: Normal breath sounds.      Comments: On vent  Abdominal:      General: Abdomen is flat.      Palpations: Abdomen is soft.       Significant Labs: All pertinent labs within the past 24 hours have been reviewed.    Significant Imaging: I have reviewed all pertinent imaging results/findings within the past 24 hours.      Assessment/Plan:      * Acute respiratory failure with hypoxia and hypercarbia  Patient with Hypercapnic and Hypoxic Respiratory failure which is Acute on chronic.  she is not on home oxygen. Supplemental oxygen was provided and noted- Vent Mode: SIMV  Oxygen Concentration (%):  [30] 30  Resp Rate Total:  [15 br/min-24 br/min] 20 br/min  Vt Set:  [450 mL] 450 mL  PEEP/CPAP:  [5 cmH20] 5 cmH20  Pressure Support:  [10 cmH20] 10 cmH20  Mean Airway Pressure:  [10 bnX79-68 cmH20] 11 cmH20.   Signs/symptoms of respiratory failure include- tachypnea, increased work of breathing and respiratory distress. Contributing diagnoses includes - CHF and COPD Labs and images were reviewed. Patient Has recent ABG, which has been reviewed. Will treat underlying causes and adjust management of respiratory failure as follows-     She will remain on the AC, get our case management consult to attempt for LTAC placement continue all other meds and treatment including her leave albuterol nebs IV steroids IV antibiotics follow up on cultures as they report    Continue to try and wean ventilator overnight from SIMV to spontaneous if tolerated     Failed extubation   Had to reintubate and put on ventilator   Stable on SIMV  Need LTAC  Wean vent if toelrated    Recent weight loss  Nutrition consulted. Most recent weight and BMI monitored-  Patient will have  EGD and colonoscopy once stabilized from a respiratory standpoint                        Measurements:  Wt Readings from Last 1 Encounters:   01/23/23 51.1 kg (112 lb 11.2 oz)   Body mass index is 19.34 kg/m².    Recommendations:      Patient has been screened and assessed by RD. RD will follow patient.      Transaminitis   Improved will recheck labs CMP in a.m.      Hematuria   Recently been worked up by Urology has some small stones family reports other workup was negative including what sounds like a endoscopy or bladder scope      Microcytic anemia   Patient was scheduled for EGD and had a transfusion at the time of admission here however EGD was postponed due to acute respiratory failure with hypercarbia requiring intubation she will follow up with Dr. Abel once he is more stable  H&H remains stable will continue to monitor      Atrial fibrillation with RVR  More p.o. and recheck levels in a.m.Patient with Paroxysmal (<7 days) atrial fibrillation which is controlled currently with Amiodarone. Patient is currently in sinus rhythm.[id number] Score: 3. HASBLED Score:  . Anticoagulation not indicated due to anemia.    Her heart rate has been stable currently she is 62 and sinus    Rate controlled overnight     Severe sepsis with septic shock  This patient does have evidence of infective focus  My overall impression is septic shock. Vital signs were reviewed and noted in progress note.  Antibiotics given-   Antibiotics (From admission, onward)      Start     Stop Route Frequency Ordered    01/22/23 0400  piperacillin-tazobactam (ZOSYN) 4.5 g in dextrose 5 % in water (D5W) 5 % 100 mL IVPB (MB+)         -- IV Every 8 hours (non-standard times) 01/21/23 1521          Cultures were taken-   Microbiology Results (last 7 days)       ** No results found for the last 168 hours. **          Latest lactate reviewed, they are-  No results for input(s): LACTATE in the last 72 hours.    Organ dysfunction indicated by Acute  kidney injury, Encephalopathy  and Acute respiratory failure  Source-      Source control Achieved by- iv abx and This patient does have evidence of infective focus  My overall impression is septic shock.  Source:  Sputum culture from intubation is pending shows 3 organisms growing rare amounts will follow up  Antibiotics given-   Antibiotics (From admission, onward)      Start     Stop Route Frequency Ordered    01/22/23 0400  piperacillin-tazobactam (ZOSYN) 4.5 g in dextrose 5 % in water (D5W) 5 % 100 mL IVPB (MB+)         -- IV Every 8 hours (non-standard times) 01/21/23 1521          Latest lactate reviewed-  No results for input(s): LACTATE in the last 72 hours.  Organ dysfunction indicated by Acute kidney injury and Acute respiratory failure    Shock with decreased perfusion noted, Fluid challenge  was given at 30cc/kg and was not given at 30cc/kg due to      Post- resuscitation assessment- (Done after fluids given for shock)  Vital signs post fluid administrations were-  Temp Readings from Last 1 Encounters:   01/23/23 99.4 °F (37.4 °C)     BP Readings from Last 1 Encounters:   01/23/23 132/72     Pulse Readings from Last 1 Encounters:   01/23/23 68       Perfusion exam was performed within 6 hours of septic shock presentation after bolus shows Adequate tissue perfusion assessed by non-invasive monitoring  Will Start Pressors- Levophed for MAP of 65  Source control achieved by:  Iv abx   echo done showed EF of 60-65% with normal right  Ventricular function   CT angio chest done on admission did not show any PE and no acute finding     CT of the abdomen done on admit showed some small renal calculi no other acute finding    Patient remains on the Levophed at 13.5 mils per hour which is a 0.12 milligram/kilogram per minute dose we will continue and wean as tolerated        Hypokalemia    Her potassium is improved we will continue the p.o. replacement for now recheck labs in a.m.      VTE Risk Mitigation (From  admission, onward)           Ordered     Place sequential compression device  Until discontinued         01/21/23 1326                    Discharge Planning   LASHAWN:      Code Status: Full Code   Is the patient medically ready for discharge?:     Reason for patient still in hospital (select all that apply): Patient trending condition, Laboratory test, Treatment, Imaging and Consult recommendations           Critical care time = 40 minutes          Ac Childers MD  Department of Hospital Medicine   [facility]

## 2023-01-29 NOTE — ASSESSMENT & PLAN NOTE
Patient with Hypercapnic and Hypoxic Respiratory failure which is Acute on chronic.  she is not on home oxygen. Supplemental oxygen was provided and noted- Vent Mode: SIMV  Oxygen Concentration (%):  [30] 30  Resp Rate Total:  [15 br/min-24 br/min] 20 br/min  Vt Set:  [450 mL] 450 mL  PEEP/CPAP:  [5 cmH20] 5 cmH20  Pressure Support:  [10 cmH20] 10 cmH20  Mean Airway Pressure:  [10 hdL40-81 cmH20] 11 cmH20.   Signs/symptoms of respiratory failure include- tachypnea, increased work of breathing and respiratory distress. Contributing diagnoses includes - CHF and COPD Labs and images were reviewed. Patient Has recent ABG, which has been reviewed. Will treat underlying causes and adjust management of respiratory failure as follows-     She will remain on the AC, get our case management consult to attempt for LTAC placement continue all other meds and treatment including her leave albuterol nebs IV steroids IV antibiotics follow up on cultures as they report    Continue to try and wean ventilator overnight from SIMV to spontaneous if tolerated     Failed extubation   Had to reintubate and put on ventilator   Stable on SIMV  Need LTAC  Wean vent if toelrated

## 2023-01-30 LAB
ABS NEUT CALC (OHS): 6.66 X10(3)/MCL (ref 2.1–9.2)
ANION GAP SERPL CALC-SCNC: 2 MEQ/L (ref 2–13)
ANISOCYTOSIS BLD QL SMEAR: ABNORMAL
BASE EXCESS BLD CALC-SCNC: 7.8 MMOL/L
BLOOD GAS SAMPLE TYPE (OHS): ABNORMAL
BUN SERPL-MCNC: 19 MG/DL (ref 7–20)
CALCIUM SERPL-MCNC: 8.5 MG/DL (ref 8.4–10.2)
CHLORIDE SERPL-SCNC: 98 MMOL/L (ref 98–110)
CO2 SERPL-SCNC: 36 MMOL/L (ref 21–32)
CREAT SERPL-MCNC: 0.54 MG/DL (ref 0.66–1.25)
CREAT/UREA NIT SERPL: 35 (ref 12–20)
ELLIPTOCYTOSIS (OHS): ABNORMAL
EOSINOPHIL NFR BLD MANUAL: 0.15 X10(3)/MCL (ref 0–0.9)
EOSINOPHIL NFR BLD MANUAL: 2 % (ref 0–8)
ERYTHROCYTE [DISTWIDTH] IN BLOOD BY AUTOMATED COUNT: 25.2 % (ref 11–14.5)
FIO2 BLOOD GAS (OHS): 30 %
GFR SERPLBLD CREATININE-BSD FMLA CKD-EPI: >90 MLS/MIN/1.73/M2
GLUCOSE SERPL-MCNC: 112 MG/DL (ref 70–115)
HCO3 BLDA-SCNC: 32.6 MMOL/L
HCT VFR BLD AUTO: 26.7 % (ref 36–48)
HGB BLD-MCNC: 7.8 GM/DL (ref 11.8–16)
IMM GRANULOCYTES # BLD AUTO: 0.03 X10(3)/MCL (ref 0–0.03)
IMM GRANULOCYTES NFR BLD AUTO: 0.4 % (ref 0–0.5)
LYMPHOCYTES NFR BLD MANUAL: 0.44 X10(3)/MCL
LYMPHOCYTES NFR BLD MANUAL: 6 % (ref 13–40)
MCH RBC QN AUTO: 22.5 PG (ref 27–34)
MCV RBC AUTO: 76.9 FL (ref 79–99)
MEAN CELL HEMOGLOBIN CONCENTRATION (OHS) G/DL: 29.2 G/DL (ref 31–37)
MECH RR (OHS): 16 B/MIN
MECH VT (OHS): 450 ML
MICROCYTES BLD QL SMEAR: ABNORMAL
MODE (OHS): ABNORMAL
MONOCYTES NFR BLD MANUAL: 0.15 X10(3)/MCL (ref 0.1–1.3)
MONOCYTES NFR BLD MANUAL: 2 % (ref 2–11)
NEUTROPHILS NFR BLD MANUAL: 90 % (ref 47–80)
NOTIFIED (OHS): ABNORMAL
NOTIFIED BY (OHS): ABNORMAL
NRBC BLD AUTO-RTO: 0 % (ref 0–1)
PCO2 BLDA: 47.6 MMHG (ref 35–45)
PEEP (OHS): 5 CMH2O
PH BLDA: 7.45 [PH] (ref 7.38–7.49)
PLATELET # BLD AUTO: 346 X10(3)/MCL (ref 140–371)
PLATELET # BLD EST: NORMAL 10*3/UL
PMV BLD AUTO: 10.5 FL (ref 9.4–12.4)
PO2 BLDA: 72.2 MMHG (ref 80–100)
POCT GLUCOSE: 100 MG/DL (ref 70–110)
POCT GLUCOSE: 114 MG/DL (ref 70–110)
POCT GLUCOSE: 124 MG/DL (ref 70–110)
POIKILOCYTOSIS BLD QL SMEAR: ABNORMAL
POTASSIUM SERPL-SCNC: 3.8 MMOL/L (ref 3.5–5.1)
PS (OHS): 10 CMH2O
RBC # BLD AUTO: 3.47 X10(6)/MCL (ref 4–5.1)
RBC MORPH BLD: ABNORMAL
SAO2 % BLDA: 94.2 %
SODIUM SERPL-SCNC: 136 MMOL/L (ref 135–145)
TARGETS BLD QL SMEAR: ABNORMAL
WBC # SPEC AUTO: 7.4 X10(3)/MCL (ref 4–11.5)

## 2023-01-30 PROCEDURE — 20000000 HC ICU ROOM

## 2023-01-30 PROCEDURE — 97530 THERAPEUTIC ACTIVITIES: CPT | Mod: CQ

## 2023-01-30 PROCEDURE — 63600175 PHARM REV CODE 636 W HCPCS: Performed by: FAMILY MEDICINE

## 2023-01-30 PROCEDURE — 36415 COLL VENOUS BLD VENIPUNCTURE: CPT | Performed by: FAMILY MEDICINE

## 2023-01-30 PROCEDURE — 80048 BASIC METABOLIC PNL TOTAL CA: CPT | Performed by: FAMILY MEDICINE

## 2023-01-30 PROCEDURE — 25000242 PHARM REV CODE 250 ALT 637 W/ HCPCS

## 2023-01-30 PROCEDURE — 94761 N-INVAS EAR/PLS OXIMETRY MLT: CPT

## 2023-01-30 PROCEDURE — 27000221 HC OXYGEN, UP TO 24 HOURS

## 2023-01-30 PROCEDURE — 25000003 PHARM REV CODE 250

## 2023-01-30 PROCEDURE — 99900035 HC TECH TIME PER 15 MIN (STAT)

## 2023-01-30 PROCEDURE — 99900026 HC AIRWAY MAINTENANCE (STAT)

## 2023-01-30 PROCEDURE — 85027 COMPLETE CBC AUTOMATED: CPT | Performed by: FAMILY MEDICINE

## 2023-01-30 PROCEDURE — 94003 VENT MGMT INPAT SUBQ DAY: CPT

## 2023-01-30 PROCEDURE — 25000242 PHARM REV CODE 250 ALT 637 W/ HCPCS: Performed by: FAMILY MEDICINE

## 2023-01-30 PROCEDURE — 63600175 PHARM REV CODE 636 W HCPCS

## 2023-01-30 PROCEDURE — 82803 BLOOD GASES ANY COMBINATION: CPT

## 2023-01-30 PROCEDURE — 94640 AIRWAY INHALATION TREATMENT: CPT

## 2023-01-30 PROCEDURE — 25000003 PHARM REV CODE 250: Performed by: FAMILY MEDICINE

## 2023-01-30 PROCEDURE — 85025 COMPLETE CBC W/AUTO DIFF WBC: CPT | Performed by: FAMILY MEDICINE

## 2023-01-30 PROCEDURE — 36600 WITHDRAWAL OF ARTERIAL BLOOD: CPT

## 2023-01-30 RX ADMIN — OXYBUTYNIN CHLORIDE 5 MG: 5 TABLET, EXTENDED RELEASE ORAL at 09:01

## 2023-01-30 RX ADMIN — IPRATROPIUM BROMIDE 0.5 MG: 0.5 SOLUTION RESPIRATORY (INHALATION) at 07:01

## 2023-01-30 RX ADMIN — PREGABALIN 50 MG: 25 CAPSULE ORAL at 08:01

## 2023-01-30 RX ADMIN — FERROUS SULFATE TAB 325 MG (65 MG ELEMENTAL FE) 1 EACH: 325 (65 FE) TAB at 03:01

## 2023-01-30 RX ADMIN — BUDESONIDE INHALATION 0.5 MG: 0.5 SUSPENSION RESPIRATORY (INHALATION) at 08:01

## 2023-01-30 RX ADMIN — FERROUS SULFATE TAB 325 MG (65 MG ELEMENTAL FE) 1 EACH: 325 (65 FE) TAB at 09:01

## 2023-01-30 RX ADMIN — PROPOFOL 35 MCG/KG/MIN: 10 INJECTION, EMULSION INTRAVENOUS at 03:01

## 2023-01-30 RX ADMIN — IPRATROPIUM BROMIDE 0.5 MG: 0.5 SOLUTION RESPIRATORY (INHALATION) at 11:01

## 2023-01-30 RX ADMIN — LEVALBUTEROL HYDROCHLORIDE 1.25 MG: 1.25 SOLUTION, CONCENTRATE RESPIRATORY (INHALATION) at 11:01

## 2023-01-30 RX ADMIN — RACEPINEPHRINE HYDROCHLORIDE 0.5 ML: 11.25 SOLUTION RESPIRATORY (INHALATION) at 03:01

## 2023-01-30 RX ADMIN — FUROSEMIDE 20 MG: 20 TABLET ORAL at 09:01

## 2023-01-30 RX ADMIN — LEVALBUTEROL HYDROCHLORIDE 1.25 MG: 1.25 SOLUTION, CONCENTRATE RESPIRATORY (INHALATION) at 06:01

## 2023-01-30 RX ADMIN — LEUCINE, PHENYLALANINE, LYSINE, METHIONINE, ISOLEUCINE, VALINE, HISTIDINE, THREONINE, TRYPTOPHAN, ALANINE, GLYCINE, ARGININE, PROLINE, SERINE, TYROSINE, SODIUM ACETATE, DIBASIC POTASSIUM PHOSPHATE, MAGNESIUM CHLORIDE, SODIUM CHLORIDE, CALCIUM CHLORIDE, DEXTROSE
311; 238; 247; 170; 255; 247; 204; 179; 77; 880; 438; 489; 289; 213; 17; 297; 261; 51; 77; 33; 5 INJECTION INTRAVENOUS at 04:01

## 2023-01-30 RX ADMIN — METFORMIN HYDROCHLORIDE 500 MG: 500 TABLET, FILM COATED ORAL at 09:01

## 2023-01-30 RX ADMIN — PREGABALIN 50 MG: 25 CAPSULE ORAL at 03:01

## 2023-01-30 RX ADMIN — LEVALBUTEROL HYDROCHLORIDE 1.25 MG: 1.25 SOLUTION, CONCENTRATE RESPIRATORY (INHALATION) at 07:01

## 2023-01-30 RX ADMIN — LEVALBUTEROL HYDROCHLORIDE 1.25 MG: 1.25 SOLUTION, CONCENTRATE RESPIRATORY (INHALATION) at 03:01

## 2023-01-30 RX ADMIN — POLYETHYLENE GLYCOL 3350 17 G: 17 POWDER, FOR SOLUTION ORAL at 09:01

## 2023-01-30 RX ADMIN — IPRATROPIUM BROMIDE 0.5 MG: 0.5 SOLUTION RESPIRATORY (INHALATION) at 03:01

## 2023-01-30 RX ADMIN — LEVALBUTEROL HYDROCHLORIDE 1.25 MG: 1.25 SOLUTION, CONCENTRATE RESPIRATORY (INHALATION) at 12:01

## 2023-01-30 RX ADMIN — FLUTICASONE PROPIONATE 50 MCG: 50 SPRAY, METERED NASAL at 09:01

## 2023-01-30 RX ADMIN — METFORMIN HYDROCHLORIDE 500 MG: 500 TABLET, FILM COATED ORAL at 05:01

## 2023-01-30 RX ADMIN — FAMOTIDINE 20 MG: 10 INJECTION INTRAVENOUS at 08:01

## 2023-01-30 RX ADMIN — BUDESONIDE INHALATION 0.5 MG: 0.5 SUSPENSION RESPIRATORY (INHALATION) at 06:01

## 2023-01-30 RX ADMIN — FAMOTIDINE 20 MG: 10 INJECTION INTRAVENOUS at 09:01

## 2023-01-30 RX ADMIN — NOREPINEPHRINE BITARTRATE 0.06 MCG/KG/MIN: 1 INJECTION, SOLUTION, CONCENTRATE INTRAVENOUS at 11:01

## 2023-01-30 RX ADMIN — OXYCODONE HYDROCHLORIDE AND ACETAMINOPHEN 500 MG: 500 TABLET ORAL at 09:01

## 2023-01-30 RX ADMIN — IPRATROPIUM BROMIDE 0.5 MG: 0.5 SOLUTION RESPIRATORY (INHALATION) at 12:01

## 2023-01-30 RX ADMIN — ATORVASTATIN CALCIUM 80 MG: 40 TABLET, FILM COATED ORAL at 09:01

## 2023-01-30 RX ADMIN — IRON SUCROSE 100 MG: 20 INJECTION, SOLUTION INTRAVENOUS at 09:01

## 2023-01-30 RX ADMIN — THEOPHYLLINE 400 MG: 400 TABLET, EXTENDED RELEASE ORAL at 09:01

## 2023-01-30 RX ADMIN — OXYCODONE HYDROCHLORIDE AND ACETAMINOPHEN 500 MG: 500 TABLET ORAL at 08:01

## 2023-01-30 RX ADMIN — IPRATROPIUM BROMIDE 0.5 MG: 0.5 SOLUTION RESPIRATORY (INHALATION) at 06:01

## 2023-01-30 RX ADMIN — FERROUS SULFATE TAB 325 MG (65 MG ELEMENTAL FE) 1 EACH: 325 (65 FE) TAB at 08:01

## 2023-01-30 RX ADMIN — PROPOFOL 35 MCG/KG/MIN: 10 INJECTION, EMULSION INTRAVENOUS at 04:01

## 2023-01-30 RX ADMIN — PREGABALIN 50 MG: 25 CAPSULE ORAL at 09:01

## 2023-01-30 NOTE — PT/OT/SLP PROGRESS
Physical Therapy Treatment    Patient Name:  Michel De La Paz   MRN:  [id number]    Recommendations:     Discharge Recommendations: nursing facility, skilled, rehabilitation facility, LTACH (long-term acute care hospital)  Discharge Equipment Recommendations:    Barriers to discharge: None    Assessment:     Michel De La Paz is a 67 y.o. female admitted with a medical diagnosis of Acute respiratory failure with hypoxia and hypercarbia.  She presents with the following impairments/functional limitations: weakness, impaired endurance, impaired functional mobility, impaired balance .    Rehab Prognosis: Fair; patient would benefit from acute skilled PT services to address these deficits and reach maximum level of function.    Recent Surgery: * No surgery found *      Plan:     During this hospitalization, patient to be seen daily to address the identified rehab impairments via therapeutic exercises and progress toward the following goals:    Plan of Care Expires:       Subjective     Chief Complaint: overall weakness  Patient/Family Comments/goals: increase strength and balance for adls and gait.  Pain/Comfort:  Pain Rating 1: 0/10      Objective:     Communicated with nsg prior to session.  Patient found supine with bed alarm, blood pressure cuff, waterman catheter, oxygen, ventilator upon PT entry to room.     General Precautions: Standard,    Orthopedic Precautions:    Braces:    Respiratory Status: Nasal cannula, flow 2 L/min     Functional Mobility:        AM-PAC 6 CLICK MOBILITY  Turning over in bed (including adjusting bedclothes, sheets and blankets)?: 1  Sitting down on and standing up from a chair with arms (e.g., wheelchair, bedside commode, etc.): 1  Moving from lying on back to sitting on the side of the bed?: 1  Moving to and from a bed to a chair (including a wheelchair)?: 1  Need to walk in hospital room?: 1  Climbing 3-5 steps with a railing?: 1  Basic Mobility Total Score: 6       Treatment &  Education:  AAROM to bilateral UE/LE's x 10 x 3 to improve mob and decrease risk of contractures.    Patient left supine with all lines intact and call button in reach..    GOALS:   Multidisciplinary Problems       Physical Therapy Goals          Problem: Physical Therapy    Goal Priority Disciplines Outcome Goal Variances Interventions   Physical Therapy Goal     PT, PT/OT Ongoing, Progressing     Description: Goals to be met by: discharge     Patient will increase functional independence with mobility by performin. Supine to sit with MInimal Assistance                         Time Tracking:     PT Received On: 23  PT Start Time: 715     PT Stop Time: 730  PT Total Time (min): 15 min     Billable Minutes: Therapeutic Activity 15    Treatment Type: Treatment  PT/PTA: PTA     PTA Visit Number: 1     2023

## 2023-01-30 NOTE — PROGRESS NOTES
[facility] Medicine  Progress Note    Patient Name: Michel De La Paz  MRN: [id number]  Patient Class: IP- Inpatient   Admission Date: 1/13/2023  Length of Stay: 17 days  Attending Physician: Ac Childers MD  Primary Care Provider: WESLY Best        Subjective:     Principal Problem:Acute respiratory failure with hypoxia and hypercarbia        HPI:   66 yo F admitted overnight with worsening weakenss, sob and tachycardia. H/  H low initially and got 2 U pRBC overnight. H/H stable this and trend up.  EKG atrial fibrillation with rvr better.            Overview/Hospital Course:   66 yo F admitted overnight with worsening weakenss, sob and tachycardia. H/  H low initially and got 2 U pRBC overnight. H/H stable this and trend up.  EKG atrial fibrillation with rvr better.    01/15   HR better controlled overnight on amiodarone increased TID    01/16 c/o constipation overngiht and this AM  iron low on panel  HR stable overnight  01/17/2023 pt c/o no bm x 2 weeks.  H&H has been stable  01/18/2023 denies pain, no BM yet, H&H stable, very weak and unsteady gait, working with PT.  01/19/2023 pt had large BM today, still very weak, got SOB overnight placed on 7L oximizer due to drop in oxygen sats.  Echo EF 55-60% and CXR shows pulmonary edema.  Pt c/o feeling weak.  Surgery plans for EGD in am.  pt has had recent weight loss about 4 kng since April, was 58 kg now 54, BMI down to 20.  01/20/2023 pt more obtunded overnight this am with increased weakness and not following commands, stat ABG done showed PCO2 elevated and acute respiratory acidosis, transferred pt to ICU and placed on BIPAP, 1 hr ABG very minimal improvement and pt GSC of 6 low with minimally responsive withdraws to pain, no verbal responses  01/21/2023 pt stable on vent overnight, abg this am looks good, will start to wean, CXR no infiltrates, blood and urine cx repeated and pending.     01/22/2023 patient remained  stable on the vent were able to wean her rate a bit however her pCO2 did go up to the 80s ABG is still reasonable at 7.3  but with pCO2 very high in the 80  we put him back on AC with a rate of 16  01/23/2023 patient stable overnight her ABG actually looks better this a.m. her pCO2 has normalized after we switched her back to AC with a rate is 16 she is tolerating currently her FiO2 is at 50% respiratory rate is 20 she is still on the Levophed at 13.5 mics and getting Clinimix at 40 and propofol at 20  01/24/2023 tolerating ventilator wean overnight FiO2 50% down  01/25/2023 FiO2 down to 40% this AM -assist control  Awake and alert on vent minimal sedation  01/26/2023 stable on simv overnight but PCO2 trending up to 56.2 off AC mode  Awakening with sedation down  Cxr stable   Blood counts improved  01/27/2023 extubated this AM tolerated for short time  Developed acute respiratory distress O2 sats 60's minimally responive   Reintubated by sheesia  01/28/2023 abg stable on simv this Am   AWAKE AND ALERT  No more cough  Pain better   Tube feeds for the rec's talked to ivelisse will start slow  Not tolerated 1st time again  01/29/2023  No major change overnight remain on simv 30%   Abg ok this AM   01/30/2023  Unable to wean vent overnight  Tolerating tube feeds          Review of Systems   All other systems reviewed and are negative.  Objective:     Vital Signs (Most Recent):  Temp: 98.5 °F (36.9 °C) (01/30/23 1120)  Pulse: 69 (01/30/23 1120)  Resp: (!) 22 (01/30/23 1120)  BP: 98/63 (01/30/23 1100)  SpO2: 96 % (01/30/23 1120)   Vital Signs (24h Range):  Temp:  [97.8 °F (36.6 °C)-99.7 °F (37.6 °C)] 98.5 °F (36.9 °C)  Pulse:  [61-93] 69  Resp:  [16-24] 22  SpO2:  [93 %-99 %] 96 %  BP: ()/(48-67) 98/63     Weight: 56.4 kg (124 lb 4.8 oz)  Body mass index is 21.34 kg/m².    Intake/Output Summary (Last 24 hours) at 1/30/2023 1143  Last data filed at 1/30/2023 0500  Gross per 24 hour   Intake 1628.5 ml   Output 1770 ml    Net -141.5 ml      Physical Exam  Vitals and nursing note reviewed.   Constitutional:       Comments: Sedated on vent   HENT:      Head: Normocephalic and atraumatic.   Cardiovascular:      Rate and Rhythm: Normal rate.      Heart sounds: Normal heart sounds.   Pulmonary:      Effort: Pulmonary effort is normal.      Breath sounds: Normal breath sounds.      Comments: On vent  Abdominal:      General: Abdomen is flat.      Palpations: Abdomen is soft.       Significant Labs: All pertinent labs within the past 24 hours have been reviewed.    Significant Imaging: I have reviewed all pertinent imaging results/findings within the past 24 hours.      Assessment/Plan:      * Acute respiratory failure with hypoxia and hypercarbia  Patient with Hypercapnic and Hypoxic Respiratory failure which is Acute on chronic.  she is not on home oxygen. Supplemental oxygen was provided and noted- Vent Mode: SIMV  Oxygen Concentration (%):  [30] 30  Resp Rate Total:  [16 br/min-27 br/min] 22 br/min  Vt Set:  [450 mL] 450 mL  PEEP/CPAP:  [5 cmH20] 5 cmH20  Pressure Support:  [10 cmH20] 10 cmH20  Mean Airway Pressure:  [10 zvL10-61 cmH20] 11 cmH20.   Signs/symptoms of respiratory failure include- tachypnea, increased work of breathing and respiratory distress. Contributing diagnoses includes - CHF and COPD Labs and images were reviewed. Patient Has recent ABG, which has been reviewed. Will treat underlying causes and adjust management of respiratory failure as follows-     She will remain on the AC, get our case management consult to attempt for LTAC placement continue all other meds and treatment including her leave albuterol nebs IV steroids IV antibiotics follow up on cultures as they report    Continue to try and wean ventilator overnight from SIMV to spontaneous if tolerated     Failed extubation   Had to reintubate and put on ventilator   Stable on SIMV  Need LTAC for ventilator weaning  Wean vent if toelrated    Recent weight  loss  Nutrition consulted. Most recent weight and BMI monitored-  Patient will have EGD and colonoscopy once stabilized from a respiratory standpoint                        Measurements:  Wt Readings from Last 1 Encounters:   01/23/23 51.1 kg (112 lb 11.2 oz)   Body mass index is 19.34 kg/m².    Recommendations:      Patient has been screened and assessed by RD. RD will follow patient.      Transaminitis   Improved will recheck labs CMP in a.m.      Hematuria   Recently been worked up by Urology has some small stones family reports other workup was negative including what sounds like a endoscopy or bladder scope      Microcytic anemia   Patient was scheduled for EGD and had a transfusion at the time of admission here however EGD was postponed due to acute respiratory failure with hypercarbia requiring intubation she will follow up with Dr. Abel once he is more stable  H&H remains stable will continue to monitor      Atrial fibrillation with RVR  More p.o. and recheck levels in a.m.Patient with Paroxysmal (<7 days) atrial fibrillation which is controlled currently with Amiodarone. Patient is currently in sinus rhythm.[id number] Score: 3. HASBLED Score:  . Anticoagulation not indicated due to anemia.    Her heart rate has been stable currently she is 62 and sinus    Rate controlled overnight     Severe sepsis with septic shock  This patient does have evidence of infective focus  My overall impression is septic shock. Vital signs were reviewed and noted in progress note.  Antibiotics given-   Antibiotics (From admission, onward)    Start     Stop Route Frequency Ordered    01/22/23 0400  piperacillin-tazobactam (ZOSYN) 4.5 g in dextrose 5 % in water (D5W) 5 % 100 mL IVPB (MB+)         -- IV Every 8 hours (non-standard times) 01/21/23 1521        Cultures were taken-   Microbiology Results (last 7 days)     ** No results found for the last 168 hours. **        Latest lactate reviewed, they are-  No results for  input(s): LACTATE in the last 72 hours.    Organ dysfunction indicated by Acute kidney injury, Encephalopathy  and Acute respiratory failure  Source-      Source control Achieved by- iv abx and This patient does have evidence of infective focus  My overall impression is septic shock.  Source:  Sputum culture from intubation is pending shows 3 organisms growing rare amounts will follow up  Antibiotics given-   Antibiotics (From admission, onward)    Start     Stop Route Frequency Ordered    01/22/23 0400  piperacillin-tazobactam (ZOSYN) 4.5 g in dextrose 5 % in water (D5W) 5 % 100 mL IVPB (MB+)         -- IV Every 8 hours (non-standard times) 01/21/23 1521        Latest lactate reviewed-  No results for input(s): LACTATE in the last 72 hours.  Organ dysfunction indicated by Acute kidney injury and Acute respiratory failure    Shock with decreased perfusion noted, Fluid challenge  was given at 30cc/kg and was not given at 30cc/kg due to      Post- resuscitation assessment- (Done after fluids given for shock)  Vital signs post fluid administrations were-  Temp Readings from Last 1 Encounters:   01/23/23 99.4 °F (37.4 °C)     BP Readings from Last 1 Encounters:   01/23/23 132/72     Pulse Readings from Last 1 Encounters:   01/23/23 68       Perfusion exam was performed within 6 hours of septic shock presentation after bolus shows Adequate tissue perfusion assessed by non-invasive monitoring  Will Start Pressors- Levophed for MAP of 65  Source control achieved by:  Iv abx   echo done showed EF of 60-65% with normal right  Ventricular function   CT angio chest done on admission did not show any PE and no acute finding     CT of the abdomen done on admit showed some small renal calculi no other acute finding    Patient remains on the Levophed at 13.5 mils per hour which is a 0.12 milligram/kilogram per minute dose we will continue and wean as tolerated        Hypokalemia    Her potassium is improved we will continue the  p.o. replacement for now recheck labs in a.m.      VTE Risk Mitigation (From admission, onward)         Ordered     Place sequential compression device  Until discontinued         01/21/23 1326                Discharge Planning   LASHAWN:      Code Status: Full Code   Is the patient medically ready for discharge?:     Reason for patient still in hospital (select all that apply): Patient trending condition, Laboratory test, Treatment and Imaging           Critical care time = 39 minutes           Ac Childers MD  Department of Hospital Medicine   [facility]

## 2023-01-30 NOTE — ASSESSMENT & PLAN NOTE
Patient with Hypercapnic and Hypoxic Respiratory failure which is Acute on chronic.  she is not on home oxygen. Supplemental oxygen was provided and noted- Vent Mode: SIMV  Oxygen Concentration (%):  [30] 30  Resp Rate Total:  [16 br/min-27 br/min] 22 br/min  Vt Set:  [450 mL] 450 mL  PEEP/CPAP:  [5 cmH20] 5 cmH20  Pressure Support:  [10 cmH20] 10 cmH20  Mean Airway Pressure:  [10 msE96-91 cmH20] 11 cmH20.   Signs/symptoms of respiratory failure include- tachypnea, increased work of breathing and respiratory distress. Contributing diagnoses includes - CHF and COPD Labs and images were reviewed. Patient Has recent ABG, which has been reviewed. Will treat underlying causes and adjust management of respiratory failure as follows-     She will remain on the AC, get our case management consult to attempt for LTAC placement continue all other meds and treatment including her leave albuterol nebs IV steroids IV antibiotics follow up on cultures as they report    Continue to try and wean ventilator overnight from SIMV to spontaneous if tolerated     Failed extubation   Had to reintubate and put on ventilator   Stable on SIMV  Need LTAC for ventilator weaning  Wean vent if toelrated

## 2023-01-30 NOTE — SUBJECTIVE & OBJECTIVE
Review of Systems   All other systems reviewed and are negative.  Objective:     Vital Signs (Most Recent):  Temp: 98.5 °F (36.9 °C) (01/30/23 1120)  Pulse: 69 (01/30/23 1120)  Resp: (!) 22 (01/30/23 1120)  BP: 98/63 (01/30/23 1100)  SpO2: 96 % (01/30/23 1120)   Vital Signs (24h Range):  Temp:  [97.8 °F (36.6 °C)-99.7 °F (37.6 °C)] 98.5 °F (36.9 °C)  Pulse:  [61-93] 69  Resp:  [16-24] 22  SpO2:  [93 %-99 %] 96 %  BP: ()/(48-67) 98/63     Weight: 56.4 kg (124 lb 4.8 oz)  Body mass index is 21.34 kg/m².    Intake/Output Summary (Last 24 hours) at 1/30/2023 1143  Last data filed at 1/30/2023 0500  Gross per 24 hour   Intake 1628.5 ml   Output 1770 ml   Net -141.5 ml      Physical Exam  Vitals and nursing note reviewed.   Constitutional:       Comments: Sedated on vent   HENT:      Head: Normocephalic and atraumatic.   Cardiovascular:      Rate and Rhythm: Normal rate.      Heart sounds: Normal heart sounds.   Pulmonary:      Effort: Pulmonary effort is normal.      Breath sounds: Normal breath sounds.      Comments: On vent  Abdominal:      General: Abdomen is flat.      Palpations: Abdomen is soft.       Significant Labs: All pertinent labs within the past 24 hours have been reviewed.    Significant Imaging: I have reviewed all pertinent imaging results/findings within the past 24 hours.

## 2023-01-30 NOTE — PLAN OF CARE
PATIENT IS STILL ABLE TO FOLLOW COMMANDS AND IS CALM AND COOPERATIVE. DR. BRADFORD HAS BEGAN THE PROCESS OF LOOKING INTO [facility].       Problem: Adult Inpatient Plan of Care  Goal: Plan of Care Review  Outcome: Ongoing, Progressing  Goal: Patient-Specific Goal (Individualized)  Outcome: Ongoing, Progressing  Goal: Absence of Hospital-Acquired Illness or Injury  Outcome: Ongoing, Progressing  Goal: Optimal Comfort and Wellbeing  Outcome: Ongoing, Progressing  Goal: Readiness for Transition of Care  Outcome: Ongoing, Progressing     Problem: Infection  Goal: Absence of Infection Signs and Symptoms  Outcome: Ongoing, Progressing     Problem: Communication Impairment (Mechanical Ventilation, Invasive)  Goal: Effective Communication  Outcome: Ongoing, Progressing     Problem: Device-Related Complication Risk (Mechanical Ventilation, Invasive)  Goal: Optimal Device Function  Outcome: Ongoing, Progressing     Problem: Nutrition Impairment (Mechanical Ventilation, Invasive)  Goal: Optimal Nutrition Delivery  Outcome: Ongoing, Progressing     Problem: Skin and Tissue Injury (Mechanical Ventilation, Invasive)  Goal: Absence of Device-Related Skin and Tissue Injury  Outcome: Ongoing, Progressing     Problem: Ventilator-Induced Lung Injury (Mechanical Ventilation, Invasive)  Goal: Absence of Ventilator-Induced Lung Injury  Outcome: Ongoing, Progressing     Problem: Fall Injury Risk  Goal: Absence of Fall and Fall-Related Injury  Outcome: Ongoing, Progressing     Problem: Skin Injury Risk Increased  Goal: Skin Health and Integrity  Outcome: Ongoing, Progressing     Problem: Adjustment to Illness (Sepsis/Septic Shock)  Goal: Optimal Coping  Outcome: Ongoing, Progressing     Problem: Bleeding (Sepsis/Septic Shock)  Goal: Absence of Bleeding  Outcome: Ongoing, Progressing     Problem: Glycemic Control Impaired (Sepsis/Septic Shock)  Goal: Blood Glucose Level Within Desired Range  Outcome: Ongoing, Progressing     Problem:  Infection Progression (Sepsis/Septic Shock)  Goal: Absence of Infection Signs and Symptoms  Outcome: Ongoing, Progressing     Problem: Nutrition Impaired (Sepsis/Septic Shock)  Goal: Optimal Nutrition Intake  Outcome: Ongoing, Progressing     Problem: Impaired Wound Healing  Goal: Optimal Wound Healing  Outcome: Ongoing, Progressing     Problem: Urinary Elimination Impairment  Goal: Effective Urinary Elimination/Continence  Outcome: Ongoing, Progressing

## 2023-01-31 LAB
ABS NEUT CALC (OHS): 13.19 X10(3)/MCL (ref 2.1–9.2)
ANION GAP SERPL CALC-SCNC: 5 MEQ/L (ref 2–13)
ANISOCYTOSIS BLD QL SMEAR: ABNORMAL
BASE EXCESS BLD CALC-SCNC: 6.9 MMOL/L
BLOOD GAS SAMPLE TYPE (OHS): ABNORMAL
BUN SERPL-MCNC: 19 MG/DL (ref 7–20)
CALCIUM SERPL-MCNC: 8.5 MG/DL (ref 8.4–10.2)
CHLORIDE SERPL-SCNC: 98 MMOL/L (ref 98–110)
CO2 SERPL-SCNC: 32 MMOL/L (ref 21–32)
COHGB MFR BLDA: 1.2 % (ref 0–1.5)
CREAT SERPL-MCNC: 0.53 MG/DL (ref 0.66–1.25)
CREAT/UREA NIT SERPL: 36 (ref 12–20)
ELLIPTOCYTOSIS (OHS): ABNORMAL
ERYTHROCYTE [DISTWIDTH] IN BLOOD BY AUTOMATED COUNT: 24.8 % (ref 11–14.5)
FIO2 BLOOD GAS (OHS): 30 %
GFR SERPLBLD CREATININE-BSD FMLA CKD-EPI: >90 MLS/MIN/1.73/M2
GLUCOSE SERPL-MCNC: 132 MG/DL (ref 70–115)
HCO3 BLDA-SCNC: 33.3 MMOL/L
HCT VFR BLD AUTO: 27.1 % (ref 36–48)
HGB BLD-MCNC: 8.1 GM/DL (ref 11.8–16)
HYPOCHROMIA BLD QL SMEAR: ABNORMAL
IMM GRANULOCYTES # BLD AUTO: 0.06 X10(3)/MCL (ref 0–0.03)
IMM GRANULOCYTES NFR BLD AUTO: 0.4 % (ref 0–0.5)
MCH RBC QN AUTO: 22.7 PG (ref 27–34)
MCV RBC AUTO: 75.9 FL (ref 79–99)
MEAN CELL HEMOGLOBIN CONCENTRATION (OHS) G/DL: 29.9 G/DL (ref 31–37)
MECH RR (OHS): 16 B/MIN
MECH VT (OHS): 450 ML
METHGB MFR BLDA: 0.2 % (ref 0–1.5)
MICROCYTES BLD QL SMEAR: ABNORMAL
MODE (OHS): ABNORMAL
MONOCYTES NFR BLD MANUAL: 0.41 X10(3)/MCL (ref 0.1–1.3)
MONOCYTES NFR BLD MANUAL: 3 % (ref 2–11)
NEUTROPHILS NFR BLD MANUAL: 97 % (ref 47–80)
NRBC BLD AUTO-RTO: 0 % (ref 0–1)
O2 HB BLOOD GAS (OHS): 95.2 % (ref 94–100)
OXYHGB MFR BLDA: 8.7 G/DL (ref 12–18)
PCO2 BLDA: 59 MMHG (ref 35–45)
PEEP (OHS): 5 CMH2O
PH BLDA: 7.37 [PH] (ref 7.38–7.49)
PLATELET # BLD AUTO: 322 X10(3)/MCL (ref 140–371)
PLATELET # BLD EST: ADEQUATE 10*3/UL
PMV BLD AUTO: 10.7 FL (ref 9.4–12.4)
PO2 BLDA: 90.1 MMHG (ref 80–100)
POCT GLUCOSE: 103 MG/DL (ref 70–110)
POCT GLUCOSE: 126 MG/DL (ref 70–110)
POIKILOCYTOSIS BLD QL SMEAR: ABNORMAL
POTASSIUM SERPL-SCNC: 4.2 MMOL/L (ref 3.5–5.1)
PS (OHS): 10 CMH2O
RBC # BLD AUTO: 3.57 X10(6)/MCL (ref 4–5.1)
RBC MORPH BLD: ABNORMAL
SAO2 % BLDA: 96.6 %
SODIUM SERPL-SCNC: 135 MMOL/L (ref 135–145)
TARGETS BLD QL SMEAR: ABNORMAL
WBC # SPEC AUTO: 13.6 X10(3)/MCL (ref 4–11.5)

## 2023-01-31 PROCEDURE — 36415 COLL VENOUS BLD VENIPUNCTURE: CPT | Performed by: FAMILY MEDICINE

## 2023-01-31 PROCEDURE — 94761 N-INVAS EAR/PLS OXIMETRY MLT: CPT

## 2023-01-31 PROCEDURE — 36600 WITHDRAWAL OF ARTERIAL BLOOD: CPT

## 2023-01-31 PROCEDURE — 94640 AIRWAY INHALATION TREATMENT: CPT

## 2023-01-31 PROCEDURE — 80048 BASIC METABOLIC PNL TOTAL CA: CPT | Performed by: FAMILY MEDICINE

## 2023-01-31 PROCEDURE — 99900026 HC AIRWAY MAINTENANCE (STAT)

## 2023-01-31 PROCEDURE — 82803 BLOOD GASES ANY COMBINATION: CPT

## 2023-01-31 PROCEDURE — 99900035 HC TECH TIME PER 15 MIN (STAT)

## 2023-01-31 PROCEDURE — 25000003 PHARM REV CODE 250

## 2023-01-31 PROCEDURE — 25000242 PHARM REV CODE 250 ALT 637 W/ HCPCS

## 2023-01-31 PROCEDURE — 85027 COMPLETE CBC AUTOMATED: CPT | Performed by: FAMILY MEDICINE

## 2023-01-31 PROCEDURE — 97530 THERAPEUTIC ACTIVITIES: CPT | Mod: CQ

## 2023-01-31 PROCEDURE — 94003 VENT MGMT INPAT SUBQ DAY: CPT

## 2023-01-31 PROCEDURE — 27000221 HC OXYGEN, UP TO 24 HOURS

## 2023-01-31 PROCEDURE — 20000000 HC ICU ROOM

## 2023-01-31 PROCEDURE — 63600175 PHARM REV CODE 636 W HCPCS

## 2023-01-31 PROCEDURE — 25000003 PHARM REV CODE 250: Performed by: FAMILY MEDICINE

## 2023-01-31 RX ADMIN — LEVALBUTEROL HYDROCHLORIDE 1.25 MG: 1.25 SOLUTION, CONCENTRATE RESPIRATORY (INHALATION) at 11:01

## 2023-01-31 RX ADMIN — METFORMIN HYDROCHLORIDE 500 MG: 500 TABLET, FILM COATED ORAL at 04:01

## 2023-01-31 RX ADMIN — FAMOTIDINE 20 MG: 10 INJECTION INTRAVENOUS at 09:01

## 2023-01-31 RX ADMIN — ATORVASTATIN CALCIUM 80 MG: 40 TABLET, FILM COATED ORAL at 09:01

## 2023-01-31 RX ADMIN — IPRATROPIUM BROMIDE 0.5 MG: 0.5 SOLUTION RESPIRATORY (INHALATION) at 11:01

## 2023-01-31 RX ADMIN — LEVALBUTEROL HYDROCHLORIDE 1.25 MG: 1.25 SOLUTION, CONCENTRATE RESPIRATORY (INHALATION) at 07:01

## 2023-01-31 RX ADMIN — BUDESONIDE INHALATION 0.5 MG: 0.5 SUSPENSION RESPIRATORY (INHALATION) at 07:01

## 2023-01-31 RX ADMIN — IPRATROPIUM BROMIDE 0.5 MG: 0.5 SOLUTION RESPIRATORY (INHALATION) at 07:01

## 2023-01-31 RX ADMIN — ACETAMINOPHEN 650 MG: 325 TABLET, FILM COATED ORAL at 03:01

## 2023-01-31 RX ADMIN — PREGABALIN 50 MG: 25 CAPSULE ORAL at 03:01

## 2023-01-31 RX ADMIN — OXYCODONE HYDROCHLORIDE AND ACETAMINOPHEN 500 MG: 500 TABLET ORAL at 09:01

## 2023-01-31 RX ADMIN — PROPOFOL 35 MCG/KG/MIN: 10 INJECTION, EMULSION INTRAVENOUS at 04:01

## 2023-01-31 RX ADMIN — IPRATROPIUM BROMIDE 0.5 MG: 0.5 SOLUTION RESPIRATORY (INHALATION) at 03:01

## 2023-01-31 RX ADMIN — LEVALBUTEROL HYDROCHLORIDE 1.25 MG: 1.25 SOLUTION, CONCENTRATE RESPIRATORY (INHALATION) at 03:01

## 2023-01-31 RX ADMIN — FERROUS SULFATE TAB 325 MG (65 MG ELEMENTAL FE) 1 EACH: 325 (65 FE) TAB at 09:01

## 2023-01-31 RX ADMIN — METFORMIN HYDROCHLORIDE 500 MG: 500 TABLET, FILM COATED ORAL at 09:01

## 2023-01-31 RX ADMIN — FERROUS SULFATE TAB 325 MG (65 MG ELEMENTAL FE) 1 EACH: 325 (65 FE) TAB at 03:01

## 2023-01-31 RX ADMIN — FLUTICASONE PROPIONATE 50 MCG: 50 SPRAY, METERED NASAL at 09:01

## 2023-01-31 RX ADMIN — OXYBUTYNIN CHLORIDE 5 MG: 5 TABLET, EXTENDED RELEASE ORAL at 09:01

## 2023-01-31 RX ADMIN — PREGABALIN 50 MG: 25 CAPSULE ORAL at 09:01

## 2023-01-31 RX ADMIN — POLYETHYLENE GLYCOL 3350 17 G: 17 POWDER, FOR SOLUTION ORAL at 09:01

## 2023-01-31 RX ADMIN — PROPOFOL 35 MCG/KG/MIN: 10 INJECTION, EMULSION INTRAVENOUS at 03:01

## 2023-01-31 RX ADMIN — THEOPHYLLINE 400 MG: 400 TABLET, EXTENDED RELEASE ORAL at 09:01

## 2023-01-31 NOTE — ASSESSMENT & PLAN NOTE
More p.o. and recheck levels in a.m.Patient with Paroxysmal (<7 days) atrial fibrillation which is controlled currently with Amiodarone. Patient is currently in sinus rhythm.[id number] Score: 3. HASBLED Score:  . Anticoagulation not indicated due to anemia.    Her heart rate has been stable     Rate controlled overnight on amiodarone

## 2023-01-31 NOTE — ASSESSMENT & PLAN NOTE
This patient does have evidence of infective focus  My overall impression is septic shock. Vital signs were reviewed and noted in progress note.  Antibiotics given-   Antibiotics (From admission, onward)    None        Cultures were taken-   Microbiology Results (last 7 days)     ** No results found for the last 168 hours. **        Latest lactate reviewed, they are-  No results for input(s): LACTATE in the last 72 hours.    Organ dysfunction indicated by Acute kidney injury, Encephalopathy  and Acute respiratory failure  Source-      Source control Achieved by- iv abx and This patient does have evidence of infective focus  My overall impression is septic shock.  Source:  Sputum culture from intubation is pending shows 3 organisms growing rare amounts will follow up  Antibiotics given-   Antibiotics (From admission, onward)    None        Latest lactate reviewed-  No results for input(s): LACTATE in the last 72 hours.  Organ dysfunction indicated by Acute kidney injury and Acute respiratory failure    Shock with decreased perfusion noted, Fluid challenge  was given at 30cc/kg and was not given at 30cc/kg due to      Post- resuscitation assessment- (Done after fluids given for shock)  Vital signs post fluid administrations were-  Temp Readings from Last 1 Encounters:   01/31/23 99 °F (37.2 °C) (Axillary)     BP Readings from Last 1 Encounters:   01/31/23 (!) 90/49     Pulse Readings from Last 1 Encounters:   01/31/23 83       Perfusion exam was performed within 6 hours of septic shock presentation after bolus shows Adequate tissue perfusion assessed by non-invasive monitoring  Will Start Pressors- Levophed for MAP of 65  Source control achieved by:  Iv abx   echo done showed EF of 60-65% with normal right  Ventricular function   CT angio chest done on admission did not show any PE and no acute finding     CT of the abdomen done on admit showed some small renal calculi no other acute finding    Patient remains on the  Levophed at 2.2 mils per hour which is a 0.02 milligram/kilogram per minute dose we will continue and continue wean as tolerated

## 2023-01-31 NOTE — ASSESSMENT & PLAN NOTE
Nutrition consulted. Most recent weight and BMI monitored-  Patient will have EGD and colonoscopy once stabilized from a respiratory standpoint                        Measurements:  Wt Readings from Last 1 Encounters:   01/31/23 59 kg (130 lb 1.6 oz)   Body mass index is 22.33 kg/m².    Recommendations: Recommendation/Intervention: RECOMMEND RESTART TUBE FEEDS. START DIABETISOURCE 1.2 @ 20 ML/HR. ONCE TOLERATING TITRATE SLOWLY TO GOAL RATE OF 55 ML/HR (1518 KCAL, 76 GM PRO, 1032 ML H2O). ONCE PATIENT IS TOLERATED TUBE FEEDS @ 35 ML/HR BEGIN TO WEAN PPN. RECOMMENDATIONS WITH PROPOFOL AT 6.3 ML/HR WILL PROVIDE 99% KCAL NEEDS % PROTEIN NEEDS.  Goals: TOLERATE TUBE FEEDS AT GOAL RATE OF 55 ML/HR.    Patient has been screened and assessed by RD. RD will follow patient.

## 2023-01-31 NOTE — PLAN OF CARE
Problem: Adult Inpatient Plan of Care  Goal: Plan of Care Review  Outcome: Ongoing, Progressing  Goal: Patient-Specific Goal (Individualized)  Outcome: Ongoing, Progressing  Goal: Absence of Hospital-Acquired Illness or Injury  Outcome: Ongoing, Progressing  Goal: Optimal Comfort and Wellbeing  Outcome: Ongoing, Progressing  Goal: Readiness for Transition of Care  Outcome: Ongoing, Progressing     Problem: Infection  Goal: Absence of Infection Signs and Symptoms  Outcome: Ongoing, Progressing     Problem: Communication Impairment (Mechanical Ventilation, Invasive)  Goal: Effective Communication  Outcome: Ongoing, Progressing     Problem: Device-Related Complication Risk (Mechanical Ventilation, Invasive)  Goal: Optimal Device Function  Outcome: Ongoing, Progressing     Problem: Nutrition Impairment (Mechanical Ventilation, Invasive)  Goal: Optimal Nutrition Delivery  Outcome: Ongoing, Progressing     Problem: Skin and Tissue Injury (Mechanical Ventilation, Invasive)  Goal: Absence of Device-Related Skin and Tissue Injury  Outcome: Ongoing, Progressing     Problem: Ventilator-Induced Lung Injury (Mechanical Ventilation, Invasive)  Goal: Absence of Ventilator-Induced Lung Injury  Outcome: Ongoing, Progressing     Problem: Fall Injury Risk  Goal: Absence of Fall and Fall-Related Injury  Outcome: Ongoing, Progressing

## 2023-01-31 NOTE — PROGRESS NOTES
[facility] Medicine  Progress Note    Patient Name: Michel De La Paz  MRN: [id number]  Patient Class: IP- Inpatient   Admission Date: 1/13/2023  Length of Stay: 18 days  Attending Physician: Ac Childers MD  Primary Care Provider: WESLY Best        Subjective:     Principal Problem:Acute respiratory failure with hypoxia and hypercarbia        HPI:   66 yo F admitted overnight with worsening weakenss, sob and tachycardia. H/  H low initially and got 2 U pRBC overnight. H/H stable this and trend up.  EKG atrial fibrillation with rvr better.            Overview/Hospital Course:   66 yo F admitted overnight with worsening weakenss, sob and tachycardia. H/  H low initially and got 2 U pRBC overnight. H/H stable this and trend up.  EKG atrial fibrillation with rvr better.    01/15   HR better controlled overnight on amiodarone increased TID    01/16 c/o constipation overngiht and this AM  iron low on panel  HR stable overnight  01/17/2023 pt c/o no bm x 2 weeks.  H&H has been stable  01/18/2023 denies pain, no BM yet, H&H stable, very weak and unsteady gait, working with PT.  01/19/2023 pt had large BM today, still very weak, got SOB overnight placed on 7L oximizer due to drop in oxygen sats.  Echo EF 55-60% and CXR shows pulmonary edema.  Pt c/o feeling weak.  Surgery plans for EGD in am.  pt has had recent weight loss about 4 kng since April, was 58 kg now 54, BMI down to 20.  01/20/2023 pt more obtunded overnight this am with increased weakness and not following commands, stat ABG done showed PCO2 elevated and acute respiratory acidosis, transferred pt to ICU and placed on BIPAP, 1 hr ABG very minimal improvement and pt GSC of 6 low with minimally responsive withdraws to pain, no verbal responses  01/21/2023 pt stable on vent overnight, abg this am looks good, will start to wean, CXR no infiltrates, blood and urine cx repeated and pending.     01/22/2023 patient remained  stable on the vent were able to wean her rate a bit however her pCO2 did go up to the 80s ABG is still reasonable at 7.3  but with pCO2 very high in the 80  we put him back on AC with a rate of 16  01/23/2023 patient stable overnight her ABG actually looks better this a.m. her pCO2 has normalized after we switched her back to AC with a rate is 16 she is tolerating currently her FiO2 is at 50% respiratory rate is 20 she is still on the Levophed at 13.5 mics and getting Clinimix at 40 and propofol at 20  01/24/2023 tolerating ventilator wean overnight FiO2 50% down  01/25/2023 FiO2 down to 40% this AM -assist control  Awake and alert on vent minimal sedation  01/26/2023 stable on simv overnight but PCO2 trending up to 56.2 off AC mode  Awakening with sedation down  Cxr stable   Blood counts improved  01/27/2023 extubated this AM tolerated for short time  Developed acute respiratory distress O2 sats 60's minimally responive   Reintubated by sheesia  01/28/2023 abg stable on simv this Am   AWAKE AND ALERT  No more cough  Pain better   Tube feeds for the rec's talked to ivelisse will start slow  Not tolerated 1st time again  01/29/2023  No major change overnight remain on simv 30%   Abg ok this AM   01/30/2023  Unable to wean vent overnight  Tolerating tube feeds  01/31/2023 pt was extubated on 01/27, did not tolerate, unable to wean in last several days, working on LTAC placement.  Still on levophed 2.2 mcg, no other changes.  Feeding currently at 40 goal is 55.   Still on TPN, dietary recs are followed.  Still on diprivan at 35      Interval History:      Review of Systems  Pt sedated unable to answer  Objective:     Vital Signs (Most Recent):  Temp: 99 °F (37.2 °C) (01/31/23 0707)  Pulse: 83 (01/31/23 0716)  Resp: 16 (01/31/23 0716)  BP: (!) 90/49 (01/31/23 0530)  SpO2: 100 % (01/31/23 0716)   Vital Signs (24h Range):  Temp:  [97.8 °F (36.6 °C)-101.1 °F (38.4 °C)] 99 °F (37.2 °C)  Pulse:  [] 83  Resp:  [16-28]  16  SpO2:  [89 %-100 %] 100 %  BP: ()/(49-72) 90/49     Weight: 59 kg (130 lb 1.6 oz)  Body mass index is 22.33 kg/m².    Intake/Output Summary (Last 24 hours) at 1/31/2023 0928  Last data filed at 1/31/2023 0500  Gross per 24 hour   Intake 2907.17 ml   Output 2000 ml   Net 907.17 ml      Physical Exam  Vitals and nursing note reviewed.   Constitutional:       Comments: Sedated on vent   HENT:      Head: Normocephalic and atraumatic.   Cardiovascular:      Rate and Rhythm: Normal rate.      Heart sounds: Normal heart sounds.   Pulmonary:      Effort: Pulmonary effort is normal. On vent, few rhonchi on right, mostly clear     Breath sounds: Normal breath sounds.      Comments: On vent  Abdominal:      General: Abdomen is flat.      Palpations: Abdomen is soft.           Significant Labs: All pertinent labs within the past 24 hours have been reviewed.  ABGs: No results for input(s): PH, PCO2, HCO3, POCSATURATED, BE, TOTALHB, COHB, METHB, O2HB, POCFIO2, PO2 in the last 48 hours.  BMP:   Recent Labs   Lab 01/31/23  0411      K 4.2   CO2 32   BUN 19.0   CREATININE 0.53*   CALCIUM 8.5     CBC:   Recent Labs   Lab 01/30/23  0347 01/31/23  0411   WBC 7.4 13.6*   HGB 7.8* 8.1*   HCT 26.7* 27.1*    322       Significant Imaging: I have reviewed all pertinent imaging results/findings within the past 24 hours.         Assessment/Plan:      * Acute respiratory failure with hypoxia and hypercarbia  Patient with Hypercapnic and Hypoxic Respiratory failure which is Acute on chronic.  she is not on home oxygen. Supplemental oxygen was provided and noted- Vent Mode: SIMV  Oxygen Concentration (%):  [30] 30  Resp Rate Total:  [16 br/min-32 br/min] 16 br/min  Vt Set:  [450 mL] 450 mL  PEEP/CPAP:  [5 cmH20] 5 cmH20  Pressure Support:  [10 cmH20] 10 cmH20  Mean Airway Pressure:  [10 ajQ32-76 cmH20] 12 cmH20.   Signs/symptoms of respiratory failure include- tachypnea, increased work of breathing and respiratory  distress. Contributing diagnoses includes - CHF and COPD Labs and images were reviewed. Patient Has recent ABG, which has been reviewed. Will treat underlying causes and adjust management of respiratory failure as follows-     She will remain on the AC, get our case management consult to attempt for LTAC placement continue all other meds and treatment including her leave albuterol nebs IV steroids IV antibiotics follow up on cultures as they report         Failed extubation  01/27   Stable on SIMV  Case managemeent working on  LTAC for ventilator weaning  Wean vent as toelrated    Severe sepsis with septic shock  This patient does have evidence of infective focus  My overall impression is septic shock. Vital signs were reviewed and noted in progress note.  Antibiotics given-   Antibiotics (From admission, onward)      None          Cultures were taken-   Microbiology Results (last 7 days)       ** No results found for the last 168 hours. **          Latest lactate reviewed, they are-  No results for input(s): LACTATE in the last 72 hours.    Organ dysfunction indicated by Acute kidney injury, Encephalopathy  and Acute respiratory failure  Source-      Source control Achieved by- iv abx and This patient does have evidence of infective focus  My overall impression is septic shock.  Source:  Sputum culture from intubation is pending shows 3 organisms growing rare amounts will follow up  Antibiotics given-   Antibiotics (From admission, onward)      None          Latest lactate reviewed-  No results for input(s): LACTATE in the last 72 hours.  Organ dysfunction indicated by Acute kidney injury and Acute respiratory failure    Shock with decreased perfusion noted, Fluid challenge  was given at 30cc/kg and was not given at 30cc/kg due to      Post- resuscitation assessment- (Done after fluids given for shock)  Vital signs post fluid administrations were-  Temp Readings from Last 1 Encounters:   01/31/23 99 °F (37.2 °C)  (Axillary)     BP Readings from Last 1 Encounters:   01/31/23 (!) 90/49     Pulse Readings from Last 1 Encounters:   01/31/23 83       Perfusion exam was performed within 6 hours of septic shock presentation after bolus shows Adequate tissue perfusion assessed by non-invasive monitoring  Will Start Pressors- Levophed for MAP of 65  Source control achieved by:  Iv abx   echo done showed EF of 60-65% with normal right  Ventricular function   CT angio chest done on admission did not show any PE and no acute finding     CT of the abdomen done on admit showed some small renal calculi no other acute finding    Patient remains on the Levophed at 2.2 mils per hour which is a 0.02 milligram/kilogram per minute dose we will continue and continue wean as tolerated        Hypokalemia    Her potassium is improved 4.2 today    Atrial fibrillation with RVR  More p.o. and recheck levels in a.m.Patient with Paroxysmal (<7 days) atrial fibrillation which is controlled currently with Amiodarone. Patient is currently in sinus rhythm.[id number] Score: 3. HASBLED Score:  . Anticoagulation not indicated due to anemia.    Her heart rate has been stable     Rate controlled overnight on amiodarone    Microcytic anemia   Patient was scheduled for EGD and had a transfusion at the time of admission here however EGD was postponed due to acute respiratory failure with hypercarbia requiring intubation she will follow up with Dr. Abel once he is more stable  H&H remains stable will continue to monitor  8/28 has been stable    Hematuria   Recently been worked up by Urology has some small stones family reports other workup was negative including what sounds like a endoscopy or bladder scope      Transaminitis   Improved        Recent weight loss  Nutrition consulted. Most recent weight and BMI monitored-  Patient will have EGD and colonoscopy once stabilized from a respiratory standpoint                        Measurements:  Wt Readings from  Last 1 Encounters:   01/31/23 59 kg (130 lb 1.6 oz)   Body mass index is 22.33 kg/m².    Recommendations: Recommendation/Intervention: RECOMMEND RESTART TUBE FEEDS. START DIABETISOURCE 1.2 @ 20 ML/HR. ONCE TOLERATING TITRATE SLOWLY TO GOAL RATE OF 55 ML/HR (1518 KCAL, 76 GM PRO, 1032 ML H2O). ONCE PATIENT IS TOLERATED TUBE FEEDS @ 35 ML/HR BEGIN TO WEAN PPN. RECOMMENDATIONS WITH PROPOFOL AT 6.3 ML/HR WILL PROVIDE 99% KCAL NEEDS % PROTEIN NEEDS.  Goals: TOLERATE TUBE FEEDS AT GOAL RATE OF 55 ML/HR.    Patient has been screened and assessed by RD. RD will follow patient.        VTE Risk Mitigation (From admission, onward)           Ordered     Place sequential compression device  Until discontinued         01/21/23 1326                    Discharge Planning   LASHAWN:      Code Status: Full Code   Is the patient medically ready for discharge?:     Reason for patient still in hospital (select all that apply): Patient new problem, Patient trending condition, Laboratory test and Treatment               Critical care 40 minute      Megan Iglesias MD  Department of Hospital Medicine   [facility]

## 2023-01-31 NOTE — PROGRESS NOTES
[facility]    Patient Name:  Michel De La Paz   MRN:  [id number]  Date: 1/31/2023  Diagnosis: Acute respiratory failure with hypoxia and hypercarbia    History:     Past Medical History:   Diagnosis Date    CHF (congestive heart failure)     COPD (chronic obstructive pulmonary disease)     Diverticular disease of large intestine without perforation or abscess     DM (diabetes mellitus)     GERD (gastroesophageal reflux disease)     Mild intermittent asthma, uncomplicated     Neuropathy     Sleep apnea        Social History     Socioeconomic History    Marital status: Unknown   Tobacco Use    Smoking status: Never   Substance and Sexual Activity    Alcohol use: Never       Precautions:     Allergies as of 01/21/2023 - Reviewed 01/21/2023   Allergen Reaction Noted    Benadryl [diphenhydramine hcl]  01/21/2023    Darvocet a500 [propoxyphene n-acetaminophen]  01/21/2023       WOC Assessment Details/Treatment            01/31/23 1019   WOCN Assessment   WOCN Total Time (mins) 20   Visit Date 01/31/23   Visit Time 1015   Consult Type Follow Up   WOCN Speciality Wound   Wound pressure   Number of Wounds 1   Intervention assessed;changed;applied   Skin Interventions   Pressure Reduction Techniques heels elevated off bed;positioned off wounds;weight shift assistance provided   Positioning   Body Position side-lying   Head of Bed (HOB) Positioning HOB at 30-45 degrees   Positioning/Transfer Devices pillows  (tortoise in place)   Pressure Injury Prevention    Check Moisture Management Pad Done   Heel protection technique Pillow        Altered Skin Integrity 01/23/23 0530 Right Coccyx #1 Purple or maroon localized area of discolored intact skin or non-intact skin or a blood-filled blister.   Date First Assessed/Time First Assessed: 01/23/23 0530   Altered Skin Integrity Present on Admission: suspected hospital acquired  Side: Right  Location: Coccyx  Wound Number: #1  Is this injury device related?:  No  Description of Altered Skin Integri...   Wound Image     Description of Altered Skin Integrity Purple or maroon localized area of discolored intact skin or non-intact skin or a blood-filled blister.   Dressing Appearance Dry;Intact;Clean   Drainage Amount None   Appearance Intact;Purple;Maroon   Tissue loss description Not applicable   Periwound Area Redness   Wound Edges Defined   Wound Length (cm) 9.5 cm   Wound Width (cm) 10 cm   Wound Depth (cm) 0 cm   Wound Volume (cm^3) 0 cm^3   Wound Surface Area (cm^2) 95 cm^2   Care Cleansed with:;Sterile normal saline;Applied:;Skin Barrier   Dressing Applied;Foam   Dressing Change Due 02/03/23 01/31/2023

## 2023-01-31 NOTE — PLAN OF CARE
Problem: Adult Inpatient Plan of Care  Goal: Plan of Care Review  Outcome: Ongoing, Progressing  Goal: Patient-Specific Goal (Individualized)  Outcome: Ongoing, Progressing  Goal: Absence of Hospital-Acquired Illness or Injury  Outcome: Ongoing, Progressing  Goal: Optimal Comfort and Wellbeing  Outcome: Ongoing, Progressing  Goal: Readiness for Transition of Care  Outcome: Ongoing, Progressing     Problem: Infection  Goal: Absence of Infection Signs and Symptoms  Outcome: Ongoing, Progressing     Problem: Communication Impairment (Mechanical Ventilation, Invasive)  Goal: Effective Communication  Outcome: Ongoing, Progressing     Problem: Device-Related Complication Risk (Mechanical Ventilation, Invasive)  Goal: Optimal Device Function  Outcome: Ongoing, Progressing     Problem: Nutrition Impairment (Mechanical Ventilation, Invasive)  Goal: Optimal Nutrition Delivery  Outcome: Ongoing, Progressing     Problem: Skin and Tissue Injury (Mechanical Ventilation, Invasive)  Goal: Absence of Device-Related Skin and Tissue Injury  Outcome: Ongoing, Progressing     Problem: Ventilator-Induced Lung Injury (Mechanical Ventilation, Invasive)  Goal: Absence of Ventilator-Induced Lung Injury  Outcome: Ongoing, Progressing     Problem: Fall Injury Risk  Goal: Absence of Fall and Fall-Related Injury  Outcome: Ongoing, Progressing     Problem: Skin Injury Risk Increased  Goal: Skin Health and Integrity  Outcome: Ongoing, Progressing     Problem: Adjustment to Illness (Sepsis/Septic Shock)  Goal: Optimal Coping  Outcome: Ongoing, Progressing     Problem: Bleeding (Sepsis/Septic Shock)  Goal: Absence of Bleeding  Outcome: Ongoing, Progressing     Problem: Glycemic Control Impaired (Sepsis/Septic Shock)  Goal: Blood Glucose Level Within Desired Range  Outcome: Ongoing, Progressing     Problem: Infection Progression (Sepsis/Septic Shock)  Goal: Absence of Infection Signs and Symptoms  Outcome: Ongoing, Progressing     Problem:  Nutrition Impaired (Sepsis/Septic Shock)  Goal: Optimal Nutrition Intake  Outcome: Ongoing, Progressing     Problem: Impaired Wound Healing  Goal: Optimal Wound Healing  Outcome: Ongoing, Progressing     Problem: Urinary Elimination Impairment  Goal: Effective Urinary Elimination/Continence  Outcome: Ongoing, Progressing

## 2023-01-31 NOTE — PT/OT/SLP PROGRESS
Physical Therapy Treatment    Patient Name:  Michel De La Paz   MRN:  [id number]    Recommendations:     Discharge Recommendations: nursing facility, skilled, rehabilitation facility, LTACH (long-term acute care hospital)  Discharge Equipment Recommendations:    Barriers to discharge: None    Assessment:     Michel De La Paz is a 67 y.o. female admitted with a medical diagnosis of Acute respiratory failure with hypoxia and hypercarbia.  She presents with the following impairments/functional limitations: weakness, impaired endurance, impaired functional mobility, impaired balance .    Rehab Prognosis: Fair; patient would benefit from acute skilled PT services to address these deficits and reach maximum level of function.    Recent Surgery: * No surgery found *      Plan:     During this hospitalization, patient to be seen daily to address the identified rehab impairments via therapeutic exercises and progress toward the following goals:    Plan of Care Expires:       Subjective     Chief Complaint: overall weakness with bed mob.  Patient/Family Comments/goals: improve strength for bed mob and tf.  Pain/Comfort:         Objective:     Communicated with nsg prior to session.  Patient found supine with bed alarm, blood pressure cuff, waterman catheter, oxygen, ventilator upon PT entry to room.     General Precautions: Standard,    Orthopedic Precautions:    Braces:    Respiratory Status: Nasal cannula, flow 2 L/min     Functional Mobility:        AM-PAC 6 CLICK MOBILITY  Turning over in bed (including adjusting bedclothes, sheets and blankets)?: 1  Sitting down on and standing up from a chair with arms (e.g., wheelchair, bedside commode, etc.): 1  Moving from lying on back to sitting on the side of the bed?: 1  Moving to and from a bed to a chair (including a wheelchair)?: 1  Need to walk in hospital room?: 1  Climbing 3-5 steps with a railing?: 1  Basic Mobility Total Score: 6       Treatment & Education:  ASHLIE to  bilateral UE/LE's to improve mob and transfers.    Patient left supine with all lines intact, call button in reach, and bed alarm on..    GOALS:   Multidisciplinary Problems       Physical Therapy Goals          Problem: Physical Therapy    Goal Priority Disciplines Outcome Goal Variances Interventions   Physical Therapy Goal     PT, PT/OT Ongoing, Progressing     Description: Goals to be met by: discharge     Patient will increase functional independence with mobility by performin. Supine to sit with MInimal Assistance                         Time Tracking:     PT Received On: 23  PT Start Time: 0645     PT Stop Time: 0700  PT Total Time (min): 15 min     Billable Minutes: Therapeutic Activity 15    Treatment Type: Treatment  PT/PTA: PTA     PTA Visit Number: 2     2023

## 2023-01-31 NOTE — PROGRESS NOTES
Inpatient Nutrition Assessment    Admit Date: 1/13/2023   Total duration of encounter: 18 days     Nutrition Recommendation/Prescription       CHANGED DIABETISOURCE 1.2 TO 50 ML/HR (1380 KCAL, 69 GM PRO, 938 ML H2O) W/ FWF @ 20 ML/HR.     DUE TO PATIENT TOLERATING TUBE FEEDS AND RECEIVING PROPOFOL @ 11 ML/HR. RECOMMEND WEAN PPN.        THANK YOU.    Communication of Recommendations: reviewed with nurse    Nutrition Assessment     Malnutrition Assessment/Nutrition-Focused Physical Exam    NOT APPROPRIATE AT THIS TIME.    Chart Review    Reason Seen: follow-up    Malnutrition Screening Tool Results   Have you recently lost weight without trying?: Unsure  Have you been eating poorly because of a decreased appetite?: No   MST Score: 2     Diagnosis:  ACUTE RESPIRATORY FAILURE WITH HYPOXIA AND HYPERCARBIA, SEVERE SEPSIS WITH SEPTIC SHOCK, RECENT WEIGHT LOSS, POST-RESUSCITATION ASSESSMENT, HYPOKALEMIA, A.FIB W/ RVR,  MICROCYTIC ANEMIA, HEMATURIA, TRANSAMINITIS, RECENT WEIGHT LOSS.     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     Nutrition-Related Medications: VITAMIN C, FERROUS SULFATE, METFORMIN, LEVOPHED.   Calorie Containing IV Medications: Diprivan @ 11 ml/hr (provides 290 kcal/d) and Clinimix    Nutrition-Related Labs:  HGB- 8.1L, HCT- 27.1L, MCV- 75.9L, MCH- 22.7L, Cr- 0.53L, EGFR- >90, Glucose- 132H, POC Glucose- 114,124,126.    Diet/PN Order: Diet NPO  amino acid 4.25% in dextrose 5% w/lytes (CLINIMIX-E) solution with additives (1L provides 42.5 gm AA, 50 gm CHO (170 kcal/L dextrose), Na 35, K 30, Mg 5, Ca 4.5, Acetate 70, Cl 39, Phos 15)  amino acid 4.25% in dextrose 5% w/lytes (CLINIMIX-E) solution with additives (1L provides 42.5 gm AA, 50 gm CHO (170 kcal/L dextrose), Na 35, K 30, Mg 5, Ca 4.5, Acetate 70, Cl 39, Phos 15)  Oral Supplement Order: none  Tube Feeding Order:  Diabetisource 1.2  @ 50 ml/hr w/ FWF @ 20 ml/hr. (see below for calculation)  Appetite/Oral Intake: NPO/NPO  Factors Affecting Nutritional Intake: NPO and on mechanical ventilation  Food/Latter day/Cultural Preferences: none reported  Food Allergies: no known food allergies       Wound(s):      Altered Skin Integrity 01/23/23 0530 Right Coccyx #1 Purple or maroon localized area of discolored intact skin or non-intact skin or a blood-filled blister.-Tissue loss description: Not applicable     Comments    01/24/2023 tolerating ventilator wean overnight FiO2 50% down    01/25/2023 FiO2 down to 40% this AM -assist control  Awake and alert on vent minimal sedation    01/26/2023 stable on simv overnight but PCO2 trending up to 56.2 off AC mode  Awakening with sedation down  Cxr stable   Blood counts improved    01/27/2023 extubated this AM tolerated for short time  Developed acute respiratory distress O2 sats 60's minimally responive   Reintubated by anthesia    01/28/2023 abg stable on simv this Am   AWAKE AND ALERT  No more cough  Pain better   Tube feeds for the rec's talked to ivelisse will start slow  Not tolerated 1st time again    01/29/2023  No major change   overnight remain on simv 30%   Abg ok this AM     01/30/2023  Unable to wean vent overnight  Tolerating tube feeds    01/31/2023 pt was extubated on 01/27, did not tolerate, unable to wean in last several days, working on LTAC placement.    Still on levophed 2.2 mcg, no other changes.  Feeding currently at 40 goal is 55.   Still on TPN, dietary recs are followed.  Still on diprivan at 35    (1/27): PER NURSE: PATIENT HAD A BM RECENTLY IT WAS LIKELY PATIENT WAS BLOCKED UP ON FIRST ROUND OF TUBE FEEDS WHICH IS WHY PATIENT DID NOT TOLERATE.   PATIENT ALSO WAS EXTUBATED 1/27/2023 AND WAS RE-INTUBATED D/T FAILED EXTUBATION.     (1/31): PATIENT TOLERATED TF @ 55 ML/HR, RECEIVING PPN @ 70 ML/HR, AND PROPOFOL @ 11 ML/HR. NO C/O ABDOMINAL PAIN. INSURANCE DENIED LTAC D/T LEVOPHED.  "    Anthropometrics    Height: 5' 4" (162.6 cm)    Last Weight: 59 kg (130 lb 1.6 oz) (01/31/23 0230) Weight Method: Bed Scale  BMI (Calculated): 22.3  BMI Classification: normal (BMI 18.5-24.9)     Ideal Body Weight (IBW), Female: 120 lb     % Ideal Body Weight, Female (lb): 96.82 %                             Usual Weight Provided By: unable to obtain usual weight    Wt Readings from Last 5 Encounters:   01/31/23 59 kg (130 lb 1.6 oz)   10/22/21 60.1 kg (132 lb 7.9 oz)     Weight Change(s) Since Admission:  Admit Weight: 52.7 kg (116 lb 2.9 oz) (01/21/23 0832)  (1/27): PATIENT HAS GAINED 16.3# (14.5%) SINCE 1/23/23. WEIGHT GAIN LIKELY RELATED TO ABDOMEN DISTENTION DUE TO MINIMAL ENERGY INTAKE.   (1/31): PATIENT HAS NOW GAINED 13.86# (11.9%) SINCE 1/21. OVERALL WEIGHT CHANGE LIKELY DUE TO FLUID RETENTION/REMOVAL AND WELL NOURISHED D/T EN/PN.   Estimated Needs    Weight Used For Calorie Calculations: 58.5 kg (128 lb 15.5 oz)  Energy Calorie Requirements (kcal): 0979-5382 KCAL (26-30 KCAL/KG CBW)  Energy Need Method: Kcal/kg  Weight Used For Protein Calculations: 58.5 kg (128 lb 15.5 oz)  Protein Requirements: 64-76 GM PRO (1.1-1.3 GM/KG CBW)  Fluid Requirements (mL): 1465 ML H2O (25 ML/KG CBW)  Temp: 99.2 °F (37.3 °C)       Enteral Nutrition    Formula: Diabetisource AC  Rate/Volume: 50 ML/HR  Water Flushes: 20 ML/HR  Additives/Modulars: none at this time  Route: orogastric tube  Method: continuous  Total Nutrition Provided by Tube Feeding, Additives, and Flushes:  Calories Provided  1380 kcal/d, 90% needs   Protein Provided  69 g/d, 100% needs   Fluid Provided  1398 ml/d, 95% needs   Continuous feeding calculations based on estimated 20 hr/d run time unless otherwise stated.    Parenteral Nutrition    Standard Formula: Clinimix 4.25/5  Custom Formula: not applicable  Additives: trace elements (Zn, Cu, Mn, Se)  Rate/Volume: 70 ML/HR  Lipids: none  Total Nutrition Provided by Parenteral Nutrition:  Calories Provided  " 571 kcal/d, 38% needs   Protein Provided  71 g/d, 100% needs   Dextrose Provided   g/d, GIR  mg CHO/kg/min   Fluid Provided  1680 ml/d, 115% needs       Evaluation of Received Nutrient Intake    Calories: meeting estimated needs  Protein: meeting estimated needs    Patient Education    Not applicable.    Nutrition Diagnosis     PES: Inadequate energy intake related to DECREASED ABILITY TO CONSUME SUFFICIENT ENERGY as evidenced by CONDITIONS ASSOCIATED WITH DIAGNOSIS. (resolved)  PES: Excessive energy intake related to overfeeding of parenteral/enteral nutrition as evidenced by intake of energy in excess of estimated needs. (new)      Interventions/Goals     Intervention(s): modified rate of enteral nutrition, modified rate of parenteral nutrition, multivitamin/mineral supplement therapy, and collaboration with other providers  Goal: Tolerate enteral feeding at goal rate by follow-up. (goal met)    Monitoring & Evaluation     Dietitian will monitor energy intake, enteral nutrition intake, parenteral nutrition intake, weight, weight change, glucose/endocrine profile, and gastrointestinal profile.  Nutrition Risk/Follow-Up: moderate (follow-up in 3-5 days)   Please consult if re-assessment needed sooner.

## 2023-01-31 NOTE — SUBJECTIVE & OBJECTIVE
Interval History:      Review of Systems  Pt sedated unable to answer  Objective:     Vital Signs (Most Recent):  Temp: 99 °F (37.2 °C) (01/31/23 0707)  Pulse: 83 (01/31/23 0716)  Resp: 16 (01/31/23 0716)  BP: (!) 90/49 (01/31/23 0530)  SpO2: 100 % (01/31/23 0716)   Vital Signs (24h Range):  Temp:  [97.8 °F (36.6 °C)-101.1 °F (38.4 °C)] 99 °F (37.2 °C)  Pulse:  [] 83  Resp:  [16-28] 16  SpO2:  [89 %-100 %] 100 %  BP: ()/(49-72) 90/49     Weight: 59 kg (130 lb 1.6 oz)  Body mass index is 22.33 kg/m².    Intake/Output Summary (Last 24 hours) at 1/31/2023 0928  Last data filed at 1/31/2023 0500  Gross per 24 hour   Intake 2907.17 ml   Output 2000 ml   Net 907.17 ml      Physical Exam  Vitals and nursing note reviewed.   Constitutional:       Comments: Sedated on vent   HENT:      Head: Normocephalic and atraumatic.   Cardiovascular:      Rate and Rhythm: Normal rate.      Heart sounds: Normal heart sounds.   Pulmonary:      Effort: Pulmonary effort is normal. On vent, few rhonchi on right, mostly clear     Breath sounds: Normal breath sounds.      Comments: On vent  Abdominal:      General: Abdomen is flat.      Palpations: Abdomen is soft.           Significant Labs: All pertinent labs within the past 24 hours have been reviewed.  ABGs: No results for input(s): PH, PCO2, HCO3, POCSATURATED, BE, TOTALHB, COHB, METHB, O2HB, POCFIO2, PO2 in the last 48 hours.  BMP:   Recent Labs   Lab 01/31/23  0411      K 4.2   CO2 32   BUN 19.0   CREATININE 0.53*   CALCIUM 8.5     CBC:   Recent Labs   Lab 01/30/23  0347 01/31/23  0411   WBC 7.4 13.6*   HGB 7.8* 8.1*   HCT 26.7* 27.1*    322       Significant Imaging: I have reviewed all pertinent imaging results/findings within the past 24 hours.

## 2023-01-31 NOTE — ASSESSMENT & PLAN NOTE
Patient with Hypercapnic and Hypoxic Respiratory failure which is Acute on chronic.  she is not on home oxygen. Supplemental oxygen was provided and noted- Vent Mode: SIMV  Oxygen Concentration (%):  [30] 30  Resp Rate Total:  [16 br/min-32 br/min] 16 br/min  Vt Set:  [450 mL] 450 mL  PEEP/CPAP:  [5 cmH20] 5 cmH20  Pressure Support:  [10 cmH20] 10 cmH20  Mean Airway Pressure:  [10 qvO22-46 cmH20] 12 cmH20.   Signs/symptoms of respiratory failure include- tachypnea, increased work of breathing and respiratory distress. Contributing diagnoses includes - CHF and COPD Labs and images were reviewed. Patient Has recent ABG, which has been reviewed. Will treat underlying causes and adjust management of respiratory failure as follows-     She will remain on the AC, get our case management consult to attempt for LTAC placement continue all other meds and treatment including her leave albuterol nebs IV steroids IV antibiotics follow up on cultures as they report         Failed extubation  01/27   Stable on SIMV  Case managemeent working on  LTAC for ventilator weaning  Wean vent as toelrated

## 2023-01-31 NOTE — ASSESSMENT & PLAN NOTE
Patient was scheduled for EGD and had a transfusion at the time of admission here however EGD was postponed due to acute respiratory failure with hypercarbia requiring intubation she will follow up with Dr. Abel once he is more stable  H&H remains stable will continue to monitor  8/28 has been stable

## 2023-02-01 LAB
ABS NEUT CALC (OHS): 9.88 X10(3)/MCL (ref 2.1–9.2)
ANION GAP SERPL CALC-SCNC: 1 MEQ/L (ref 2–13)
ANISOCYTOSIS BLD QL SMEAR: ABNORMAL
BASE EXCESS BLD CALC-SCNC: 9.1 MMOL/L
BLOOD GAS SAMPLE TYPE (OHS): ABNORMAL
BUN SERPL-MCNC: 24 MG/DL (ref 7–20)
CALCIUM SERPL-MCNC: 8.1 MG/DL (ref 8.4–10.2)
CHLORIDE SERPL-SCNC: 99 MMOL/L (ref 98–110)
CO2 SERPL-SCNC: 35 MMOL/L (ref 21–32)
CREAT SERPL-MCNC: 0.52 MG/DL (ref 0.66–1.25)
CREAT/UREA NIT SERPL: 46 (ref 12–20)
ELLIPTOCYTOSIS (OHS): ABNORMAL
ERYTHROCYTE [DISTWIDTH] IN BLOOD BY AUTOMATED COUNT: 25.6 % (ref 11–14.5)
FIO2 BLOOD GAS (OHS): 30 %
GFR SERPLBLD CREATININE-BSD FMLA CKD-EPI: >90 MLS/MIN/1.73/M2
GLUCOSE SERPL-MCNC: 110 MG/DL (ref 70–115)
HCO3 BLDA-SCNC: 34.9 MMOL/L
HCT VFR BLD AUTO: 26.7 % (ref 36–48)
HGB BLD-MCNC: 7.8 GM/DL (ref 11.8–16)
HYPOCHROMIA BLD QL SMEAR: ABNORMAL
IMM GRANULOCYTES # BLD AUTO: 0.05 X10(3)/MCL (ref 0–0.03)
IMM GRANULOCYTES NFR BLD AUTO: 0.5 % (ref 0–0.5)
LYMPHOCYTES NFR BLD MANUAL: 0.21 X10(3)/MCL
LYMPHOCYTES NFR BLD MANUAL: 2 % (ref 13–40)
MCH RBC QN AUTO: 22.3 PG (ref 27–34)
MCV RBC AUTO: 76.3 FL (ref 79–99)
MEAN CELL HEMOGLOBIN CONCENTRATION (OHS) G/DL: 29.2 G/DL (ref 31–37)
MECH RR (OHS): 16 B/MIN
MECH VT (OHS): 450 ML
MICROCYTES BLD QL SMEAR: ABNORMAL
MODE (OHS): ABNORMAL
MONOCYTES NFR BLD MANUAL: 0.31 X10(3)/MCL (ref 0.1–1.3)
MONOCYTES NFR BLD MANUAL: 3 % (ref 2–11)
NEUTROPHILS NFR BLD MANUAL: 95 % (ref 47–80)
NRBC BLD AUTO-RTO: 0 % (ref 0–1)
PCO2 BLDA: 56.6 MMHG (ref 35–45)
PEEP (OHS): 5 CMH2O
PH BLDA: 7.4 [PH] (ref 7.38–7.49)
PLATELET # BLD AUTO: 194 X10(3)/MCL (ref 140–371)
PLATELET # BLD EST: ADEQUATE 10*3/UL
PMV BLD AUTO: 11.2 FL (ref 9.4–12.4)
PO2 BLDA: 67.2 MMHG (ref 80–100)
POCT GLUCOSE: 111 MG/DL (ref 70–110)
POCT GLUCOSE: 120 MG/DL (ref 70–110)
POCT GLUCOSE: 124 MG/DL (ref 70–110)
POCT GLUCOSE: 135 MG/DL (ref 70–110)
POIKILOCYTOSIS BLD QL SMEAR: ABNORMAL
POTASSIUM SERPL-SCNC: 4.1 MMOL/L (ref 3.5–5.1)
PS (OHS): 10 CMH2O
RBC # BLD AUTO: 3.5 X10(6)/MCL (ref 4–5.1)
RBC MORPH BLD: ABNORMAL
SAO2 % BLDA: 92.6 %
SODIUM SERPL-SCNC: 135 MMOL/L (ref 135–145)
TARGETS BLD QL SMEAR: ABNORMAL
WBC # SPEC AUTO: 10.4 X10(3)/MCL (ref 4–11.5)

## 2023-02-01 PROCEDURE — 94640 AIRWAY INHALATION TREATMENT: CPT

## 2023-02-01 PROCEDURE — 25000003 PHARM REV CODE 250

## 2023-02-01 PROCEDURE — 85025 COMPLETE CBC W/AUTO DIFF WBC: CPT | Performed by: FAMILY MEDICINE

## 2023-02-01 PROCEDURE — 80048 BASIC METABOLIC PNL TOTAL CA: CPT | Performed by: FAMILY MEDICINE

## 2023-02-01 PROCEDURE — 85027 COMPLETE CBC AUTOMATED: CPT | Performed by: FAMILY MEDICINE

## 2023-02-01 PROCEDURE — 25000003 PHARM REV CODE 250: Performed by: FAMILY MEDICINE

## 2023-02-01 PROCEDURE — 27000221 HC OXYGEN, UP TO 24 HOURS

## 2023-02-01 PROCEDURE — 36415 COLL VENOUS BLD VENIPUNCTURE: CPT | Performed by: FAMILY MEDICINE

## 2023-02-01 PROCEDURE — 99900026 HC AIRWAY MAINTENANCE (STAT)

## 2023-02-01 PROCEDURE — 99900035 HC TECH TIME PER 15 MIN (STAT)

## 2023-02-01 PROCEDURE — 82803 BLOOD GASES ANY COMBINATION: CPT

## 2023-02-01 PROCEDURE — 94761 N-INVAS EAR/PLS OXIMETRY MLT: CPT

## 2023-02-01 PROCEDURE — 36600 WITHDRAWAL OF ARTERIAL BLOOD: CPT

## 2023-02-01 PROCEDURE — 20000000 HC ICU ROOM

## 2023-02-01 PROCEDURE — 63600175 PHARM REV CODE 636 W HCPCS

## 2023-02-01 PROCEDURE — 97110 THERAPEUTIC EXERCISES: CPT | Mod: CQ

## 2023-02-01 PROCEDURE — 94003 VENT MGMT INPAT SUBQ DAY: CPT

## 2023-02-01 PROCEDURE — 25000242 PHARM REV CODE 250 ALT 637 W/ HCPCS

## 2023-02-01 RX ORDER — LORAZEPAM 2 MG/ML
1 INJECTION INTRAMUSCULAR EVERY 6 HOURS PRN
Status: DISCONTINUED | OUTPATIENT
Start: 2023-02-01 | End: 2023-02-02 | Stop reason: HOSPADM

## 2023-02-01 RX ADMIN — IPRATROPIUM BROMIDE 0.5 MG: 0.5 SOLUTION RESPIRATORY (INHALATION) at 03:02

## 2023-02-01 RX ADMIN — LEVALBUTEROL HYDROCHLORIDE 1.25 MG: 1.25 SOLUTION, CONCENTRATE RESPIRATORY (INHALATION) at 12:02

## 2023-02-01 RX ADMIN — BUDESONIDE INHALATION 0.5 MG: 0.5 SUSPENSION RESPIRATORY (INHALATION) at 07:02

## 2023-02-01 RX ADMIN — LEVALBUTEROL HYDROCHLORIDE 1.25 MG: 1.25 SOLUTION, CONCENTRATE RESPIRATORY (INHALATION) at 07:02

## 2023-02-01 RX ADMIN — LEVALBUTEROL HYDROCHLORIDE 1.25 MG: 1.25 SOLUTION, CONCENTRATE RESPIRATORY (INHALATION) at 03:02

## 2023-02-01 RX ADMIN — FLUTICASONE PROPIONATE 50 MCG: 50 SPRAY, METERED NASAL at 09:02

## 2023-02-01 RX ADMIN — THEOPHYLLINE 400 MG: 400 TABLET, EXTENDED RELEASE ORAL at 08:02

## 2023-02-01 RX ADMIN — FERROUS SULFATE TAB 325 MG (65 MG ELEMENTAL FE) 1 EACH: 325 (65 FE) TAB at 09:02

## 2023-02-01 RX ADMIN — IPRATROPIUM BROMIDE 0.5 MG: 0.5 SOLUTION RESPIRATORY (INHALATION) at 07:02

## 2023-02-01 RX ADMIN — FUROSEMIDE 20 MG: 20 TABLET ORAL at 08:02

## 2023-02-01 RX ADMIN — FERROUS SULFATE TAB 325 MG (65 MG ELEMENTAL FE) 1 EACH: 325 (65 FE) TAB at 02:02

## 2023-02-01 RX ADMIN — OXYCODONE HYDROCHLORIDE AND ACETAMINOPHEN 500 MG: 500 TABLET ORAL at 08:02

## 2023-02-01 RX ADMIN — FAMOTIDINE 20 MG: 10 INJECTION INTRAVENOUS at 08:02

## 2023-02-01 RX ADMIN — PROPOFOL 25 MCG/KG/MIN: 10 INJECTION, EMULSION INTRAVENOUS at 04:02

## 2023-02-01 RX ADMIN — OXYBUTYNIN CHLORIDE 5 MG: 5 TABLET, EXTENDED RELEASE ORAL at 08:02

## 2023-02-01 RX ADMIN — OXYCODONE HYDROCHLORIDE AND ACETAMINOPHEN 500 MG: 500 TABLET ORAL at 09:02

## 2023-02-01 RX ADMIN — METFORMIN HYDROCHLORIDE 500 MG: 500 TABLET, FILM COATED ORAL at 05:02

## 2023-02-01 RX ADMIN — IPRATROPIUM BROMIDE 0.5 MG: 0.5 SOLUTION RESPIRATORY (INHALATION) at 12:02

## 2023-02-01 RX ADMIN — POLYETHYLENE GLYCOL 3350 17 G: 17 POWDER, FOR SOLUTION ORAL at 08:02

## 2023-02-01 RX ADMIN — ACETAMINOPHEN 650 MG: 325 TABLET, FILM COATED ORAL at 04:02

## 2023-02-01 RX ADMIN — FERROUS SULFATE TAB 325 MG (65 MG ELEMENTAL FE) 1 EACH: 325 (65 FE) TAB at 08:02

## 2023-02-01 RX ADMIN — FAMOTIDINE 20 MG: 10 INJECTION INTRAVENOUS at 09:02

## 2023-02-01 RX ADMIN — ATORVASTATIN CALCIUM 80 MG: 40 TABLET, FILM COATED ORAL at 08:02

## 2023-02-01 RX ADMIN — PREGABALIN 50 MG: 25 CAPSULE ORAL at 02:02

## 2023-02-01 RX ADMIN — PREGABALIN 50 MG: 25 CAPSULE ORAL at 08:02

## 2023-02-01 RX ADMIN — METFORMIN HYDROCHLORIDE 500 MG: 500 TABLET, FILM COATED ORAL at 08:02

## 2023-02-01 RX ADMIN — PREGABALIN 50 MG: 25 CAPSULE ORAL at 09:02

## 2023-02-01 NOTE — CARE UPDATE
02/01/23 0928   Patient Assessment/Suction   Level of Consciousness (AVPU) alert   Respiratory Effort Unlabored;Normal   Expansion/Accessory Muscles/Retractions no use of accessory muscles   Rhythm/Pattern, Respiratory assisted mechanically   PRE-TX-O2   Device (Oxygen Therapy) ventilator   Oxygen Concentration (%) 30   SpO2 (!) 92 %   Pulse Oximetry Type Continuous   Pulse 78   BP (!) 99/59   ETCO2   ETCO2 (mmHg) 47 mmHg   Airway Assistance   $ Tube Exchange Charges Tech time 15 min   Vent Select   Conventional Vent Y   Charged w/in last 24h YES   Preset Conventional Ventilator Settings   Vent Type    Ventilation Type VC   Vent Mode SIMV   Humidity HME   Set Rate 12 BPM   Vt Set 450 mL   PEEP/CPAP 5 cmH20   Pressure Support 10 cmH20   Peak Flow 60 L/min   Plateau Set/Insp. Hold (sec) 0   Insp Rise Time  70 %   I-Trigger Type  V-TRIG   Trigger Sensitivity Flow/I-Trigger 1 L/min   Patient Ventilator Parameters   Resp Rate Total 23 br/min   Peak Airway Pressure 33 cmH20   Mean Airway Pressure 10 cmH20   Plateau Pressure 0 cmH20   Exhaled Vt 455 mL   Total Ve 10.3 L/m   Spont Ve 4.37 L   I:E Ratio Measured 1:1.90   Auto PEEP 0 cmH20   Inspired Tidal Volume (VTI) 423 mL   Conventional Ventilator Alarms   Alarms On Y   Ve High Alarm 31.5 L/min   Ve Low Alarm 2.5 L/min   Vt High Alarm 1200 mL   Vt Low Alarm 215 mL   Resp Rate High Alarm 40 br/min   Press High Alarm 50 cmH2O   Apnea Rate 10   Apnea Volume (mL) 450 mL   Apnea Oxygen Concentration  100   Apnea Flow Rate (L/min) 60   T Apnea 20 sec(s)   Ready to Wean/Extubation Screen   FIO2<=50 (chart decimal) 0.3   MV<16L (chart vol.) 10.3   PEEP <=8 (chart #) 5   Negative Inspiratory Force   Negative Inspiratory Force (cm H2O) 0   Vital Capacity   Vital Capacity (mL) 0     PT FOUND TO HAVE RESP RATE DECREASED TO 12 PER DR WHALEY  AT 0840.

## 2023-02-01 NOTE — ASSESSMENT & PLAN NOTE
This patient does have evidence of infective focus  My overall impression is septic shock. Vital signs were reviewed and noted in progress note.  Antibiotics given-   Antibiotics (From admission, onward)    None        Cultures were taken-   Microbiology Results (last 7 days)     ** No results found for the last 168 hours. **        Latest lactate reviewed, they are-  No results for input(s): LACTATE in the last 72 hours.    Organ dysfunction indicated by Acute kidney injury, Encephalopathy  and Acute respiratory failure  Source-      Source control Achieved by- iv abx and This patient does have evidence of infective focus  My overall impression is septic shock.  Source:  Sputum culture from intubation is pending shows 3 organisms growing rare amounts will follow up  Antibiotics given-   Antibiotics (From admission, onward)    None        Latest lactate reviewed-  No results for input(s): LACTATE in the last 72 hours.  Organ dysfunction indicated by Acute kidney injury and Acute respiratory failure    Shock with decreased perfusion noted, Fluid challenge  was given at 30cc/kg and was not given at 30cc/kg due to      Post- resuscitation assessment- (Done after fluids given for shock)  Vital signs post fluid administrations were-  Temp Readings from Last 1 Encounters:   02/01/23 98.6 °F (37 °C) (Axillary)     BP Readings from Last 1 Encounters:   02/01/23 (!) 99/59     Pulse Readings from Last 1 Encounters:   02/01/23 78       Perfusion exam was performed within 6 hours of septic shock presentation after bolus shows Adequate tissue perfusion assessed by non-invasive monitoring  Weaned off Levophed  Source control achieved by:  Iv abx   echo done showed EF of 60-65% with normal right  Ventricular function   CT angio chest done on admission did not show any PE and no acute finding     CT of the abdomen done on admit showed some small renal calculi no other acute finding    Patient is off the Levophed

## 2023-02-01 NOTE — ASSESSMENT & PLAN NOTE
More p.o. and recheck levels in a.m.Patient with Paroxysmal (<7 days) atrial fibrillation which is controlled currently with Amiodarone. Patient is currently in sinus rhythm.[id number] Score: 3. HASBLED Score:  . Anticoagulation not indicated due to anemia.         Rate controlled overnight on amiodarone continue stable

## 2023-02-01 NOTE — CARE UPDATE
02/01/23 1247   Patient Assessment/Suction   Level of Consciousness (AVPU) alert   Respiratory Effort Mild;Short of breath   Expansion/Accessory Muscles/Retractions accessory muscle use   All Lung Fields Breath Sounds diminished   Rhythm/Pattern, Respiratory assisted mechanically   Cough Frequency with stimulation   Cough Type assisted   PRE-TX-O2   Device (Oxygen Therapy) ventilator   Oxygen Concentration (%) 30   SpO2 (!) 92 %   Pulse Oximetry Type Continuous   Pulse 107   Resp (!) 25   Aerosol Therapy   $ Aerosol Therapy Charges Aerosol Treatment   Daily Review of Necessity (SVN) completed   Respiratory Treatment Status (SVN) given   Treatment Route (SVN) in-line;ventilator   Patient Position (SVN) HOB elevated   Post Treatment Assessment (SVN) breath sounds improved   Signs of Intolerance (SVN) none   Breath Sounds Post-Respiratory Treatment   Throughout All Fields Post-Treatment All Fields   Throughout All Fields Post-Treatment aeration increased   Post-treatment Heart Rate (beats/min) 105   Post-treatment Resp Rate (breaths/min) 18   Airway Assistance   $ Tube Exchange Charges Tech time 15 min   Vent Select   Conventional Vent Y   Charged w/in last 24h YES   Preset Conventional Ventilator Settings   Vent Type    Ventilation Type VC   Vent Mode SIMV   Humidity HME   Set Rate 12 BPM   Vt Set 450 mL   PEEP/CPAP 5 cmH20   Pressure Support 10 cmH20   Peak Flow 60 L/min   Plateau Set/Insp. Hold (sec) 0   Insp Rise Time  70 %   I-Trigger Type  V-TRIG   Trigger Sensitivity Flow/I-Trigger 1 L/min   Patient Ventilator Parameters   Resp Rate Total 24 br/min   Peak Airway Pressure 15 cmH20   Mean Airway Pressure 9.9 cmH20   Plateau Pressure 0 cmH20   Exhaled Vt 450 mL   Total Ve 9.73 L/m   Spont Ve 4.65 L   I:E Ratio Measured 1:2.30   Auto PEEP 0 cmH20   Inspired Tidal Volume (VTI) 406 mL   Conventional Ventilator Alarms   Alarms On Y   Ve High Alarm 31.5 L/min   Resp Rate High Alarm 40 br/min   Apnea Rate 10    Apnea Volume (mL) 450 mL   Apnea Oxygen Concentration  100   Apnea Flow Rate (L/min) 60   T Apnea 20 sec(s)   Ready to Wean/Extubation Screen   FIO2<=50 (chart decimal) 0.3   MV<16L (chart vol.) 9.73   PEEP <=8 (chart #) 5   Ready to Wean Parameters   F/VT Ratio<105 (RSBI) (!) 55.56   Negative Inspiratory Force   Negative Inspiratory Force (cm H2O) 0   Vital Capacity   Vital Capacity (mL) 0   Pt was just turned and cleaned per nurses, therfore a little SOB. Pt responds that it is better after tx. ETCo2 adaptor changed and is now functioning properly.

## 2023-02-01 NOTE — SUBJECTIVE & OBJECTIVE
Interval History:      Review of Systems   Unable to perform ROS: Intubated     Objective:     Vital Signs (Most Recent):  Temp: 98.6 °F (37 °C) (02/01/23 0701)  Pulse: 78 (02/01/23 0928)  Resp: 19 (02/01/23 0715)  BP: (!) 99/59 (02/01/23 0928)  SpO2: (!) 92 % (02/01/23 0928)   Vital Signs (24h Range):  Temp:  [97.9 °F (36.6 °C)-99.2 °F (37.3 °C)] 98.6 °F (37 °C)  Pulse:  [] 78  Resp:  [19-31] 19  SpO2:  [89 %-99 %] 92 %  BP: ()/(41-76) 99/59     Weight: 59 kg (130 lb 1.6 oz)  Body mass index is 22.33 kg/m².    Intake/Output Summary (Last 24 hours) at 2/1/2023 0955  Last data filed at 2/1/2023 0500  Gross per 24 hour   Intake 2479.43 ml   Output 1625 ml   Net 854.43 ml      Physical Exam  Constitutional:       General: She is not in acute distress.     Appearance: She is not ill-appearing.      Comments: Intubated and sedated   HENT:      Head: Normocephalic and atraumatic.      Comments: Patient is intubated and sedated  Eyes:      General: No scleral icterus.     Extraocular Movements: Extraocular movements intact.   Neck:      Comments: ET tube and OG tube are in place  Cardiovascular:      Rate and Rhythm: Normal rate and regular rhythm.      Pulses: Normal pulses.      Heart sounds: Normal heart sounds.   Pulmonary:      Effort: Pulmonary effort is normal.      Breath sounds: Normal breath sounds.   Abdominal:      General: Bowel sounds are normal.      Palpations: Abdomen is soft.   Genitourinary:     Comments: [place] in place  Skin:     General: Skin is warm and dry.      Findings: No erythema, lesion or rash.   Neurological:      Comments: Patient is intubated and sedated       Significant Labs: All pertinent labs within the past 24 hours have been reviewed.  BMP:   Recent Labs   Lab 02/01/23  0414      K 4.1   CO2 35*   BUN 24.0*   CREATININE 0.52*   CALCIUM 8.1*     CBC:   Recent Labs   Lab 01/31/23  0411 02/01/23  0414   WBC 13.6* 10.4   HGB 8.1* 7.8*   HCT 27.1* 26.7*    194        Significant Imaging: I have reviewed all pertinent imaging results/findings within the past 24 hours.

## 2023-02-01 NOTE — PHYSICIAN QUERY
PT Name: Michel De La Paz  MR #: [id number]     DOCUMENTATION CLARIFICATION     CDS/: Mandy Palomares RN          Contact Information: [email or web address]    This form is a permanent document in the medical record.     Query Date: February 1, 2023    By submitting this query, we are merely seeking further clarification of documentation.  Please utilize your independent clinical judgment when addressing the question(s) below.  The Medical Record contains the following   Indicators   Supporting Clinical Findings Location in Medical Record     x SOB, GARCIA, Wheezing, Productive Cough, Use of Accessory Muscles, etc. Presented to ER with worsening SOB on exertion 1/14/2023 H&P     x RR         ABGs         O2 sat RR 20-22 O2 89-98% on vent     01/22/23 04:18   pH, Blood gas 7.360 (L)   pCO2, Blood gas 82.4 (HH)   pO2, Blood gas 73.1 (L)   Vt 450   RR 8   PEEP 5.0   PS 10.0   MODE SIMV    1/21/2023 vitals    Lab results    Hypoxia/Hypercapnia       x BiPAP/Intubation/Mechanical Ventilation Intubated on vent 1/21/2023 vitals, 1/22/2023 HM PN    Supplemental O2       x Home O2, Oxygen Dependence On home O2     she is not on home oxygen 1/13/2023 ED Provider Note    1/22/2023 HM PN      x Respiratory distress or failure chronic respiratory failure on home O2   Acute on chronic respiratory with hypoxia    Acute respiratory failure with hypoxia and hypercarbia  Hypercapnic and Hypoxic Respiratory failure which is Acute on chronic 1/14/2023 H&P      1/22/2023 HM PN     Radiology findings       x Acute/Chronic Illness chronic combined systolic and diastolic CHF, NYHA class 2; chronic respiratory fx on home O2     Severe sepsis with septic shock, hypokalemia, a fib with RVR, anemia, hematuria, transaminitis, recent weight loss 1/14/2023 H&P      1/22/2023 HM PN     Treatment      Other         The noted clinical guidelines are only system guidelines and do not replace the providers clinical judgment.    Provider, please  clarify the Respiratory Failure diagnosis or diagnoses associated with above clinical findings.     [  x  ] Acute and (on) Chronic Respiratory Failure with Hypoxia and Hypercapnia - Hypoxic: pO2 >10 mmHg below baseline or SpO2 < 91% on usual home O2 or O2 ? 2L/min over baseline home O2 and Hypercapnic: pCO2 >10 mmHg over baseline and pH < 7.35 and respiratory symptoms documented   [    ] Acute Respiratory Failure with Hypoxia and Hypercapnia - Hypoxia: ABG pO2 < 60 mmHg or O2 sat of <91% on room air and Hypercapnia: pCO2 > 50 mmHg with pH < 7.35 and respiratory symptoms documented   [    ] Acute and (on) Chronic Respiratory Failure with Hypoxia - pO2 >10 mmHg below baseline or SpO2 < 91% on usual home O2 or O2 ? 2L/min over baseline home O2 and respiratory symptoms documented   [    ] Other Respiratory Diagnosis (please specify): _________________   [   ] Clinically Undetermined     Form No. [id number]

## 2023-02-01 NOTE — PLAN OF CARE
[id number] LTAC requested patient be weaned from Levafed before resubmitting to [facility] for authorization. 2/01/23 Clinical updates faxed to [facility] as pt iff sandi.

## 2023-02-01 NOTE — PT/OT/SLP PROGRESS
Physical Therapy Treatment    Patient Name:  Michel De La Paz   MRN:  [id number]    Recommendations:     Discharge Recommendations: nursing facility, skilled, rehabilitation facility, LTACH (long-term acute care hospital)  Discharge Equipment Recommendations:    Barriers to discharge: None    Assessment:     Michel De La Paz is a 67 y.o. female admitted with a medical diagnosis of Acute respiratory failure with hypoxia and hypercarbia.  She presents with the following impairments/functional limitations: weakness, impaired endurance, impaired functional mobility, impaired balance .    Rehab Prognosis: Fair; patient would benefit from acute skilled PT services to address these deficits and reach maximum level of function.    Recent Surgery: * No surgery found *      Plan:     During this hospitalization, patient to be seen daily to address the identified rehab impairments via therapeutic exercises and progress toward the following goals:    Plan of Care Expires:       Subjective     Chief Complaint: overall weakness with bed mob.  Patient/Family Comments/goals: improve strength for bed mob and tf.  Pain/Comfort:  Pain Rating 1: 0/10      Objective:     Communicated with nsg prior to session.  Patient found left sidelying with bed alarm, blood pressure cuff, waterman catheter, oxygen, ventilator upon PT entry to room.     General Precautions: Standard,    Orthopedic Precautions:    Braces:    Respiratory Status: Nasal cannula, flow 2 L/min     Functional Mobility:        AM-PAC 6 CLICK MOBILITY  Turning over in bed (including adjusting bedclothes, sheets and blankets)?: 1  Sitting down on and standing up from a chair with arms (e.g., wheelchair, bedside commode, etc.): 1  Moving from lying on back to sitting on the side of the bed?: 1  Moving to and from a bed to a chair (including a wheelchair)?: 1  Need to walk in hospital room?: 1  Climbing 3-5 steps with a railing?: 1  Basic Mobility Total Score: 6       Treatment &  Education:  Pt performed bilateral UE/LE therex x 10 x 3 in supine.    Patient left left sidelying with all lines intact, call button in reach, and bed alarm on..    GOALS:   Multidisciplinary Problems       Physical Therapy Goals          Problem: Physical Therapy    Goal Priority Disciplines Outcome Goal Variances Interventions   Physical Therapy Goal     PT, PT/OT Ongoing, Progressing     Description: Goals to be met by: discharge     Patient will increase functional independence with mobility by performin. Supine to sit with MInimal Assistance                         Time Tracking:     PT Received On: 23  PT Start Time: 0645     PT Stop Time: 0700  PT Total Time (min): 15 min     Billable Minutes: Therapeutic Exercise 15    Treatment Type: Treatment  PT/PTA: PTA     PTA Visit Number: 3     2023

## 2023-02-01 NOTE — PHYSICIAN QUERY
PT Name: Michel De La Paz  MR #: [id number]     DOCUMENTATION CLARIFICATION     CDS/: Mandy Palomares RN          Contact Information: [email or web address]    This form is a permanent document in the medical record.     Query Date: February 1, 2023    By submitting this query, we are merely seeking further clarification of documentation.  Please utilize your independent clinical judgment when addressing the question(s) below.    The Medical Record contains the following   Indicators Supporting Clinical Findings Location in Medical Record     x Heart Failure documented chronic combined systolic and diastolic CHF, NYHA class 2 1/14/2023 H&P     x BNP Pro   1/13/2023 lab     x EF/Echo Echo Impression:  Normal LV size with normal function.   The ejection fraction is 60-65%.   There is no left ventricular hypertrophy.   Normal right ventricular size with normal function.   Poorly seen aortic valve. The aortic valve appears mildly calcified.   Normal mitral valve with less than mild mitral regurgitation.   There is mild tricuspid regurgitation.  1/16/2023 Cardiology     x Radiology findings CXR  IMPRESSION:   There is prominence of proximal pulmonary vasculature with what appear to be at least mild changes of interstitial edema.    CXR Impression:   pulmonary vasculature is congested with slightly increased interstitial lung markings  1/17/2023 Radiology        1/20/23 Radiology     x Subjective/Objective Respiratory Conditions Presented to ER with worsening SOB on exertion    On 1/19, pt got SOB overnight placed on 7L oximizer due to drop in oxygen sats  On 1/20, pt more obtunded, stat ABG showed acute respiratory acidosis, transferred to ICU and blaced on BIPAP  1/21 stable on vent overnight 1/13/2023 ED Provider Note    1/22/2023 HM PN     Recent/Current MI      Heart Transplant, LVAD      Edema, JVD      Ascites       x Diuretics/Meds Furosemide 30mg Tablet daily  furosemide tablet 20 mg daily  1/22/2023-1/24/2023 medications  1/24/2023-current medications    Other Treatment      Other       Heart failure is a clinical diagnosis which includes symptomatic fluid retention, elevated intracardiac pressures, and/or the inability of the heart to deliver adequate blood flow.    Heart Failure with reduced Ejection Fraction (HFrEF) or Systolic Heart Failure (loses ability to contract normally, EF is <40%)    Heart Failure with preserved Ejection Fraction (HFpEF) or Diastolic Heart Failure (stiff ventricles, does not relax properly, EF is >50%)     Heart Failure with Combined Systolic and Diastolic Failure (stiff ventricles, does not relax properly and EF is <50%)    Mid-range or mildly reduced ejection fraction (HFmrEF) is classified as systolic heart failure.  Congestive heart failure with a recovered EF is classified as Diastolic Heart Failure.  Common clues to acute exacerbation:  Rapidly progressive symptoms (w/in 2 weeks of presentation), using IV diuretics, using supplemental O2, pulmonary edema on Xray, new or worsening pleural effusion, +JVD or other signs of volume overload, MI w/in 4 weeks, and/or BNP >500  The clinical guidelines noted are only system guidelines, and do not replace the providers clinical judgment.    Provider, please specify the diagnosis associated with the above clinical findings.    [ x  ]  Acute on Chronic Diastolic Heart Failure (HFpEF) - worsening of CHF signs/symptoms in preexisting CHF   [   ]  Chronic Combined Systolic and Diastolic Heart Failure - pre-existing and stable   [   ]  Other (please specify): ___________________________________   [  ]  Clinically Undetermined       Please document in your progress notes daily for the duration of treatment until resolved and include in your discharge summary.    References:  American Heart Association editorial staff. (2017, May). Ejection Fraction Heart Failure Measurement. American Heart Association.  https://www.heart.org/en/health-topics/heart-failure/diagnosing-heart-failure/ejection-fraction-heart-failure-measurement#:~:text=Ejection%20fraction%20(EF)%20is%20a,pushed%20out%20with%20each%20heartbeat  SAMIR Soto (2020, December 15). Heart failure with preserved ejection fraction: Clinical manifestations and diagnosis. [place]. https://www.[employer]/contents/heart-failure-with-preserved-ejection-fraction-clinical-manifestations-and-diagnosis.  ICD-10-CM/PCS Coding Clinic Third Quarter ICD-10, Effective with discharges: September 8, 2020 [facility] § Heart failure with mid-range or mildly reduced ejection fraction (2020).  ICD-10-CM/PCS Coding Clinic Third Quarter ICD-10, Effective with discharges: September 8, 2020 [facility] § Heart failure with recovered ejection fraction (2020).  Form No. 05636

## 2023-02-01 NOTE — PROGRESS NOTES
[facility] Medicine  Progress Note    Patient Name: Michel De La Paz  MRN: [id number]  Patient Class: IP- Inpatient   Admission Date: 1/13/2023  Length of Stay: 19 days  Attending Physician: Ac Childers MD  Primary Care Provider: WESLY Best        Subjective:     Principal Problem:Acute respiratory failure with hypoxia and hypercarbia        HPI:   68 yo F admitted overnight with worsening weakenss, sob and tachycardia. H/  H low initially and got 2 U pRBC overnight. H/H stable this and trend up.  EKG atrial fibrillation with rvr better.            Overview/Hospital Course:   68 yo F admitted overnight with worsening weakenss, sob and tachycardia. H/  H low initially and got 2 U pRBC overnight. H/H stable this and trend up.  EKG atrial fibrillation with rvr better.    01/15   HR better controlled overnight on amiodarone increased TID    01/16 c/o constipation overngiht and this AM  iron low on panel  HR stable overnight  01/17/2023 pt c/o no bm x 2 weeks.  H&H has been stable  01/18/2023 denies pain, no BM yet, H&H stable, very weak and unsteady gait, working with PT.  01/19/2023 pt had large BM today, still very weak, got SOB overnight placed on 7L oximizer due to drop in oxygen sats.  Echo EF 55-60% and CXR shows pulmonary edema.  Pt c/o feeling weak.  Surgery plans for EGD in am.  pt has had recent weight loss about 4 kng since April, was 58 kg now 54, BMI down to 20.  01/20/2023 pt more obtunded overnight this am with increased weakness and not following commands, stat ABG done showed PCO2 elevated and acute respiratory acidosis, transferred pt to ICU and placed on BIPAP, 1 hr ABG very minimal improvement and pt GSC of 6 low with minimally responsive withdraws to pain, no verbal responses  01/21/2023 pt stable on vent overnight, abg this am looks good, will start to wean, CXR no infiltrates, blood and urine cx repeated and pending.     01/22/2023 patient remained  stable on the vent were able to wean her rate a bit however her pCO2 did go up to the 80s ABG is still reasonable at 7.3  but with pCO2 very high in the 80  we put him back on AC with a rate of 16  01/23/2023 patient stable overnight her ABG actually looks better this a.m. her pCO2 has normalized after we switched her back to AC with a rate is 16 she is tolerating currently her FiO2 is at 50% respiratory rate is 20 she is still on the Levophed at 13.5 mics and getting Clinimix at 40 and propofol at 20  01/24/2023 tolerating ventilator wean overnight FiO2 50% down  01/25/2023 FiO2 down to 40% this AM -assist control  Awake and alert on vent minimal sedation  01/26/2023 stable on simv overnight but PCO2 trending up to 56.2 off AC mode  Awakening with sedation down  Cxr stable   Blood counts improved  01/27/2023 extubated this AM tolerated for short time  Developed acute respiratory distress O2 sats 60's minimally responive   Reintubated by anthesia  01/28/2023 abg stable on simv this Am   AWAKE AND ALERT  No more cough  Pain better   Tube feeds for the rec's talked to ivelisse will start slow  Not tolerated 1st time again  01/29/2023  No major change overnight remain on simv 30%   Abg ok this AM   01/30/2023  Unable to wean vent overnight  Tolerating tube feeds  01/31/2023 pt was extubated on 01/27, did not tolerate, unable to wean in last several days, working on LTAC placement.  Still on levophed 2.2 mcg, no other changes.  Feeding currently at 40 goal is 55.   Still on TPN, dietary recs are followed.  Still on diprivan at 35  02/01/2023 patient is weaned off of the Diprivan and she is much more awake 21 2023. Patient is off the sedation her to prevent has been weighing. Shes much more waking arousable.      Interval History:      Review of Systems   Unable to perform ROS: Intubated     Objective:     Vital Signs (Most Recent):  Temp: 98.6 °F (37 °C) (02/01/23 0701)  Pulse: 78 (02/01/23 0928)  Resp: 19 (02/01/23  0715)  BP: (!) 99/59 (02/01/23 0928)  SpO2: (!) 92 % (02/01/23 0928)   Vital Signs (24h Range):  Temp:  [97.9 °F (36.6 °C)-99.2 °F (37.3 °C)] 98.6 °F (37 °C)  Pulse:  [] 78  Resp:  [19-31] 19  SpO2:  [89 %-99 %] 92 %  BP: ()/(41-76) 99/59     Weight: 59 kg (130 lb 1.6 oz)  Body mass index is 22.33 kg/m².    Intake/Output Summary (Last 24 hours) at 2/1/2023 0955  Last data filed at 2/1/2023 0500  Gross per 24 hour   Intake 2479.43 ml   Output 1625 ml   Net 854.43 ml      Physical Exam  Constitutional:       General: She is not in acute distress.     Appearance: She is not ill-appearing.      Comments: Intubated and sedated   HENT:      Head: Normocephalic and atraumatic.      Comments: Patient is intubated and sedated  Eyes:      General: No scleral icterus.     Extraocular Movements: Extraocular movements intact.   Neck:      Comments: ET tube and OG tube are in place  Cardiovascular:      Rate and Rhythm: Normal rate and regular rhythm.      Pulses: Normal pulses.      Heart sounds: Normal heart sounds.   Pulmonary:      Effort: Pulmonary effort is normal.      Breath sounds: Normal breath sounds.   Abdominal:      General: Bowel sounds are normal.      Palpations: Abdomen is soft.   Genitourinary:     Comments: Sheridan in place  Skin:     General: Skin is warm and dry.      Findings: No erythema, lesion or rash.   Neurological:      Comments: Patient is intubated and sedated       Significant Labs: All pertinent labs within the past 24 hours have been reviewed.  BMP:   Recent Labs   Lab 02/01/23 0414      K 4.1   CO2 35*   BUN 24.0*   CREATININE 0.52*   CALCIUM 8.1*     CBC:   Recent Labs   Lab 01/31/23 0411 02/01/23 0414   WBC 13.6* 10.4   HGB 8.1* 7.8*   HCT 27.1* 26.7*    194       Significant Imaging: I have reviewed all pertinent imaging results/findings within the past 24 hours.         Assessment/Plan:      * Acute respiratory failure with hypoxia and hypercarbia  Patient with  Hypercapnic and Hypoxic Respiratory failure which is Acute on chronic.  she is not on home oxygen. Supplemental oxygen was provided and noted- Vent Mode: SIMV  Oxygen Concentration (%):  [30] 30  Resp Rate Total:  [16 br/min-37 br/min] 23 br/min  Vt Set:  [450 mL] 450 mL  PEEP/CPAP:  [5 cmH20] 5 cmH20  Pressure Support:  [10 cmH20] 10 cmH20  Mean Airway Pressure:  [10 ovW60-95 cmH20] 10 cmH20.   Signs/symptoms of respiratory failure include- tachypnea, increased work of breathing and respiratory distress. Contributing diagnoses includes - CHF and COPD Labs and images were reviewed. Patient Has recent ABG, which has been reviewed. Will treat underlying causes and adjust management of respiratory failure as follows-     She will remain on the AC, get our case management consult to attempt for LTAC placement continue all other meds and treatment including her leave albuterol nebs IV steroids IV antibiotics follow up on cultures as they report         Failed extubation  01/27   Decrease IMV rate to 12 case management is working on LTAC placement    Severe sepsis with septic shock  This patient does have evidence of infective focus  My overall impression is septic shock. Vital signs were reviewed and noted in progress note.  Antibiotics given-   Antibiotics (From admission, onward)    None        Cultures were taken-   Microbiology Results (last 7 days)     ** No results found for the last 168 hours. **        Latest lactate reviewed, they are-  No results for input(s): LACTATE in the last 72 hours.    Organ dysfunction indicated by Acute kidney injury, Encephalopathy  and Acute respiratory failure  Source-      Source control Achieved by- iv abx and This patient does have evidence of infective focus  My overall impression is septic shock.  Source:  Sputum culture from intubation is pending shows 3 organisms growing rare amounts will follow up  Antibiotics given-   Antibiotics (From admission, onward)    None         Latest lactate reviewed-  No results for input(s): LACTATE in the last 72 hours.  Organ dysfunction indicated by Acute kidney injury and Acute respiratory failure    Shock with decreased perfusion noted, Fluid challenge  was given at 30cc/kg and was not given at 30cc/kg due to      Post- resuscitation assessment- (Done after fluids given for shock)  Vital signs post fluid administrations were-  Temp Readings from Last 1 Encounters:   02/01/23 98.6 °F (37 °C) (Axillary)     BP Readings from Last 1 Encounters:   02/01/23 (!) 99/59     Pulse Readings from Last 1 Encounters:   02/01/23 78       Perfusion exam was performed within 6 hours of septic shock presentation after bolus shows Adequate tissue perfusion assessed by non-invasive monitoring  Weaned off Levophed  Source control achieved by:  Iv abx   echo done showed EF of 60-65% with normal right  Ventricular function   CT angio chest done on admission did not show any PE and no acute finding     CT of the abdomen done on admit showed some small renal calculi no other acute finding    Patient is off the Levophed        Hypokalemia    Stable    Atrial fibrillation with RVR  More p.o. and recheck levels in a.m.Patient with Paroxysmal (<7 days) atrial fibrillation which is controlled currently with Amiodarone. Patient is currently in sinus rhythm.[id number] Score: 3. HASBLED Score:  . Anticoagulation not indicated due to anemia.         Rate controlled overnight on amiodarone continue stable    Microcytic anemia   Patient was scheduled for EGD and had a transfusion at the time of admission here however EGD was postponed due to acute respiratory failure with hypercarbia requiring intubation she will follow up with Dr. Abel once he is more stable  H&H remains stable will continue to monitor  8/28 has been stable      Hematuria   Recently been worked up by Urology has some small stones family reports other workup was negative including what sounds like a  endoscopy or bladder scope      Transaminitis   Improved        Recent weight loss  Nutrition consulted. Most recent weight and BMI monitored-  Patient will have EGD and colonoscopy once stabilized from a respiratory standpoint                        Measurements:  Wt Readings from Last 1 Encounters:   01/31/23 59 kg (130 lb 1.6 oz)   Body mass index is 22.33 kg/m².    Recommendations: Recommendation/Intervention: RECOMMEND RESTART TUBE FEEDS. START DIABETISOURCE 1.2 @ 20 ML/HR. ONCE TOLERATING TITRATE SLOWLY TO GOAL RATE OF 55 ML/HR (1518 KCAL, 76 GM PRO, 1032 ML H2O). ONCE PATIENT IS TOLERATED TUBE FEEDS @ 35 ML/HR BEGIN TO WEAN PPN. RECOMMENDATIONS WITH PROPOFOL AT 6.3 ML/HR WILL PROVIDE 99% KCAL NEEDS % PROTEIN NEEDS.  Goals: TOLERATE TUBE FEEDS AT GOAL RATE OF 55 ML/HR.    Patient has been screened and assessed by RD. RD will follow patient.        VTE Risk Mitigation (From admission, onward)         Ordered     Place sequential compression device  Until discontinued         01/21/23 1326                Discharge Planning   LASHAWN:      Code Status: Full Code   Is the patient medically ready for discharge?:     Reason for patient still in hospital (select all that apply): Patient trending condition and Treatment                     Megan Iglesias MD  Department of Hospital Medicine   [facility]

## 2023-02-01 NOTE — ASSESSMENT & PLAN NOTE
Patient with Hypercapnic and Hypoxic Respiratory failure which is Acute on chronic.  she is not on home oxygen. Supplemental oxygen was provided and noted- Vent Mode: SIMV  Oxygen Concentration (%):  [30] 30  Resp Rate Total:  [16 br/min-37 br/min] 23 br/min  Vt Set:  [450 mL] 450 mL  PEEP/CPAP:  [5 cmH20] 5 cmH20  Pressure Support:  [10 cmH20] 10 cmH20  Mean Airway Pressure:  [10 oxF90-10 cmH20] 10 cmH20.   Signs/symptoms of respiratory failure include- tachypnea, increased work of breathing and respiratory distress. Contributing diagnoses includes - CHF and COPD Labs and images were reviewed. Patient Has recent ABG, which has been reviewed. Will treat underlying causes and adjust management of respiratory failure as follows-     She will remain on the AC, get our case management consult to attempt for LTAC placement continue all other meds and treatment including her leave albuterol nebs IV steroids IV antibiotics follow up on cultures as they report         Failed extubation  01/27   Decrease IMV rate to 12 case management is working on LTAC placement

## 2023-02-02 VITALS
OXYGEN SATURATION: 97 % | TEMPERATURE: 98 F | SYSTOLIC BLOOD PRESSURE: 107 MMHG | HEART RATE: 78 BPM | WEIGHT: 132.31 LBS | HEIGHT: 64 IN | BODY MASS INDEX: 22.59 KG/M2 | RESPIRATION RATE: 20 BRPM | DIASTOLIC BLOOD PRESSURE: 66 MMHG

## 2023-02-02 LAB
ABS NEUT CALC (OHS): 8.18 X10(3)/MCL (ref 2.1–9.2)
ANION GAP SERPL CALC-SCNC: 1 MEQ/L (ref 2–13)
ANISOCYTOSIS BLD QL SMEAR: ABNORMAL
BASE EXCESS BLD CALC-SCNC: 9.2 MMOL/L
BLOOD GAS SAMPLE TYPE (OHS): ABNORMAL
BUN SERPL-MCNC: 19 MG/DL (ref 7–20)
CALCIUM SERPL-MCNC: 8.4 MG/DL (ref 8.4–10.2)
CHLORIDE SERPL-SCNC: 99 MMOL/L (ref 98–110)
CO2 SERPL-SCNC: 37 MMOL/L (ref 21–32)
CREAT SERPL-MCNC: 0.59 MG/DL (ref 0.66–1.25)
CREAT/UREA NIT SERPL: 32 (ref 12–20)
EOSINOPHIL NFR BLD MANUAL: 0.09 X10(3)/MCL (ref 0–0.9)
EOSINOPHIL NFR BLD MANUAL: 1 % (ref 0–8)
ERYTHROCYTE [DISTWIDTH] IN BLOOD BY AUTOMATED COUNT: 25.5 % (ref 11–14.5)
FIO2 BLOOD GAS (OHS): 30 %
GFR SERPLBLD CREATININE-BSD FMLA CKD-EPI: >90 MLS/MIN/1.73/M2
GLUCOSE SERPL-MCNC: 110 MG/DL (ref 70–115)
HCO3 BLDA-SCNC: 35.3 MMOL/L
HCT VFR BLD AUTO: 24.6 % (ref 36–48)
HGB BLD-MCNC: 7.1 GM/DL (ref 11.8–16)
HYPOCHROMIA BLD QL SMEAR: ABNORMAL
IMM GRANULOCYTES # BLD AUTO: 0.04 X10(3)/MCL (ref 0–0.03)
IMM GRANULOCYTES NFR BLD AUTO: 0.5 % (ref 0–0.5)
LYMPHOCYTES NFR BLD MANUAL: 0.18 X10(3)/MCL
LYMPHOCYTES NFR BLD MANUAL: 2 % (ref 13–40)
MAGNESIUM SERPL-MCNC: 2.2 MG/DL (ref 1.8–2.4)
MCH RBC QN AUTO: 22.2 PG (ref 27–34)
MCV RBC AUTO: 76.9 FL (ref 79–99)
MEAN CELL HEMOGLOBIN CONCENTRATION (OHS) G/DL: 28.9 G/DL (ref 31–37)
MECH RR (OHS): 12 B/MIN
MECH VT (OHS): 450 ML
MICROCYTES BLD QL SMEAR: ABNORMAL
MODE (OHS): ABNORMAL
MONOCYTES NFR BLD MANUAL: 0.35 X10(3)/MCL (ref 0.1–1.3)
MONOCYTES NFR BLD MANUAL: 4 % (ref 2–11)
NEUTROPHILS NFR BLD MANUAL: 92 % (ref 47–80)
NEUTS BAND NFR BLD MANUAL: 1 % (ref 0–11)
NOTIFIED (OHS): ABNORMAL
NOTIFIED BY (OHS): ABNORMAL
NRBC BLD AUTO-RTO: 0 % (ref 0–1)
PCO2 BLDA: 57.7 MMHG (ref 35–45)
PEEP (OHS): 5 CMH2O
PH BLDA: 7.41 [PH] (ref 7.38–7.49)
PHOSPHATE SERPL-MCNC: 2.8 MG/DL (ref 2.5–4.9)
PLATELET # BLD AUTO: 285 X10(3)/MCL (ref 140–371)
PLATELET # BLD EST: NORMAL 10*3/UL
PMV BLD AUTO: 11.1 FL (ref 9.4–12.4)
PO2 BLDA: 83.1 MMHG (ref 80–100)
POCT GLUCOSE: 105 MG/DL (ref 70–110)
POIKILOCYTOSIS BLD QL SMEAR: ABNORMAL
POTASSIUM SERPL-SCNC: 4.2 MMOL/L (ref 3.5–5.1)
PS (OHS): 10 CMH2O
RBC # BLD AUTO: 3.2 X10(6)/MCL (ref 4–5.1)
RBC MORPH BLD: ABNORMAL
SAO2 % BLDA: 95.8 %
SODIUM SERPL-SCNC: 137 MMOL/L (ref 135–145)
TARGETS BLD QL SMEAR: ABNORMAL
WBC # SPEC AUTO: 8.8 X10(3)/MCL (ref 4–11.5)

## 2023-02-02 PROCEDURE — 99900026 HC AIRWAY MAINTENANCE (STAT)

## 2023-02-02 PROCEDURE — 83735 ASSAY OF MAGNESIUM: CPT | Performed by: FAMILY MEDICINE

## 2023-02-02 PROCEDURE — 80048 BASIC METABOLIC PNL TOTAL CA: CPT | Performed by: FAMILY MEDICINE

## 2023-02-02 PROCEDURE — 27000221 HC OXYGEN, UP TO 24 HOURS

## 2023-02-02 PROCEDURE — 25000003 PHARM REV CODE 250

## 2023-02-02 PROCEDURE — 94003 VENT MGMT INPAT SUBQ DAY: CPT

## 2023-02-02 PROCEDURE — 84100 ASSAY OF PHOSPHORUS: CPT | Performed by: FAMILY MEDICINE

## 2023-02-02 PROCEDURE — 25000003 PHARM REV CODE 250: Performed by: FAMILY MEDICINE

## 2023-02-02 PROCEDURE — 85025 COMPLETE CBC W/AUTO DIFF WBC: CPT | Performed by: FAMILY MEDICINE

## 2023-02-02 PROCEDURE — 94640 AIRWAY INHALATION TREATMENT: CPT

## 2023-02-02 PROCEDURE — 85027 COMPLETE CBC AUTOMATED: CPT | Performed by: FAMILY MEDICINE

## 2023-02-02 PROCEDURE — 94761 N-INVAS EAR/PLS OXIMETRY MLT: CPT

## 2023-02-02 PROCEDURE — 36415 COLL VENOUS BLD VENIPUNCTURE: CPT | Performed by: FAMILY MEDICINE

## 2023-02-02 PROCEDURE — 25000242 PHARM REV CODE 250 ALT 637 W/ HCPCS

## 2023-02-02 PROCEDURE — 82803 BLOOD GASES ANY COMBINATION: CPT

## 2023-02-02 PROCEDURE — 36600 WITHDRAWAL OF ARTERIAL BLOOD: CPT

## 2023-02-02 PROCEDURE — 99900035 HC TECH TIME PER 15 MIN (STAT)

## 2023-02-02 PROCEDURE — 97530 THERAPEUTIC ACTIVITIES: CPT | Mod: CQ

## 2023-02-02 RX ORDER — ACETAMINOPHEN 325 MG/1
650 TABLET ORAL EVERY 4 HOURS PRN
Refills: 0
Start: 2023-02-02 | End: 2023-06-21

## 2023-02-02 RX ORDER — ATORVASTATIN CALCIUM 80 MG/1
80 TABLET, FILM COATED ORAL DAILY
Qty: 90 TABLET | Refills: 3 | OUTPATIENT
Start: 2023-02-02 | End: 2023-06-21 | Stop reason: SDUPTHER

## 2023-02-02 RX ORDER — FUROSEMIDE 20 MG/1
20 TABLET ORAL DAILY
Qty: 30 TABLET | Refills: 11 | Status: ON HOLD | OUTPATIENT
Start: 2023-02-02 | End: 2023-03-08 | Stop reason: HOSPADM

## 2023-02-02 RX ORDER — ASCORBIC ACID 500 MG
500 TABLET ORAL 2 TIMES DAILY
Start: 2023-02-02

## 2023-02-02 RX ORDER — METFORMIN HYDROCHLORIDE 500 MG/1
500 TABLET ORAL 2 TIMES DAILY WITH MEALS
Qty: 180 TABLET | Refills: 3 | OUTPATIENT
Start: 2023-02-02 | End: 2023-03-27

## 2023-02-02 RX ORDER — ACETAMINOPHEN 650 MG/1
650 SUPPOSITORY RECTAL EVERY 4 HOURS PRN
Refills: 0
Start: 2023-02-02 | End: 2023-06-21

## 2023-02-02 RX ORDER — AMIODARONE HYDROCHLORIDE 400 MG/1
400 TABLET ORAL 3 TIMES DAILY
Qty: 90 TABLET | Refills: 11 | OUTPATIENT
Start: 2023-02-02 | End: 2023-06-21

## 2023-02-02 RX ORDER — THEOPHYLLINE 400 MG/1
400 TABLET, EXTENDED RELEASE ORAL DAILY
Qty: 30 TABLET | Refills: 11 | OUTPATIENT
Start: 2023-02-02 | End: 2023-08-23 | Stop reason: SDUPTHER

## 2023-02-02 RX ORDER — BUDESONIDE 0.5 MG/2ML
0.5 INHALANT ORAL EVERY 12 HOURS
Refills: 0
Start: 2023-02-02 | End: 2024-02-28 | Stop reason: ALTCHOICE

## 2023-02-02 RX ADMIN — LEVALBUTEROL HYDROCHLORIDE 1.25 MG: 1.25 SOLUTION, CONCENTRATE RESPIRATORY (INHALATION) at 12:02

## 2023-02-02 RX ADMIN — LEVALBUTEROL HYDROCHLORIDE 1.25 MG: 1.25 SOLUTION, CONCENTRATE RESPIRATORY (INHALATION) at 03:02

## 2023-02-02 RX ADMIN — FERROUS SULFATE TAB 325 MG (65 MG ELEMENTAL FE) 1 EACH: 325 (65 FE) TAB at 08:02

## 2023-02-02 RX ADMIN — METFORMIN HYDROCHLORIDE 500 MG: 500 TABLET, FILM COATED ORAL at 06:02

## 2023-02-02 RX ADMIN — FAMOTIDINE 20 MG: 10 INJECTION INTRAVENOUS at 08:02

## 2023-02-02 RX ADMIN — IPRATROPIUM BROMIDE 0.5 MG: 0.5 SOLUTION RESPIRATORY (INHALATION) at 12:02

## 2023-02-02 RX ADMIN — OXYBUTYNIN CHLORIDE 5 MG: 5 TABLET, EXTENDED RELEASE ORAL at 08:02

## 2023-02-02 RX ADMIN — IPRATROPIUM BROMIDE 0.5 MG: 0.5 SOLUTION RESPIRATORY (INHALATION) at 03:02

## 2023-02-02 RX ADMIN — THEOPHYLLINE 400 MG: 400 TABLET, EXTENDED RELEASE ORAL at 08:02

## 2023-02-02 RX ADMIN — PREGABALIN 50 MG: 25 CAPSULE ORAL at 08:02

## 2023-02-02 RX ADMIN — OXYCODONE HYDROCHLORIDE AND ACETAMINOPHEN 500 MG: 500 TABLET ORAL at 08:02

## 2023-02-02 RX ADMIN — FUROSEMIDE 20 MG: 20 TABLET ORAL at 08:02

## 2023-02-02 RX ADMIN — ATORVASTATIN CALCIUM 80 MG: 40 TABLET, FILM COATED ORAL at 08:02

## 2023-02-02 RX ADMIN — BUDESONIDE INHALATION 0.5 MG: 0.5 SUSPENSION RESPIRATORY (INHALATION) at 07:02

## 2023-02-02 RX ADMIN — LEVALBUTEROL HYDROCHLORIDE 1.25 MG: 1.25 SOLUTION, CONCENTRATE RESPIRATORY (INHALATION) at 07:02

## 2023-02-02 RX ADMIN — IPRATROPIUM BROMIDE 0.5 MG: 0.5 SOLUTION RESPIRATORY (INHALATION) at 07:02

## 2023-02-02 NOTE — PT/OT/SLP PROGRESS
Physical Therapy Treatment    Patient Name:  Michel De La Paz   MRN:  [id number]    Recommendations:     Discharge Recommendations: nursing facility, skilled, rehabilitation facility, LTACH (long-term acute care hospital)  Discharge Equipment Recommendations:    Barriers to discharge: None    Assessment:     Michel De La Paz is a 67 y.o. female admitted with a medical diagnosis of Acute respiratory failure with hypoxia and hypercarbia.  She presents with the following impairments/functional limitations: weakness, impaired endurance, impaired functional mobility, impaired balance .    Rehab Prognosis: Poor; patient would benefit from acute skilled PT services to address these deficits and reach maximum level of function.    Recent Surgery: * No surgery found *      Plan:     During this hospitalization, patient to be seen daily to address the identified rehab impairments via therapeutic exercises and progress toward the following goals:    Plan of Care Expires:       Subjective     Chief Complaint: weakness with bed mob and tf.  Patient/Family Comments/goals: increase strength for bed mob and sit to stand.  Pain/Comfort:  Pain Rating 1: 0/10      Objective:     Communicated with nsg prior to session.  Patient found supine with bed alarm, blood pressure cuff, waterman catheter, oxygen, ventilator upon PT entry to room.     General Precautions: Standard,    Orthopedic Precautions:    Braces:    Respiratory Status: Nasal cannula, flow 2 L/min     Functional Mobility:        AM-PAC 6 CLICK MOBILITY  Turning over in bed (including adjusting bedclothes, sheets and blankets)?: 2  Sitting down on and standing up from a chair with arms (e.g., wheelchair, bedside commode, etc.): 1  Moving from lying on back to sitting on the side of the bed?: 1  Moving to and from a bed to a chair (including a wheelchair)?: 1  Need to walk in hospital room?: 1  Climbing 3-5 steps with a railing?: 1  Basic Mobility Total Score: 7       Treatment &  Education:  PROM to bilateral UE/LE's to improve mobility and decrease risk of contractures.    Patient left supine with all lines intact, call button in reach, and bed alarm on..    GOALS:   Multidisciplinary Problems       Physical Therapy Goals          Problem: Physical Therapy    Goal Priority Disciplines Outcome Goal Variances Interventions   Physical Therapy Goal     PT, PT/OT Ongoing, Progressing     Description: Goals to be met by: discharge     Patient will increase functional independence with mobility by performin. Supine to sit with MInimal Assistance                         Time Tracking:     PT Received On: 23  PT Start Time: 630     PT Stop Time: 645  PT Total Time (min): 15 min     Billable Minutes: Therapeutic Activity 15    Treatment Type: Treatment  PT/PTA: PTA     PTA Visit Number: 4     2023

## 2023-02-02 NOTE — CARE UPDATE
02/02/23 1155   Ready to Wean Parameters   $ Extubation Tech Time Tech Time 15 min   Code Blue/Rapid Response   $ Pt. Transferred to Other Hospital  (Assisted [facility] with transfer of pt.)

## 2023-02-02 NOTE — PLAN OF CARE
[facility] contacted to cancel referral as patient is being transferred to [facility] for pulmonology.

## 2023-02-02 NOTE — DISCHARGE SUMMARY
[facility] Medicine  Discharge Summary      Patient Name: Michel De La Paz  MRN: [id number]  NASIR: [id number]  Patient Class: IP- Inpatient  Admission Date: 1/13/2023  Hospital Length of Stay: 20 days  Discharge Date and Time:  02/02/2023 8:32 AM  Attending Physician: Ac Childers MD   Discharging Provider: Megan Iglesias MD  Primary Care Provider: GENARO Best-ANTONIO    Primary Care Team: Networked reference to record PCT     HPI:    68 yo F admitted overnight with worsening weakenss, sob and tachycardia. H/  H low initially and got 2 U pRBC overnight. H/H stable this and trend up.  EKG atrial fibrillation with rvr better.            * No surgery found *      Hospital Course:    68 yo F admitted overnight with worsening weakenss, sob and tachycardia. H/  H low initially and got 2 U pRBC overnight. H/H stable this and trend up.  EKG atrial fibrillation with rvr better.    01/15   HR better controlled overnight on amiodarone increased TID    01/16 c/o constipation overngiht and this AM  iron low on panel  HR stable overnight  01/17/2023 pt c/o no bm x 2 weeks.  H&H has been stable  01/18/2023 denies pain, no BM yet, H&H stable, very weak and unsteady gait, working with PT.  01/19/2023 pt had large BM today, still very weak, got SOB overnight placed on 7L oximizer due to drop in oxygen sats.  Echo EF 55-60% and CXR shows pulmonary edema.  Pt c/o feeling weak.  Surgery plans for EGD in am.  pt has had recent weight loss about 4 kng since April, was 58 kg now 54, BMI down to 20.  01/20/2023 pt more obtunded overnight this am with increased weakness and not following commands, stat ABG done showed PCO2 elevated and acute respiratory acidosis, transferred pt to ICU and placed on BIPAP, 1 hr ABG very minimal improvement and pt GSC of 6 low with minimally responsive withdraws to pain, no verbal responses  01/21/2023 pt stable on vent overnight, abg this am looks good, will start to wean,  CXR no infiltrates, blood and urine cx repeated and pending.     01/22/2023 patient remained stable on the vent were able to wean her rate a bit however her pCO2 did go up to the 80s ABG is still reasonable at 7.3  but with pCO2 very high in the 80  we put him back on AC with a rate of 16  01/23/2023 patient stable overnight her ABG actually looks better this a.m. her pCO2 has normalized after we switched her back to AC with a rate is 16 she is tolerating currently her FiO2 is at 50% respiratory rate is 20 she is still on the Levophed at 13.5 mics and getting Clinimix at 40 and propofol at 20  01/24/2023 tolerating ventilator wean overnight FiO2 50% down  01/25/2023 FiO2 down to 40% this AM -assist control  Awake and alert on vent minimal sedation  01/26/2023 stable on simv overnight but PCO2 trending up to 56.2 off AC mode  Awakening with sedation down  Cxr stable   Blood counts improved  01/27/2023 extubated this AM tolerated for short time  Developed acute respiratory distress O2 sats 60's minimally responive   Reintubated by anthesia  01/28/2023 abg stable on simv this Am   AWAKE AND ALERT  No more cough  Pain better   Tube feeds for the rec's talked to ivelisse will start slow  Not tolerated 1st time again  01/29/2023  No major change overnight remain on simv 30%   Abg ok this AM   01/30/2023  Unable to wean vent overnight  Tolerating tube feeds  01/31/2023 pt was extubated on 01/27, did not tolerate, unable to wean in last several days, working on LTAC placement.  Still on levophed 2.2 mcg, no other changes.  Feeding currently at 40 goal is 55.   Still on TPN, dietary recs are followed.  Still on diprivan at 35  02/01/2023 patient is weaned off of the Diprivan and she is much more awake 21 2023. Patient is off the sedation her to prevent has been weighing. Shes much more waking arousable.  02/2/2023 patient was admitted initially admitted on 01/15 with AFib with RVR she had abdominal pain constipation and anemia  as well she was given 2 units of packed red cells she had some increasing shortness of breath was placed on a 7 L Oxymizer on 01/19 overnight chest x-ray repeated showed pulmonary edema and she was plan for EGD on the 20th however she had increasing respiratory distress became more obtunded on the a.m. of 1/20 ABG showed hypercapnia she was intubated and transferred to ICU after a trial of BiPAP did not improve her hypercapnia she was admitted to ICU placed on broad-spectrum antibiotics repeat cultures were done chest x-ray she was weaned down she was extubated on 01/27 however after only about 20 minutes being off the vent she had significant stridorous respirations and drops in her sats down to 50% she was immediately reintubated and has been back on the vent she is now weaned back down to IMV settings she is awake and arousable and cooperative on the vent able to follow commands I spoke with Dr. Kinney at [facility] she will be transferred there for pulmonology services and further care as needed       Goals of Care Treatment Preferences:  Code Status: Full Code      Consults:   Consults (From admission, onward)        Status Ordering Provider     Inpatient consult to Registered Dietitian/Nutritionist  Once        Provider:  (Not yet assigned)    Completed MAYRA BRADFORD     Inpatient consult to Social Work/Case Management  Once        Provider:  (Not yet assigned)    Acknowledged ANA EL     Inpatient consult to General Surgery  Once        Provider:  Hipolito Abel MD    Acknowledged MAYRA BRADFORD     Inpatient consult to Registered Dietitian/Nutritionist  Once        Provider:  (Not yet assigned)    Completed MAYRA BRADFORD          No new Assessment & Plan notes have been filed under this hospital service since the last note was generated.  Service: Hospital Medicine    Final Active Diagnoses:    Diagnosis Date Noted POA    PRINCIPAL PROBLEM:  Acute respiratory  failure with hypoxia and hypercarbia [J96.01, J96.02] 01/22/2023 Yes    Severe sepsis with septic shock [A41.9, R65.21] 01/22/2023 Yes    Hypokalemia [E87.6] 01/22/2023 Yes    Atrial fibrillation with RVR [I48.91] 01/22/2023 Yes    Microcytic anemia [D50.9] 01/22/2023 Yes    Hematuria [R31.9] 01/22/2023 Yes    Transaminitis [R74.01] 01/22/2023 Yes    Recent weight loss [R63.4] 01/22/2023 Yes      Problems Resolved During this Admission:       Discharged Condition: fair    Disposition:     Follow Up:    Patient Instructions:   No discharge procedures on file.    Significant Diagnostic Studies: Labs:   BMP:   Recent Labs   Lab 02/01/23  0414 02/02/23  0347    137   K 4.1 4.2   CO2 35* 37*   BUN 24.0* 19.0   CREATININE 0.52* 0.59*   CALCIUM 8.1* 8.4   MG  --  2.20       Pending Diagnostic Studies:     Procedure Component Value Units Date/Time    Occult Blood, Stool 2nd Specimen [[id number]]     Order Status: Sent Lab Status: No result     Specimen: Stool     Occult Blood, Stool, Diagnostic (1-3) [[id number]] Collected: 01/23/23 0627    Order Status: Sent Lab Status: In process Updated: 01/23/23 0722    Specimen: Stool     Narrative:      The following orders were created for panel order Occult Blood, Stool, Diagnostic (1-3).  Procedure                               Abnormality         Status                     ---------                               -----------         ------                     Occult blood x 3, stool[[id number]]      Normal              Final result               Occult Blood, Stool 2nd ...[[id number]]                                                   Please view results for these tests on the individual orders.         Medications:  Reconciled Home Medications:      Medication List      ASK your doctor about these medications    albuterol 90 mcg/actuation inhaler  Commonly known as: PROVENTIL/VENTOLIN HFA  Inhale 1 puff into the lungs as needed for Wheezing.     aspirin 81 MG EC  tablet  Commonly known as: ECOTRIN  Take 81 mg by mouth every evening.     carvediloL 3.125 MG tablet  Commonly known as: COREG  Take 3.125 mg by mouth 2 (two) times daily.     DEXILANT 30 mg Cpdm  Generic drug: dexlansoprazole  Take 1 capsule by mouth once daily.     fluticasone propionate 50 mcg/actuation nasal spray  Commonly known as: FLONASE  1 spray by Each Nostril route 2 (two) times a day.     furosemide 20 MG tablet  Commonly known as: LASIX  Take 30 mg by mouth once daily.     LINZESS 145 mcg Cap capsule  Generic drug: linaCLOtide  Take 290 mcg by mouth once daily.     metFORMIN 500 MG tablet  Commonly known as: GLUCOPHAGE  Take 500 mg by mouth 2 (two) times daily.      mg Cap  Generic drug: acetylcysteine  Take 600 mg by mouth once daily.     oxybutynin 5 MG Tr24  Commonly known as: DITROPAN-XL  Take 5 mg by mouth once daily.     pregabalin 50 MG capsule  Commonly known as: LYRICA  Take 50 mg by mouth 3 (three) times daily.     rosuvastatin 40 MG Tab  Commonly known as: CRESTOR  Take 10 mg by mouth every evening.     theophylline 400 mg 24 hr capsule  Commonly known as: CHAN-24  Take 400 mg by mouth once daily.     TRELEGY ELLIPTA 100-62.5-25 mcg Dsdv  Generic drug: fluticasone-umeclidin-vilanter  Inhale 1 puff into the lungs once daily.            Indwelling Lines/Drains at time of discharge:   Lines/Drains/Airways     Drain  Duration                Urethral Catheter 01/20/23 0425 16 Fr. 13 days         NG/OG Tube 01/27/23 1240 orogastric 16 Fr. Center mouth 5 days          Airway  Duration                Airway - Non-Surgical 01/27/23 1230 5 days                Time spent on the discharge of patient: 45 minutes  Physical Exam  Constitutional:       General: She is not in acute distress.     Appearance: She is not ill-appearing.   HENT:      Head: Normocephalic and atraumatic.   Eyes:      General: No scleral icterus.     Extraocular Movements: Extraocular movements intact.   Cardiovascular:       Rate and Rhythm: Normal rate and regular rhythm.      Pulses: Normal pulses.      Heart sounds: Normal heart sounds.   Pulmonary:      Effort: Pulmonary effort is normal.      Breath sounds: Normal breath sounds.   Abdominal:      General: Bowel sounds are normal.      Palpations: Abdomen is soft.   Skin:     General: Skin is warm and dry.      Findings: No erythema, lesion or rash.       I had a face-to-face encounter with this patient prior to discharge    Megan Iglesias MD  Department of Hospital Medicine  [facility]

## 2023-02-02 NOTE — PLAN OF CARE
Problem: Adult Inpatient Plan of Care  Goal: Plan of Care Review  Outcome: Met  Goal: Patient-Specific Goal (Individualized)  Outcome: Met  Goal: Absence of Hospital-Acquired Illness or Injury  Outcome: Met  Goal: Optimal Comfort and Wellbeing  Outcome: Met  Goal: Readiness for Transition of Care  Outcome: Met     Problem: Infection  Goal: Absence of Infection Signs and Symptoms  Outcome: Met     Problem: Communication Impairment (Mechanical Ventilation, Invasive)  Goal: Effective Communication  Outcome: Met     Problem: Device-Related Complication Risk (Mechanical Ventilation, Invasive)  Goal: Optimal Device Function  Outcome: Met     Problem: Nutrition Impairment (Mechanical Ventilation, Invasive)  Goal: Optimal Nutrition Delivery  Outcome: Met     Problem: Skin and Tissue Injury (Mechanical Ventilation, Invasive)  Goal: Absence of Device-Related Skin and Tissue Injury  Outcome: Met     Problem: Ventilator-Induced Lung Injury (Mechanical Ventilation, Invasive)  Goal: Absence of Ventilator-Induced Lung Injury  Outcome: Met     Problem: Fall Injury Risk  Goal: Absence of Fall and Fall-Related Injury  Outcome: Met     Problem: Skin Injury Risk Increased  Goal: Skin Health and Integrity  Outcome: Met     Problem: Adjustment to Illness (Sepsis/Septic Shock)  Goal: Optimal Coping  Outcome: Met     Problem: Bleeding (Sepsis/Septic Shock)  Goal: Absence of Bleeding  Outcome: Met     Problem: Glycemic Control Impaired (Sepsis/Septic Shock)  Goal: Blood Glucose Level Within Desired Range  Outcome: Met     Problem: Infection Progression (Sepsis/Septic Shock)  Goal: Absence of Infection Signs and Symptoms  Outcome: Met     Problem: Nutrition Impaired (Sepsis/Septic Shock)  Goal: Optimal Nutrition Intake  Outcome: Met     Problem: Impaired Wound Healing  Goal: Optimal Wound Healing  Outcome: Met     Problem: Urinary Elimination Impairment  Goal: Effective Urinary Elimination/Continence  Outcome: Met

## 2023-02-02 NOTE — NURSING
BS Report given to Jim, paramedic, with [facility]. Discharged to [facility] ICU, room 902

## 2023-02-15 ENCOUNTER — TELEPHONE (OUTPATIENT)
Dept: FAMILY MEDICINE | Facility: CLINIC | Age: 68
End: 2023-02-15
Payer: MEDICARE

## 2023-02-15 NOTE — TELEPHONE ENCOUNTER
----- Message from Rommel Iqbal sent at 2/15/2023  3:41 PM CST -----  Regarding: Call Back  Pt was in [facility] and now has a trak and needs to be at a skilled nursing home, need to speak with pcp    [phone] [place]

## 2023-02-28 ENCOUNTER — HOSPITAL ENCOUNTER (INPATIENT)
Facility: HOSPITAL | Age: 68
LOS: 8 days | Discharge: HOME HEALTH | DRG: 208 | End: 2023-03-08
Attending: INTERNAL MEDICINE | Admitting: INTERNAL MEDICINE
Payer: MEDICARE

## 2023-02-28 DIAGNOSIS — R53.1 WEAKNESS: ICD-10-CM

## 2023-02-28 DIAGNOSIS — R65.21 SEVERE SEPSIS WITH SEPTIC SHOCK: ICD-10-CM

## 2023-02-28 DIAGNOSIS — J96.22 ACUTE ON CHRONIC RESPIRATORY FAILURE WITH HYPERCAPNIA: ICD-10-CM

## 2023-02-28 DIAGNOSIS — A41.9 SEVERE SEPSIS WITH SEPTIC SHOCK: ICD-10-CM

## 2023-02-28 DIAGNOSIS — J44.1 COPD EXACERBATION: Chronic | ICD-10-CM

## 2023-02-28 DIAGNOSIS — R06.89 CO2 NARCOSIS: ICD-10-CM

## 2023-02-28 DIAGNOSIS — L89.150 UNSTAGEABLE PRESSURE ULCER OF SACRAL REGION: Primary | ICD-10-CM

## 2023-02-28 LAB
ALBUMIN SERPL-MCNC: 2.8 G/DL (ref 3.4–4.8)
ALBUMIN/GLOB SERPL: 0.7 RATIO (ref 1.1–2)
ALLENS TEST: ABNORMAL
ALLENS TEST: ABNORMAL
ALP SERPL-CCNC: 71 UNIT/L (ref 40–150)
ALT SERPL-CCNC: 10 UNIT/L (ref 0–55)
AST SERPL-CCNC: 20 UNIT/L (ref 5–34)
BASOPHILS # BLD AUTO: 0.01 X10(3)/MCL (ref 0–0.2)
BASOPHILS NFR BLD AUTO: 0.1 %
BILIRUBIN DIRECT+TOT PNL SERPL-MCNC: 0.3 MG/DL
BNP BLD-MCNC: 84.1 PG/ML
BUN SERPL-MCNC: 12 MG/DL (ref 9.8–20.1)
CALCIUM SERPL-MCNC: 9.5 MG/DL (ref 8.4–10.2)
CHLORIDE SERPL-SCNC: 97 MMOL/L (ref 98–107)
CO2 SERPL-SCNC: 38 MMOL/L (ref 23–31)
CREAT SERPL-MCNC: 0.66 MG/DL (ref 0.55–1.02)
DELSYS: ABNORMAL
DELSYS: ABNORMAL
EOSINOPHIL # BLD AUTO: 0.17 X10(3)/MCL (ref 0–0.9)
EOSINOPHIL NFR BLD AUTO: 2 %
ERYTHROCYTE [DISTWIDTH] IN BLOOD BY AUTOMATED COUNT: 23.9 % (ref 11.5–17)
ERYTHROCYTE [SEDIMENTATION RATE] IN BLOOD BY WESTERGREN METHOD: 22 MM/H
ERYTHROCYTE [SEDIMENTATION RATE] IN BLOOD BY WESTERGREN METHOD: 28 MM/H
FIO2: 35
FIO2: 60
FLUAV AG UPPER RESP QL IA.RAPID: NOT DETECTED
FLUBV AG UPPER RESP QL IA.RAPID: NOT DETECTED
GFR SERPLBLD CREATININE-BSD FMLA CKD-EPI: >60 MLS/MIN/1.73/M2
GLOBULIN SER-MCNC: 4.1 GM/DL (ref 2.4–3.5)
GLUCOSE SERPL-MCNC: 134 MG/DL (ref 82–115)
HCO3 UR-SCNC: 43.2 MMOL/L (ref 24–28)
HCO3 UR-SCNC: 46.1 MMOL/L (ref 24–28)
HCT VFR BLD AUTO: 35.8 % (ref 37–47)
HGB BLD-MCNC: 10 G/DL (ref 12–16)
IMM GRANULOCYTES # BLD AUTO: 0.03 X10(3)/MCL (ref 0–0.04)
IMM GRANULOCYTES NFR BLD AUTO: 0.4 %
LACTATE SERPL-SCNC: 0.8 MMOL/L (ref 0.5–2.2)
LYMPHOCYTES # BLD AUTO: 1.05 X10(3)/MCL (ref 0.6–4.6)
LYMPHOCYTES NFR BLD AUTO: 12.3 %
MCH RBC QN AUTO: 24.8 PG
MCHC RBC AUTO-ENTMCNC: 27.9 G/DL (ref 33–36)
MCV RBC AUTO: 88.8 FL (ref 80–94)
MODE: ABNORMAL
MODE: ABNORMAL
MONOCYTES # BLD AUTO: 0.9 X10(3)/MCL (ref 0.1–1.3)
MONOCYTES NFR BLD AUTO: 10.6 %
NEUTROPHILS # BLD AUTO: 6.37 X10(3)/MCL (ref 2.1–9.2)
NEUTROPHILS NFR BLD AUTO: 74.6 %
PCO2 BLDA: 74.4 MMHG (ref 35–45)
PCO2 BLDA: 99.2 MMHG (ref 35–45)
PEEP: 5
PEEP: 5
PH SMN: 7.28 [PH] (ref 7.35–7.45)
PH SMN: 7.37 [PH] (ref 7.35–7.45)
PLATELET # BLD AUTO: 271 X10(3)/MCL (ref 130–400)
PMV BLD AUTO: 10.6 FL (ref 7.4–10.4)
PO2 BLDA: 166 MMHG (ref 80–100)
PO2 BLDA: 72 MMHG (ref 80–100)
POC BE: 18 MMOL/L
POC BE: 19 MMOL/L
POC SATURATED O2: 93 % (ref 95–100)
POC SATURATED O2: 99 % (ref 95–100)
POC TCO2: 45 MMOL/L (ref 23–27)
POC TCO2: 49 MMOL/L (ref 23–27)
POTASSIUM SERPL-SCNC: 4.7 MMOL/L (ref 3.5–5.1)
PROT SERPL-MCNC: 6.9 GM/DL (ref 5.8–7.6)
PS: 10
PS: 10
RBC # BLD AUTO: 4.03 X10(6)/MCL (ref 4.2–5.4)
SAMPLE: ABNORMAL
SAMPLE: ABNORMAL
SARS-COV-2 RNA RESP QL NAA+PROBE: NOT DETECTED
SITE: ABNORMAL
SITE: ABNORMAL
SODIUM SERPL-SCNC: 141 MMOL/L (ref 136–145)
TROPONIN I SERPL-MCNC: <0.01 NG/ML (ref 0–0.04)
VT: 300
VT: 300
WBC # SPEC AUTO: 8.5 X10(3)/MCL (ref 4.5–11.5)

## 2023-02-28 PROCEDURE — 0240U COVID/FLU A&B PCR: CPT | Performed by: PHYSICIAN ASSISTANT

## 2023-02-28 PROCEDURE — 99285 EMERGENCY DEPT VISIT HI MDM: CPT | Mod: 25

## 2023-02-28 PROCEDURE — 99900035 HC TECH TIME PER 15 MIN (STAT)

## 2023-02-28 PROCEDURE — 82803 BLOOD GASES ANY COMBINATION: CPT

## 2023-02-28 PROCEDURE — 27000221 HC OXYGEN, UP TO 24 HOURS

## 2023-02-28 PROCEDURE — 85025 COMPLETE CBC W/AUTO DIFF WBC: CPT | Performed by: PHYSICIAN ASSISTANT

## 2023-02-28 PROCEDURE — 83880 ASSAY OF NATRIURETIC PEPTIDE: CPT | Performed by: PHYSICIAN ASSISTANT

## 2023-02-28 PROCEDURE — 80053 COMPREHEN METABOLIC PANEL: CPT | Performed by: PHYSICIAN ASSISTANT

## 2023-02-28 PROCEDURE — 36600 WITHDRAWAL OF ARTERIAL BLOOD: CPT

## 2023-02-28 PROCEDURE — 63600175 PHARM REV CODE 636 W HCPCS: Performed by: INTERNAL MEDICINE

## 2023-02-28 PROCEDURE — 96374 THER/PROPH/DIAG INJ IV PUSH: CPT

## 2023-02-28 PROCEDURE — 93005 ELECTROCARDIOGRAM TRACING: CPT

## 2023-02-28 PROCEDURE — 94003 VENT MGMT INPAT SUBQ DAY: CPT

## 2023-02-28 PROCEDURE — 20000000 HC ICU ROOM

## 2023-02-28 PROCEDURE — 99900026 HC AIRWAY MAINTENANCE (STAT)

## 2023-02-28 PROCEDURE — 93010 ELECTROCARDIOGRAM REPORT: CPT | Mod: ,,, | Performed by: INTERNAL MEDICINE

## 2023-02-28 PROCEDURE — 83605 ASSAY OF LACTIC ACID: CPT | Performed by: PHYSICIAN ASSISTANT

## 2023-02-28 PROCEDURE — 84484 ASSAY OF TROPONIN QUANT: CPT | Performed by: PHYSICIAN ASSISTANT

## 2023-02-28 PROCEDURE — 93010 EKG 12-LEAD: ICD-10-PCS | Mod: ,,, | Performed by: INTERNAL MEDICINE

## 2023-02-28 RX ORDER — CEFTRIAXONE 1 G/1
1 INJECTION, POWDER, FOR SOLUTION INTRAMUSCULAR; INTRAVENOUS
Status: DISCONTINUED | OUTPATIENT
Start: 2023-02-28 | End: 2023-03-03

## 2023-02-28 RX ADMIN — CEFTRIAXONE SODIUM 1 G: 1 INJECTION, POWDER, FOR SOLUTION INTRAMUSCULAR; INTRAVENOUS at 08:02

## 2023-03-01 LAB
ALLENS TEST: ABNORMAL
ANION GAP SERPL CALC-SCNC: 6 MEQ/L
BASOPHILS # BLD AUTO: 0.02 X10(3)/MCL (ref 0–0.2)
BASOPHILS NFR BLD AUTO: 0.3 %
BUN SERPL-MCNC: 12 MG/DL (ref 9.8–20.1)
CALCIUM SERPL-MCNC: 8.9 MG/DL (ref 8.4–10.2)
CHLORIDE SERPL-SCNC: 96 MMOL/L (ref 98–107)
CO2 SERPL-SCNC: 38 MMOL/L (ref 23–31)
CREAT SERPL-MCNC: 0.65 MG/DL (ref 0.55–1.02)
CREAT/UREA NIT SERPL: 18
DELSYS: ABNORMAL
EOSINOPHIL # BLD AUTO: 0.05 X10(3)/MCL (ref 0–0.9)
EOSINOPHIL NFR BLD AUTO: 0.8 %
ERYTHROCYTE [DISTWIDTH] IN BLOOD BY AUTOMATED COUNT: 23.7 % (ref 11.5–17)
ERYTHROCYTE [SEDIMENTATION RATE] IN BLOOD BY WESTERGREN METHOD: 28 MM/H
FIO2: 45
GFR SERPLBLD CREATININE-BSD FMLA CKD-EPI: >60 MLS/MIN/1.73/M2
GLUCOSE SERPL-MCNC: 124 MG/DL (ref 82–115)
HCO3 UR-SCNC: 43.1 MMOL/L (ref 24–28)
HCT VFR BLD AUTO: 28 % (ref 37–47)
HGB BLD-MCNC: 7.8 G/DL (ref 12–16)
IMM GRANULOCYTES # BLD AUTO: 0.02 X10(3)/MCL (ref 0–0.04)
IMM GRANULOCYTES NFR BLD AUTO: 0.3 %
LYMPHOCYTES # BLD AUTO: 0.53 X10(3)/MCL (ref 0.6–4.6)
LYMPHOCYTES NFR BLD AUTO: 8.3 %
MCH RBC QN AUTO: 24.2 PG
MCHC RBC AUTO-ENTMCNC: 27.9 G/DL (ref 33–36)
MCV RBC AUTO: 87 FL (ref 80–94)
MODE: ABNORMAL
MONOCYTES # BLD AUTO: 0.67 X10(3)/MCL (ref 0.1–1.3)
MONOCYTES NFR BLD AUTO: 10.5 %
NEUTROPHILS # BLD AUTO: 5.07 X10(3)/MCL (ref 2.1–9.2)
NEUTROPHILS NFR BLD AUTO: 79.8 %
PCO2 BLDA: 68.1 MMHG (ref 35–45)
PEEP: 5
PH SMN: 7.41 [PH] (ref 7.35–7.45)
PLATELET # BLD AUTO: 197 X10(3)/MCL (ref 130–400)
PMV BLD AUTO: 10.9 FL (ref 7.4–10.4)
PO2 BLDA: 151 MMHG (ref 80–100)
POC BE: 18 MMOL/L
POC SATURATED O2: 99 % (ref 95–100)
POC TCO2: 45 MMOL/L (ref 23–27)
POCT GLUCOSE: 162 MG/DL (ref 70–110)
POCT GLUCOSE: 173 MG/DL (ref 70–110)
POCT GLUCOSE: 89 MG/DL (ref 70–110)
POTASSIUM SERPL-SCNC: 4.3 MMOL/L (ref 3.5–5.1)
PS: 10
RBC # BLD AUTO: 3.22 X10(6)/MCL (ref 4.2–5.4)
SAMPLE: ABNORMAL
SITE: ABNORMAL
SODIUM SERPL-SCNC: 140 MMOL/L (ref 136–145)
THEOPHYLLINE SERPL-MCNC: <2 UG/ML (ref 8–20)
VT: 300
WBC # SPEC AUTO: 6.4 X10(3)/MCL (ref 4.5–11.5)

## 2023-03-01 PROCEDURE — 36600 WITHDRAWAL OF ARTERIAL BLOOD: CPT

## 2023-03-01 PROCEDURE — 80048 BASIC METABOLIC PNL TOTAL CA: CPT | Performed by: INTERNAL MEDICINE

## 2023-03-01 PROCEDURE — 25000003 PHARM REV CODE 250: Performed by: INTERNAL MEDICINE

## 2023-03-01 PROCEDURE — 94761 N-INVAS EAR/PLS OXIMETRY MLT: CPT

## 2023-03-01 PROCEDURE — 80198 ASSAY OF THEOPHYLLINE: CPT | Performed by: INTERNAL MEDICINE

## 2023-03-01 PROCEDURE — 20000000 HC ICU ROOM

## 2023-03-01 PROCEDURE — 27000221 HC OXYGEN, UP TO 24 HOURS

## 2023-03-01 PROCEDURE — 99900026 HC AIRWAY MAINTENANCE (STAT)

## 2023-03-01 PROCEDURE — 82803 BLOOD GASES ANY COMBINATION: CPT

## 2023-03-01 PROCEDURE — 94640 AIRWAY INHALATION TREATMENT: CPT

## 2023-03-01 PROCEDURE — 63600175 PHARM REV CODE 636 W HCPCS: Performed by: INTERNAL MEDICINE

## 2023-03-01 PROCEDURE — 94003 VENT MGMT INPAT SUBQ DAY: CPT

## 2023-03-01 PROCEDURE — 99900035 HC TECH TIME PER 15 MIN (STAT)

## 2023-03-01 PROCEDURE — P9047 ALBUMIN (HUMAN), 25%, 50ML: HCPCS | Mod: JZ,JG | Performed by: INTERNAL MEDICINE

## 2023-03-01 PROCEDURE — 87070 CULTURE OTHR SPECIMN AEROBIC: CPT | Performed by: INTERNAL MEDICINE

## 2023-03-01 PROCEDURE — 63600175 PHARM REV CODE 636 W HCPCS: Mod: JZ,JG | Performed by: INTERNAL MEDICINE

## 2023-03-01 PROCEDURE — 25000242 PHARM REV CODE 250 ALT 637 W/ HCPCS: Performed by: INTERNAL MEDICINE

## 2023-03-01 PROCEDURE — 85025 COMPLETE CBC W/AUTO DIFF WBC: CPT | Performed by: INTERNAL MEDICINE

## 2023-03-01 RX ORDER — ATORVASTATIN CALCIUM 40 MG/1
80 TABLET, FILM COATED ORAL DAILY
Status: DISCONTINUED | OUTPATIENT
Start: 2023-03-01 | End: 2023-03-08 | Stop reason: HOSPADM

## 2023-03-01 RX ORDER — ALBUMIN HUMAN 250 G/1000ML
25 SOLUTION INTRAVENOUS ONCE
Status: COMPLETED | OUTPATIENT
Start: 2023-03-01 | End: 2023-03-01

## 2023-03-01 RX ORDER — ASPIRIN 81 MG/1
81 TABLET ORAL DAILY
Status: DISCONTINUED | OUTPATIENT
Start: 2023-03-01 | End: 2023-03-08 | Stop reason: HOSPADM

## 2023-03-01 RX ORDER — FUROSEMIDE 20 MG/1
20 TABLET ORAL DAILY
Status: DISCONTINUED | OUTPATIENT
Start: 2023-03-01 | End: 2023-03-08 | Stop reason: HOSPADM

## 2023-03-01 RX ORDER — AMIODARONE HYDROCHLORIDE 200 MG/1
400 TABLET ORAL 3 TIMES DAILY
Status: DISCONTINUED | OUTPATIENT
Start: 2023-03-01 | End: 2023-03-01

## 2023-03-01 RX ORDER — IBUPROFEN 200 MG
16 TABLET ORAL
Status: DISCONTINUED | OUTPATIENT
Start: 2023-03-01 | End: 2023-03-08 | Stop reason: HOSPADM

## 2023-03-01 RX ORDER — AMIODARONE HYDROCHLORIDE 200 MG/1
200 TABLET ORAL 2 TIMES DAILY
Status: DISCONTINUED | OUTPATIENT
Start: 2023-03-01 | End: 2023-03-08 | Stop reason: HOSPADM

## 2023-03-01 RX ORDER — INSULIN ASPART 100 [IU]/ML
0-5 INJECTION, SOLUTION INTRAVENOUS; SUBCUTANEOUS
Status: DISCONTINUED | OUTPATIENT
Start: 2023-03-01 | End: 2023-03-08 | Stop reason: HOSPADM

## 2023-03-01 RX ORDER — ACETAMINOPHEN 325 MG/1
650 TABLET ORAL EVERY 6 HOURS PRN
Status: DISCONTINUED | OUTPATIENT
Start: 2023-03-01 | End: 2023-03-08 | Stop reason: HOSPADM

## 2023-03-01 RX ORDER — BUDESONIDE 0.5 MG/2ML
0.5 INHALANT ORAL EVERY 12 HOURS
Status: DISCONTINUED | OUTPATIENT
Start: 2023-03-01 | End: 2023-03-01

## 2023-03-01 RX ORDER — ASCORBIC ACID 250 MG
500 TABLET ORAL 2 TIMES DAILY
Status: DISCONTINUED | OUTPATIENT
Start: 2023-03-01 | End: 2023-03-08 | Stop reason: HOSPADM

## 2023-03-01 RX ORDER — IPRATROPIUM BROMIDE AND ALBUTEROL SULFATE 2.5; .5 MG/3ML; MG/3ML
3 SOLUTION RESPIRATORY (INHALATION) EVERY 4 HOURS PRN
Status: DISCONTINUED | OUTPATIENT
Start: 2023-03-01 | End: 2023-03-08 | Stop reason: HOSPADM

## 2023-03-01 RX ORDER — BUDESONIDE 0.5 MG/2ML
0.5 INHALANT ORAL 2 TIMES DAILY
Status: DISCONTINUED | OUTPATIENT
Start: 2023-03-01 | End: 2023-03-08 | Stop reason: HOSPADM

## 2023-03-01 RX ORDER — IBUPROFEN 200 MG
24 TABLET ORAL
Status: DISCONTINUED | OUTPATIENT
Start: 2023-03-01 | End: 2023-03-08 | Stop reason: HOSPADM

## 2023-03-01 RX ORDER — ENOXAPARIN SODIUM 100 MG/ML
30 INJECTION SUBCUTANEOUS EVERY 24 HOURS
Status: DISCONTINUED | OUTPATIENT
Start: 2023-03-01 | End: 2023-03-08 | Stop reason: HOSPADM

## 2023-03-01 RX ORDER — MUPIROCIN 20 MG/G
OINTMENT TOPICAL 2 TIMES DAILY
Status: DISPENSED | OUTPATIENT
Start: 2023-03-01 | End: 2023-03-06

## 2023-03-01 RX ORDER — THEOPHYLLINE 400 MG/1
400 TABLET, EXTENDED RELEASE ORAL DAILY
Status: DISCONTINUED | OUTPATIENT
Start: 2023-03-01 | End: 2023-03-08 | Stop reason: HOSPADM

## 2023-03-01 RX ORDER — GLUCAGON 1 MG
1 KIT INJECTION
Status: DISCONTINUED | OUTPATIENT
Start: 2023-03-01 | End: 2023-03-08 | Stop reason: HOSPADM

## 2023-03-01 RX ADMIN — ACETAMINOPHEN 650 MG: 325 TABLET, FILM COATED ORAL at 09:03

## 2023-03-01 RX ADMIN — ACETAMINOPHEN 650 MG: 325 TABLET, FILM COATED ORAL at 03:03

## 2023-03-01 RX ADMIN — CEFTRIAXONE SODIUM 1 G: 1 INJECTION, POWDER, FOR SOLUTION INTRAMUSCULAR; INTRAVENOUS at 09:03

## 2023-03-01 RX ADMIN — AMIODARONE HYDROCHLORIDE 200 MG: 200 TABLET ORAL at 09:03

## 2023-03-01 RX ADMIN — MUPIROCIN 1 G: 20 OINTMENT TOPICAL at 09:03

## 2023-03-01 RX ADMIN — ENOXAPARIN SODIUM 30 MG: 30 INJECTION SUBCUTANEOUS at 04:03

## 2023-03-01 RX ADMIN — ALBUMIN (HUMAN) 25 G: 0.25 INJECTION, SOLUTION INTRAVENOUS at 10:03

## 2023-03-01 RX ADMIN — IPRATROPIUM BROMIDE AND ALBUTEROL SULFATE 3 ML: 2.5; .5 SOLUTION RESPIRATORY (INHALATION) at 08:03

## 2023-03-01 RX ADMIN — IPRATROPIUM BROMIDE AND ALBUTEROL SULFATE 3 ML: 2.5; .5 SOLUTION RESPIRATORY (INHALATION) at 07:03

## 2023-03-01 RX ADMIN — BUDESONIDE 0.5 MG: 0.5 INHALANT ORAL at 07:03

## 2023-03-01 RX ADMIN — BUDESONIDE 0.5 MG: 0.5 INHALANT RESPIRATORY (INHALATION) at 08:03

## 2023-03-01 RX ADMIN — IPRATROPIUM BROMIDE AND ALBUTEROL SULFATE 3 ML: 2.5; .5 SOLUTION RESPIRATORY (INHALATION) at 03:03

## 2023-03-01 RX ADMIN — Medication 500 MG: at 09:03

## 2023-03-01 RX ADMIN — ATORVASTATIN CALCIUM 80 MG: 40 TABLET, FILM COATED ORAL at 09:03

## 2023-03-01 RX ADMIN — ASPIRIN 81 MG: 81 TABLET, COATED ORAL at 09:03

## 2023-03-01 NOTE — CONSULTS
[facility] - ICU  Pulmonary Critical Care Note    Patient Name: Michel De La Paz  MRN: [id number]  Admission Date: 2/28/2023  Hospital Length of Stay: 1 days  Code Status: Full Code  Attending Provider: Kentrell Massey MD  Primary Care Provider: WESLY Best     Subjective:     HPI:  This is a tele medicine consultation via video.    Patient is a 67-year-old female with a history of severe COPD who was hospitalized approximately month ago for acute respiratory failure and subsequently had a tracheostomy placed.  She eventually return to her home with tracheostomy in place on supplemental oxygen via trach collar but no ventilator support.     Yesterday she developed weakness and fell.  She was subsequently brought to the hospital.  She was placed on ventilator support with an initial arterial blood gas of pH 7.27 and a pCO2 of 99.  Overnight she has been supported in the ICU with an IMV rate of 28 and correction of her acidosis.  pCO2 this morning at 68 which is probably her baseline.  She is awake and alert.  Respiratory therapy indicates positive creamy endotracheal secretions.      Hospital Course/Significant events:  N/A    24 Hour Interval History:  As above    Past Medical History:   Diagnosis Date    CHF (congestive heart failure)     Chronic low back pain     COPD (chronic obstructive pulmonary disease)     Diabetes mellitus, type 2     Diverticular disease of large intestine without perforation or abscess     DM (diabetes mellitus)     GERD (gastroesophageal reflux disease)     Hypertension     Mild intermittent asthma, uncomplicated     Neuropathy     Respiratory failure     Sleep apnea        Past Surgical History:   Procedure Laterality Date    ARTHROSCOPY OF KNEE      BACK SURGERY      BREAST SURGERY      CARPAL TUNNEL RELEASE      CATARACT EXTRACTION      COLONOSCOPY  02/28/2020    HYSTERECTOMY      LEFT LUNG      MICROLARYNGOSCOPY WITH BIOPSY OF VOCAL CORD  2016       Social  History     Socioeconomic History    Marital status: Unknown   Tobacco Use    Smoking status: Never   Substance and Sexual Activity    Alcohol use: Never           Current Outpatient Medications   Medication Instructions    acetaminophen (TYLENOL) 650 mg, Oral, Every 4 hours PRN    acetaminophen (TYLENOL) 650 mg, Rectal, Every 4 hours PRN    amiodarone (PACERONE) 400 mg, Oral, 3 times daily    ascorbic acid (vitamin C) (VITAMIN C) 500 mg, Oral, 2 times daily    atorvastatin (LIPITOR) 80 mg, Oral, Daily    budesonide (PULMICORT) 0.5 mg, Nebulization, Every 12 hours, Controller    furosemide (LASIX) 20 mg, Oral, Daily    linaCLOtide (LINZESS) 290 mcg, Oral, Before breakfast    metFORMIN (GLUCOPHAGE) 500 mg, Oral, 2 times daily with meals    theophylline 400 mg, Oral, Daily       Current Inpatient Medications   amiodarone  400 mg Oral TID    ascorbic acid (vitamin C)  500 mg Oral BID    aspirin  81 mg Oral Daily    atorvastatin  80 mg Oral Daily    budesonide  0.5 mg Nebulization Q12H    cefTRIAXone  1 g Intravenous Q24H    enoxaparin  30 mg Subcutaneous Daily    furosemide  20 mg Oral Daily    mupirocin   Nasal BID    theophylline  400 mg Oral Daily       Current Intravenous Infusions        ROS   A difficult to obtain.  Patient indicates that she is without dyspnea this morning.    Objective:       Intake/Output Summary (Last 24 hours) at 3/1/2023 0910  Last data filed at 3/1/2023 0450  Gross per 24 hour   Intake 240 ml   Output 75 ml   Net 165 ml         Vital Signs (Most Recent):  Temp: 97.9 °F (36.6 °C) (03/01/23 0301)  Pulse: 68 (03/01/23 0816)  Resp: (!) 24 (03/01/23 0816)  BP: (!) 98/55 (03/01/23 0803)  SpO2: 98 % (03/01/23 0816)    Body mass index is 21.57 kg/m².  Weight: 57 kg (125 lb 10.6 oz) Vital Signs (24h Range):  Temp:  [97.9 °F (36.6 °C)-98.2 °F (36.8 °C)] 97.9 °F (36.6 °C)  Pulse:  [58-85] 68  Resp:  [6-29] 24  SpO2:  [72 %-100 %] 98 %  BP: ()/(42-71) 98/55     Physical Exam  Constitutional:        General: She is not in acute distress.     Appearance: Normal appearance. She is not ill-appearing, toxic-appearing or diaphoretic.      Comments: Patient sitting up in the bed awake and alert.   HENT:      Head: Normocephalic and atraumatic.      Nose: Nose normal.   Cardiovascular:      Rate and Rhythm: Normal rate and regular rhythm.      Pulses: Normal pulses.      Heart sounds: No murmur heard.    No gallop.   Pulmonary:      Breath sounds: No stridor. No wheezing, rhonchi or rales.      Comments: Described diminished BS bilateral without wheezing  Chest:      Chest wall: No tenderness.   Abdominal:      General: There is no distension.      Palpations: There is no mass.      Tenderness: There is no abdominal tenderness. There is no guarding.   Musculoskeletal:         General: No swelling, tenderness or deformity.      Cervical back: No rigidity or tenderness.      Right lower leg: Edema present.      Left lower leg: Edema present.      Comments: Trace to 1+ bilateral edema   Lymphadenopathy:      Cervical: No cervical adenopathy.   Skin:     Coloration: Skin is not jaundiced.   Neurological:      General: No focal deficit present.      Mental Status: She is alert.      Cranial Nerves: No cranial nerve deficit.      Motor: No weakness.   Psychiatric:         Mood and Affect: Mood normal.         Lines/Drains/Airways       Drain  Duration             Female External Urinary Catheter 02/28/23 2130 <1 day              Airway  Duration                  Airway - Non-Surgical 01/27/23 1230 32 days              Peripheral Intravenous Line  Duration                  Peripheral IV - Single Lumen 22 G Left Antecubital -- days         Peripheral IV - Single Lumen 02/28/23 1729 20 G Anterior;Right Upper Arm <1 day                    Significant Labs:    Lab Results   Component Value Date    WBC 6.4 03/01/2023    HGB 7.8 (L) 03/01/2023    HCT 28.0 (L) 03/01/2023    MCV 87.0 03/01/2023     03/01/2023          BMP  Lab Results   Component Value Date     03/01/2023    K 4.3 03/01/2023    CHLORIDE 96 (L) 03/01/2023    CO2 38 (H) 03/01/2023    BUN 12.0 03/01/2023    CREATININE 0.65 03/01/2023    CALCIUM 8.9 03/01/2023    AGAP 6.0 03/01/2023    EGFRNONAA 88 05/06/2022    EGFRNONAA 88 05/06/2022       ABG  Recent Labs   Lab 03/01/23 0428   PH 7.409   PO2 151*   PCO2 68.1*   HCO3 43.1*   BE 18       Mechanical Ventilation Support:  Vent Mode: SIMV (03/01/23 0816)  Ventilator Initiated: Yes (02/28/23 1820)  Set Rate: 28 BPM (03/01/23 0816)  Vt Set: 300 mL (03/01/23 0816)  Pressure Support: 10 cmH20 (03/01/23 0816)  PEEP/CPAP: 5 cmH20 (03/01/23 0816)  Oxygen Concentration (%): 30 (03/01/23 0816)  Peak Airway Pressure: 28 cmH20 (03/01/23 0816)  Plateau Pressure: 0 cmH20 (03/01/23 0816)  Total Ve: 8.34 L/m (03/01/23 0816)  Negative Inspiratory Force (cm H2O): 0 (03/01/23 0400)  F/VT Ratio<105 (RSBI): (!) 82.19 (03/01/23 0816)    Significant Imaging:  I have reviewed the pertinent imaging within the past 24 hours.    Chest x-ray has tracheostomy in place.  There is mild diffuse bilateral interstitial infiltrates.  Cardiac silhouette is normal and there is hyperinflation with flattened diaphragms    Assessment/Plan:     Assessment  Severe COPD with acute exacerbation.  Markedly improved overnight with ventilator support.  Bronchitis versus early pneumonia  Diabetes mellitus      Reccs  Would suggest CPAP trial as tolerated.  If she does well this can be switched to a trach collar.  I suspect she will do well today but may need some ventilator support overnight.  Reduce FiO2 to 30%  Collect sputum for Gram stain and culture.  Empiric antibiotic of Rocephin to be adjusted according to culture  Would suggest q.6 hours DuoNeb therapy  Continue home Pulmicort  With marked improvement overnight I would hold on systemic steroids  Enteral nutrition  She may benefit from a trilogy device to be used at home at night and during  periods of dyspnea or fatigue.  She would also probably benefit from a Lama and Laba upon discharge  Suggest outpatient follow-up with pulmonary physician  Physical therapy as tolerated      DVT Prophylaxis:  Lovenox  GI Prophylaxis:     32 minutes of critical care was time spent personally by me on the following activities: development of treatment plan with patient or surrogate and bedside caregivers, discussions with consultants, evaluation of patient's response to treatment, examination of patient, ordering and performing treatments and interventions, ordering and review of laboratory studies, ordering and review of radiographic studies, pulse oximetry, re-evaluation of patient's condition.  This critical care time did not overlap with that of any other provider or involve time for any procedures.     Durga Dahl MD  Pulmonary Critical Care Medicine  [facility]

## 2023-03-01 NOTE — NURSING
Dr Davenport informed of patient's LBP and order for Amiodarone  400 mg po tid , furosemide 20 gm po. Order noted for Albumin 25 GM IV X 1 dose and he will evaluate meds on rounds

## 2023-03-01 NOTE — ED PROVIDER NOTES
Encounter Date: 2/28/2023  History limited due patient having tracheostomy     History     Chief Complaint   Patient presents with    Fatigue     Weakness the past few days. Patient on home o2 w/ trach but wasn't wearing it when ems arrived.       HPI   Patient is brought to the emergency room with complaint of generalized weakness for the last few days patient is on home oxygen recent tracheostomy for respiratory failure and the has been having generalized weakness, when I asked if she is hurting anywhere she denies by noting the head and negative, complains of just weakness.  Review of patient's allergies indicates:   Allergen Reactions    Benadryl [diphenhydramine hcl]     Darvocet a500 [propoxyphene n-acetaminophen]      Past Medical History:   Diagnosis Date    CHF (congestive heart failure)     Chronic low back pain     COPD (chronic obstructive pulmonary disease)     Diabetes mellitus, type 2     Diverticular disease of large intestine without perforation or abscess     DM (diabetes mellitus)     GERD (gastroesophageal reflux disease)     Hypertension     Mild intermittent asthma, uncomplicated     Neuropathy     Respiratory failure     Sleep apnea      Past Surgical History:   Procedure Laterality Date    ARTHROSCOPY OF KNEE      BACK SURGERY      BREAST SURGERY      CARPAL TUNNEL RELEASE      CATARACT EXTRACTION      COLONOSCOPY  02/28/2020    HYSTERECTOMY      LEFT LUNG      MICROLARYNGOSCOPY WITH BIOPSY OF VOCAL CORD  2016     Family History   Problem Relation Age of Onset    Aneurysm Mother     Heart failure Mother     Diabetes type I Father     Kidney disease Father     Prostate cancer Father     Hypertension Brother     Kidney disease Brother      Social History     Tobacco Use    Smoking status: Never   Substance Use Topics    Alcohol use: Never     Review of Systems   Unable to perform ROS: Patient nonverbal     Physical Exam     Initial Vitals [02/28/23 1708]   BP Pulse Resp Temp SpO2   114/60 85 18  98.2 °F (36.8 °C) 100 %      MAP       --         Physical Exam    Nursing note and vitals reviewed.  Constitutional:   Thin built patient   HENT:   Head: Atraumatic.   Eyes: EOM are normal.   Neck:   Tracheostomy is in place with balloon inflated   Cardiovascular:  Normal rate and regular rhythm.           Pulmonary/Chest: Breath sounds normal.   Abdominal: Abdomen is soft. Bowel sounds are normal.     Neurological: She is alert.   Skin: Skin is warm and dry.   Psychiatric: She has a normal mood and affect.       ED Course   Critical Care    Date/Time: 2/28/2023 8:22 PM  Performed by: Nallely Girard MD  Authorized by: Nallely Girard MD   Direct patient critical care time: 60 minutes  Total critical care time (exclusive of procedural time) : 60 minutes  Critical care was necessary to treat or prevent imminent or life-threatening deterioration of the following conditions: respiratory failure.  Critical care was time spent personally by me on the following activities: development of treatment plan with patient or surrogate, discussions with consultants, evaluation of patient's response to treatment, examination of patient, obtaining history from patient or surrogate, ventilator management, pulse oximetry, ordering and review of radiographic studies, ordering and review of laboratory studies, ordering and performing treatments and interventions and re-evaluation of patient's condition.      Labs Reviewed   COMPREHENSIVE METABOLIC PANEL - Abnormal; Notable for the following components:       Result Value    Chloride 97 (*)     Carbon Dioxide 38 (*)     Glucose Level 134 (*)     Albumin Level 2.8 (*)     Globulin 4.1 (*)     Albumin/Globulin Ratio 0.7 (*)     All other components within normal limits   CBC WITH DIFFERENTIAL - Abnormal; Notable for the following components:    RBC 4.03 (*)     Hgb 10.0 (*)     Hct 35.8 (*)     MCHC 27.9 (*)     RDW 23.9 (*)     MPV 10.6 (*)     All other components within normal  limits   ISTAT PROCEDURE - Abnormal; Notable for the following components:    POC PH 7.275 (*)     POC PCO2 99.2 (*)     POC PO2 166 (*)     POC HCO3 46.1 (*)     POC TCO2 49 (*)     All other components within normal limits   ISTAT PROCEDURE - Abnormal; Notable for the following components:    POC PCO2 74.4 (*)     POC PO2 72 (*)     POC HCO3 43.2 (*)     POC SATURATED O2 93 (*)     POC TCO2 45 (*)     All other components within normal limits   B-TYPE NATRIURETIC PEPTIDE - Normal   TROPONIN I - Normal   COVID/FLU A&B PCR - Normal    Narrative:     The Xpert Xpress SARS-CoV-2/FLU/RSV plus is a rapid, multiplexed real-time PCR test intended for the simultaneous qualitative detection and differentiation of SARS-CoV-2, Influenza A, Influenza B, and respiratory syncytial virus (RSV) viral RNA in either nasopharyngeal swab or nasal swab specimens.         LACTIC ACID, PLASMA - Normal   CBC W/ AUTO DIFFERENTIAL    Narrative:     The following orders were created for panel order CBC auto differential.  Procedure                               Abnormality         Status                     ---------                               -----------         ------                     CBC with Differential[[id number]]        Abnormal            Final result                 Please view results for these tests on the individual orders.   URINALYSIS, REFLEX TO URINE CULTURE        ECG Results              EKG 12-lead (Final result)  Result time 02/28/23 23:58:21      Wet Read by Nallely Girard MD (02/28/23 23:58:21, [facility] - Emergency Dept, Emergency Medicine)    EKG: Independently reviewed and / or Interpreted by Nallely Girard MD. independently as Normal Sinus Rhythm, Rate 83, Normal Intervals., No STEMI, pulmonary disease pattern., Right Axis Deviation                        Final result by Interface, Lab In [facility] (02/28/23 22:10:21)                   Narrative:    Test Reason : R53.1,    Vent. Rate : 083 BPM      Atrial Rate : 083 BPM     P-R Int : 208 ms          QRS Dur : 104 ms      QT Int : 400 ms       P-R-T Axes : 087 209 074 degrees     QTc Int : 470 ms    Normal sinus rhythm  Right superior axis deviation  Pulmonary disease pattern  Abnormal ECG  No previous ECGs available  Confirmed by Jody BHAGAT, Michele ([id number]) on 2/28/2023 10:10:11 PM    Referred By: [email or web address]   SELF           Confirmed By:Michele Vera MD                                  Imaging Results              X-Ray Chest AP Portable (Preliminary result)  Result time 02/28/23 23:57:47      Wet Read by Nallely Girard MD (02/28/23 23:57:47, [facility] - Emergency Dept, Emergency Medicine)    Chest One View:  Independently reviewed and/or interpreted by Nallely Girard MD.  No Focal Consolidation, COPD, increased interstitial markings bilaterally                                     Medications   cefTRIAXone injection 1 g (1 g Intravenous Given 2/28/23 2042)     Medical Decision Making:   Initial Assessment:     Patient's history of COPD, had respiratory failure was recently admitted in the hospital and had a tracheostomy done, recently went back home in his today brought to the emergency room with generalized weakness.  Patient is unable to offer any complaints,  Clinical Tests:   Lab Tests: Ordered and Reviewed  Radiological Study: Ordered and Reviewed  ED Management:   Patient's blood gas shows that patient  has severe respiratory acidosis with hypercapnia CO2 of 130,     We were able to pull the inner cannula out which had a mucus plug, cleaned it out, I will put her on the ventilator for the time being through her tracheostomy.  I am going to repeat the blood gas again in 30 minutes and decide further.           ED Course as of 02/28/23 2358 Tue Feb 28, 2023 1953 Patient's pH has the come up, pCO2 has come down, her oxygenation is not a problem, we were able to take the inner cannula out and cleaned it properly, thick mucus which we suctioned  out, she is doing better, we essentially need to put her in the ICU on the vent till she improves.  Unfortunately we do not have any ICU beds available in the hospital at this time, I will have to transfer her to a place where she can be put in the ICU otherwise rest of the workup is essentially negative at this time. [GQ]   2020 One of the beds in the ICU became available, I decided to cancel the transfer request, I talked to Dr. Massey who is okay with admission. [GQ]   2021 I will keep the patient in the emergency room and manage her till the bed becomes available in ICU and then move her to ICU. [GQ]   2214 Patient's blood gas is improving, is her pCO2 is coming down, her PO2 also came down so we went ahead and increase the FiO2 on the vent.  I will go ahead and admit her in the ICU for further management I will let her be on the same vent setting as her pCO2 is going in the right direction, her pH is in safe range now. [GQ]      ED Course User Index  [GQ] Nallely Girard MD                 Clinical Impression:   Final diagnoses:  [R53.1] Weakness  [J96.22] Acute on chronic respiratory failure with hypercapnia (Primary)  [R06.89] CO2 narcosis        ED Disposition Condition    Admit Stable                Nallely Girard MD  02/28/23 5718

## 2023-03-01 NOTE — HPI
68 yo female with hx of Ch resp failure with trach collar, NIDDM, HTN, CHF, Parox Fib, COPD who had prolong hospitalization at [facility] then at [facility], Patient is brought to the emergency room with complaint of generalized weakness for the last few days patient is on home oxygen recent tracheostomy for respiratory failure she had a fall she was found to have elevated Co2 she was placed on Vent with improvement of Resp acidosis. She C/O fall and Left hip pain. No chest pain, No cough, no Fever, no chills

## 2023-03-01 NOTE — SUBJECTIVE & OBJECTIVE
Past Medical History:   Diagnosis Date    CHF (congestive heart failure)     Chronic low back pain     COPD (chronic obstructive pulmonary disease)     Diabetes mellitus, type 2     Diverticular disease of large intestine without perforation or abscess     DM (diabetes mellitus)     GERD (gastroesophageal reflux disease)     Hypertension     Mild intermittent asthma, uncomplicated     Neuropathy     Respiratory failure     Sleep apnea        Past Surgical History:   Procedure Laterality Date    ARTHROSCOPY OF KNEE      BACK SURGERY      BREAST SURGERY      CARPAL TUNNEL RELEASE      CATARACT EXTRACTION      COLONOSCOPY  02/28/2020    HYSTERECTOMY      LEFT LUNG      MICROLARYNGOSCOPY WITH BIOPSY OF VOCAL CORD  2016       Review of patient's allergies indicates:   Allergen Reactions    Benadryl [diphenhydramine hcl]     Darvocet a500 [propoxyphene n-acetaminophen]        No current facility-administered medications on file prior to encounter.     Current Outpatient Medications on File Prior to Encounter   Medication Sig    acetaminophen (TYLENOL) 325 MG tablet Take 2 tablets (650 mg total) by mouth every 4 (four) hours as needed.    acetaminophen (TYLENOL) 650 MG Supp Place 1 suppository (650 mg total) rectally every 4 (four) hours as needed (101 OR HEADACHE).    amiodarone (PACERONE) 400 MG tablet Take 1 tablet (400 mg total) by mouth 3 (three) times daily.    ascorbic acid, vitamin C, (VITAMIN C) 500 MG tablet Take 1 tablet (500 mg total) by mouth 2 (two) times daily.    atorvastatin (LIPITOR) 80 MG tablet Take 1 tablet (80 mg total) by mouth once daily.    budesonide (PULMICORT) 0.5 mg/2 mL nebulizer solution Take 2 mLs (0.5 mg total) by nebulization every 12 (twelve) hours. Controller    furosemide (LASIX) 20 MG tablet Take 1 tablet (20 mg total) by mouth once daily.    linaCLOtide (LINZESS) 290 mcg Cap capsule Take 1 capsule (290 mcg total) by mouth before breakfast.    metFORMIN (GLUCOPHAGE) 500 MG tablet Take  1 tablet (500 mg total) by mouth 2 (two) times daily with meals.    theophylline 400 mg Tb24 Take 1 tablet (400 mg total) by mouth once daily.     Family History       Problem Relation (Age of Onset)    Aneurysm Mother    Diabetes type I Father    Heart failure Mother    Hypertension Brother    Kidney disease Father, Brother    Prostate cancer Father          Tobacco Use    Smoking status: Never    Smokeless tobacco: Not on file   Substance and Sexual Activity    Alcohol use: Never    Drug use: Not on file    Sexual activity: Not on file     Review of Systems   Constitutional:  Positive for fatigue.   HENT: Negative.     Eyes: Negative.    Respiratory:  Positive for shortness of breath.    Cardiovascular: Negative.    Gastrointestinal: Negative.    Endocrine: Negative.    Genitourinary: Negative.    Musculoskeletal:  Positive for arthralgias.        Left hip pain   Skin: Negative.    Allergic/Immunologic: Negative.    Neurological:  Positive for weakness.   Hematological: Negative.    Psychiatric/Behavioral: Negative.     Objective:     Vital Signs (Most Recent):  Temp: 98.1 °F (36.7 °C) (03/01/23 0045)  Pulse: 67 (03/01/23 0100)  Resp: (!) 28 (03/01/23 0100)  BP: (!) 79/45 (03/01/23 0100)  SpO2: 100 % (03/01/23 0100)   Vital Signs (24h Range):  Temp:  [98.1 °F (36.7 °C)-98.2 °F (36.8 °C)] 98.1 °F (36.7 °C)  Pulse:  [63-85] 67  Resp:  [6-29] 28  SpO2:  [72 %-100 %] 100 %  BP: ()/(45-71) 79/45     Weight: 57 kg (125 lb 10.6 oz)  Body mass index is 21.57 kg/m².    Physical Exam  Constitutional:       Appearance: Normal appearance. She is normal weight.   HENT:      Head: Normocephalic and atraumatic.      Mouth/Throat:      Mouth: Mucous membranes are dry.      Pharynx: Oropharynx is clear.   Eyes:      Extraocular Movements: Extraocular movements intact.      Conjunctiva/sclera: Conjunctivae normal.      Pupils: Pupils are equal, round, and reactive to light.   Cardiovascular:      Rate and Rhythm: Normal rate  and regular rhythm.      Pulses: Normal pulses.      Heart sounds: Normal heart sounds.   Pulmonary:      Effort: Pulmonary effort is normal.      Breath sounds: Normal breath sounds.      Comments: Trach collar in place  Abdominal:      General: Abdomen is flat. Bowel sounds are normal.      Palpations: Abdomen is soft.   Musculoskeletal:         General: Normal range of motion.      Cervical back: Normal range of motion and neck supple.      Comments: Left hip pain unable to move it   Skin:     General: Skin is warm and dry.   Neurological:      General: No focal deficit present.      Mental Status: She is alert and oriented to person, place, and time. Mental status is at baseline.   Psychiatric:         Mood and Affect: Mood normal.         CRANIAL NERVES     CN III, IV, VI   Pupils are equal, round, and reactive to light.     Significant Labs: All pertinent labs within the past 24 hours have been reviewed.  A1C: No results for input(s): HGBA1C in the last 4320 hours.  ABGs:   Recent Labs   Lab 02/28/23  1909 02/28/23  2105   PH 7.275* 7.371   PCO2 99.2* 74.4*   HCO3 46.1* 43.2*   POCSATURATED 99 93*   BE 19 18   PO2 166* 72*     Bilirubin:   Recent Labs   Lab 02/28/23  1732   BILITOT 0.3     Blood Culture: No results for input(s): LABBLOO in the last 48 hours.  BMP:   Recent Labs   Lab 02/28/23  1732      K 4.7   CO2 38*   BUN 12.0   CREATININE 0.66   CALCIUM 9.5     CBC:   Recent Labs   Lab 02/28/23  1732   WBC 8.5   HGB 10.0*   HCT 35.8*        CMP:   Recent Labs   Lab 02/28/23  1732      K 4.7   CO2 38*   BUN 12.0   CREATININE 0.66   CALCIUM 9.5   ALBUMIN 2.8*   BILITOT 0.3   ALKPHOS 71   AST 20   ALT 10     Cardiac Markers:   Recent Labs   Lab 02/28/23  1732   BNP 84.1     Coagulation: No results for input(s): PT, INR, APTT in the last 48 hours.  Lactic Acid: No results for input(s): LACTATE in the last 48 hours.  Lipase: No results for input(s): LIPASE in the last 48 hours.  Lipid Panel:  No results for input(s): CHOL, HDL, LDLCALC, TRIG, CHOLHDL in the last 48 hours.  Magnesium: No results for input(s): MG in the last 48 hours.  Pathology Results  (Last 10 years)      None          POCT Glucose: No results for input(s): POCTGLUCOSE in the last 48 hours.  Prealbumin: No results for input(s): PREALBUMIN in the last 48 hours.  Respiratory Culture: No results for input(s): GSRESP, RESPIRATORYC in the last 48 hours.  Troponin:   Recent Labs   Lab 02/28/23  1732   TROPONINI <0.010     TSH: No results for input(s): TSH in the last 4320 hours.  Urine Culture: No results for input(s): LABURIN in the last 48 hours.  Urine Studies: No results for input(s): COLORU, APPEARANCEUA, PHUR, SPECGRAV, PROTEINUA, GLUCUA, KETONESU, BILIRUBINUA, OCCULTUA, NITRITE, UROBILINOGEN, LEUKOCYTESUR, RBCUA, WBCUA, BACTERIA, SQUAMEPITHEL, HYALINECASTS in the last 48 hours.    Invalid input(s): WRIGHTSUR    Significant Imaging:    CXR no acute finding

## 2023-03-01 NOTE — H&P
[facility] - ICU Hospital Medicine  History & Physical    Patient Name: Michel De La Paz  MRN: [id number]  Patient Class: IP- Inpatient  Admission Date: 2/28/2023  Attending Physician: Kali Hardin MD  Primary Care Provider: WESLY Best         Patient information was obtained from via telemed    Subjective:     Principal Problem:<principal problem not specified>    Chief Complaint:   Chief Complaint   Patient presents with    Fatigue     Weakness the past few days. Patient on home o2 w/ trach but wasn't wearing it when ems arrived.          HPI:  66 yo female with hx of Ch resp failure with trach collar, NIDDM, HTN, CHF, Parox Fib, COPD who had prolong hospitalization at [facility] then at [facility], Patient is brought to the emergency room with complaint of generalized weakness for the last few days patient is on home oxygen recent tracheostomy for respiratory failure she had a fall she was found to have elevated Co2 she was placed on Vent with improvement of Resp acidosis. She C/O fall and Left hip pain. No chest pain, No cough, no Fever, no chills      Past Medical History:   Diagnosis Date    CHF (congestive heart failure)     Chronic low back pain     COPD (chronic obstructive pulmonary disease)     Diabetes mellitus, type 2     Diverticular disease of large intestine without perforation or abscess     DM (diabetes mellitus)     GERD (gastroesophageal reflux disease)     Hypertension     Mild intermittent asthma, uncomplicated     Neuropathy     Respiratory failure     Sleep apnea        Past Surgical History:   Procedure Laterality Date    ARTHROSCOPY OF KNEE      BACK SURGERY      BREAST SURGERY      CARPAL TUNNEL RELEASE      CATARACT EXTRACTION      COLONOSCOPY  02/28/2020    HYSTERECTOMY      LEFT LUNG      MICROLARYNGOSCOPY WITH BIOPSY OF VOCAL CORD  2016       Review of patient's allergies indicates:   Allergen Reactions    Benadryl [diphenhydramine hcl]     Darvocet a500  [propoxyphene n-acetaminophen]        No current facility-administered medications on file prior to encounter.     Current Outpatient Medications on File Prior to Encounter   Medication Sig    acetaminophen (TYLENOL) 325 MG tablet Take 2 tablets (650 mg total) by mouth every 4 (four) hours as needed.    acetaminophen (TYLENOL) 650 MG Supp Place 1 suppository (650 mg total) rectally every 4 (four) hours as needed (101 OR HEADACHE).    amiodarone (PACERONE) 400 MG tablet Take 1 tablet (400 mg total) by mouth 3 (three) times daily.    ascorbic acid, vitamin C, (VITAMIN C) 500 MG tablet Take 1 tablet (500 mg total) by mouth 2 (two) times daily.    atorvastatin (LIPITOR) 80 MG tablet Take 1 tablet (80 mg total) by mouth once daily.    budesonide (PULMICORT) 0.5 mg/2 mL nebulizer solution Take 2 mLs (0.5 mg total) by nebulization every 12 (twelve) hours. Controller    furosemide (LASIX) 20 MG tablet Take 1 tablet (20 mg total) by mouth once daily.    linaCLOtide (LINZESS) 290 mcg Cap capsule Take 1 capsule (290 mcg total) by mouth before breakfast.    metFORMIN (GLUCOPHAGE) 500 MG tablet Take 1 tablet (500 mg total) by mouth 2 (two) times daily with meals.    theophylline 400 mg Tb24 Take 1 tablet (400 mg total) by mouth once daily.     Family History       Problem Relation (Age of Onset)    Aneurysm Mother    Diabetes type I Father    Heart failure Mother    Hypertension Brother    Kidney disease Father, Brother    Prostate cancer Father          Tobacco Use    Smoking status: Never    Smokeless tobacco: Not on file   Substance and Sexual Activity    Alcohol use: Never    Drug use: Not on file    Sexual activity: Not on file     Review of Systems   Constitutional:  Positive for fatigue.   HENT: Negative.     Eyes: Negative.    Respiratory:  Positive for shortness of breath.    Cardiovascular: Negative.    Gastrointestinal: Negative.    Endocrine: Negative.    Genitourinary: Negative.    Musculoskeletal:  Positive for  arthralgias.        Left hip pain   Skin: Negative.    Allergic/Immunologic: Negative.    Neurological:  Positive for weakness.   Hematological: Negative.    Psychiatric/Behavioral: Negative.     Objective:     Vital Signs (Most Recent):  Temp: 98.1 °F (36.7 °C) (03/01/23 0045)  Pulse: 67 (03/01/23 0100)  Resp: (!) 28 (03/01/23 0100)  BP: (!) 79/45 (03/01/23 0100)  SpO2: 100 % (03/01/23 0100)   Vital Signs (24h Range):  Temp:  [98.1 °F (36.7 °C)-98.2 °F (36.8 °C)] 98.1 °F (36.7 °C)  Pulse:  [63-85] 67  Resp:  [6-29] 28  SpO2:  [72 %-100 %] 100 %  BP: ()/(45-71) 79/45     Weight: 57 kg (125 lb 10.6 oz)  Body mass index is 21.57 kg/m².    Physical Exam  Constitutional:       Appearance: Normal appearance. She is normal weight.   HENT:      Head: Normocephalic and atraumatic.      Mouth/Throat:      Mouth: Mucous membranes are dry.      Pharynx: Oropharynx is clear.   Eyes:      Extraocular Movements: Extraocular movements intact.      Conjunctiva/sclera: Conjunctivae normal.      Pupils: Pupils are equal, round, and reactive to light.   Cardiovascular:      Rate and Rhythm: Normal rate and regular rhythm.      Pulses: Normal pulses.      Heart sounds: Normal heart sounds.   Pulmonary:      Effort: Pulmonary effort is normal.      Breath sounds: Normal breath sounds.      Comments: Trach collar in place  Abdominal:      General: Abdomen is flat. Bowel sounds are normal.      Palpations: Abdomen is soft.   Musculoskeletal:         General: Normal range of motion.      Cervical back: Normal range of motion and neck supple.      Comments: Left hip pain unable to move it   Skin:     General: Skin is warm and dry.   Neurological:      General: No focal deficit present.      Mental Status: She is alert and oriented to person, place, and time. Mental status is at baseline.   Psychiatric:         Mood and Affect: Mood normal.         CRANIAL NERVES     CN III, IV, VI   Pupils are equal, round, and reactive to light.      Significant Labs: All pertinent labs within the past 24 hours have been reviewed.  A1C: No results for input(s): HGBA1C in the last 4320 hours.  ABGs:   Recent Labs   Lab 02/28/23  1909 02/28/23  2105   PH 7.275* 7.371   PCO2 99.2* 74.4*   HCO3 46.1* 43.2*   POCSATURATED 99 93*   BE 19 18   PO2 166* 72*     Bilirubin:   Recent Labs   Lab 02/28/23  1732   BILITOT 0.3     Blood Culture: No results for input(s): LABBLOO in the last 48 hours.  BMP:   Recent Labs   Lab 02/28/23  1732      K 4.7   CO2 38*   BUN 12.0   CREATININE 0.66   CALCIUM 9.5     CBC:   Recent Labs   Lab 02/28/23  1732   WBC 8.5   HGB 10.0*   HCT 35.8*        CMP:   Recent Labs   Lab 02/28/23  1732      K 4.7   CO2 38*   BUN 12.0   CREATININE 0.66   CALCIUM 9.5   ALBUMIN 2.8*   BILITOT 0.3   ALKPHOS 71   AST 20   ALT 10     Cardiac Markers:   Recent Labs   Lab 02/28/23  1732   BNP 84.1     Coagulation: No results for input(s): PT, INR, APTT in the last 48 hours.  Lactic Acid: No results for input(s): LACTATE in the last 48 hours.  Lipase: No results for input(s): LIPASE in the last 48 hours.  Lipid Panel: No results for input(s): CHOL, HDL, LDLCALC, TRIG, CHOLHDL in the last 48 hours.  Magnesium: No results for input(s): MG in the last 48 hours.  Pathology Results  (Last 10 years)      None          POCT Glucose: No results for input(s): POCTGLUCOSE in the last 48 hours.  Prealbumin: No results for input(s): PREALBUMIN in the last 48 hours.  Respiratory Culture: No results for input(s): GSRESP, RESPIRATORYC in the last 48 hours.  Troponin:   Recent Labs   Lab 02/28/23  1732   TROPONINI <0.010     TSH: No results for input(s): TSH in the last 4320 hours.  Urine Culture: No results for input(s): LABURIN in the last 48 hours.  Urine Studies: No results for input(s): COLORU, APPEARANCEUA, PHUR, SPECGRAV, PROTEINUA, GLUCUA, KETONESU, BILIRUBINUA, OCCULTUA, NITRITE, UROBILINOGEN, LEUKOCYTESUR, RBCUA, WBCUA, BACTERIA, SQUAMEPITHEL,  HYALINECASTS in the last 48 hours.    Invalid input(s): ANNITA    Significant Imaging:    CXR no acute finding    Assessment/Plan:     No notes have been filed under this hospital service.  Service: Hospital Medicine    VTE Risk Mitigation (From admission, onward)           Ordered     enoxaparin injection 30 mg  Daily         03/01/23 0131     IP VTE HIGH RISK PATIENT  Once         03/01/23 0131     Place sequential compression device  Until discontinued         03/01/23 0131                - Acute on Chronic Hypercapnic resp failure continue on Vent support repeat ABG in AM  - Left hip pain, xray Left hip, Tylenol PRN  -CHF continue lasix, check Labs  -COPD continue Theophyline,  pulmicort, nebs PRN. Check Hermilo level  -PArox Fib Continue Amiodarone, ASA,   - DM hold Oral meds cover SSI  -DVt ppx on Lovenox   CCT 35 mins  Pt is full code, SDM not designated  Pt is seen and examined via telemed. Pt is in [facility]. I am in [place], [place]. Nursing staff assisted with Pt's assessment.   Pt is being admitted to the hospitalist as an inpatient status for further treatment  Kali Hardin MD  Department of Hospital Medicine   [facility] - ICU

## 2023-03-01 NOTE — PLAN OF CARE
Problem: Infection  Goal: Absence of Infection Signs and Symptoms  Outcome: Ongoing, Progressing     Problem: Adult Inpatient Plan of Care  Goal: Plan of Care Review  Outcome: Ongoing, Progressing  Goal: Patient-Specific Goal (Individualized)  Outcome: Ongoing, Progressing  Goal: Absence of Hospital-Acquired Illness or Injury  Outcome: Ongoing, Progressing  Goal: Optimal Comfort and Wellbeing  Outcome: Ongoing, Progressing  Goal: Readiness for Transition of Care  Outcome: Ongoing, Progressing     Problem: Communication Impairment (Mechanical Ventilation, Invasive)  Goal: Effective Communication  Outcome: Ongoing, Progressing     Problem: Device-Related Complication Risk (Mechanical Ventilation, Invasive)  Goal: Optimal Device Function  Outcome: Ongoing, Progressing     Problem: Inability to Wean (Mechanical Ventilation, Invasive)  Goal: Mechanical Ventilation Liberation  Outcome: Ongoing, Progressing     Problem: Nutrition Impairment (Mechanical Ventilation, Invasive)  Goal: Optimal Nutrition Delivery  Outcome: Ongoing, Progressing     Problem: Skin and Tissue Injury (Mechanical Ventilation, Invasive)  Goal: Absence of Device-Related Skin and Tissue Injury  Outcome: Ongoing, Progressing     Problem: Adjustment to Illness (Sepsis/Septic Shock)  Goal: Optimal Coping  Outcome: Ongoing, Progressing     Problem: Bleeding (Sepsis/Septic Shock)  Goal: Absence of Bleeding  Outcome: Ongoing, Progressing     Problem: Impaired Wound Healing  Goal: Optimal Wound Healing  Outcome: Ongoing, Progressing     Problem: Skin Injury Risk Increased  Goal: Skin Health and Integrity  Outcome: Ongoing, Progressing

## 2023-03-01 NOTE — PLAN OF CARE
Problem: Infection  Goal: Absence of Infection Signs and Symptoms  Outcome: Ongoing, Progressing     Problem: Adult Inpatient Plan of Care  Goal: Plan of Care Review  Outcome: Ongoing, Progressing  Goal: Patient-Specific Goal (Individualized)  Outcome: Ongoing, Progressing  Goal: Absence of Hospital-Acquired Illness or Injury  Outcome: Ongoing, Progressing  Goal: Optimal Comfort and Wellbeing  Outcome: Ongoing, Progressing  Goal: Readiness for Transition of Care  Outcome: Ongoing, Progressing     Problem: Communication Impairment (Mechanical Ventilation, Invasive)  Goal: Effective Communication  Outcome: Ongoing, Progressing     Problem: Device-Related Complication Risk (Mechanical Ventilation, Invasive)  Goal: Optimal Device Function  Outcome: Ongoing, Progressing     Problem: Inability to Wean (Mechanical Ventilation, Invasive)  Goal: Mechanical Ventilation Liberation  Outcome: Ongoing, Progressing     Problem: Nutrition Impairment (Mechanical Ventilation, Invasive)  Goal: Optimal Nutrition Delivery  Outcome: Ongoing, Progressing     Problem: Skin and Tissue Injury (Mechanical Ventilation, Invasive)  Goal: Absence of Device-Related Skin and Tissue Injury  Outcome: Ongoing, Progressing     Problem: Ventilator-Induced Lung Injury (Mechanical Ventilation, Invasive)  Goal: Absence of Ventilator-Induced Lung Injury  Outcome: Ongoing, Progressing     Problem: Communication Impairment (Artificial Airway)  Goal: Effective Communication  Outcome: Ongoing, Progressing     Problem: Device-Related Complication Risk (Artificial Airway)  Goal: Optimal Device Function  Outcome: Ongoing, Progressing     Problem: Skin and Tissue Injury (Artificial Airway)  Goal: Absence of Device-Related Skin or Tissue Injury  Outcome: Ongoing, Progressing     Problem: Noninvasive Ventilation Acute  Goal: Effective Unassisted Ventilation and Oxygenation  Outcome: Ongoing, Progressing     Problem: Adjustment to Illness (Sepsis/Septic  Shock)  Goal: Optimal Coping  Outcome: Ongoing, Progressing     Problem: Bleeding (Sepsis/Septic Shock)  Goal: Absence of Bleeding  Outcome: Ongoing, Progressing     Problem: Glycemic Control Impaired (Sepsis/Septic Shock)  Goal: Blood Glucose Level Within Desired Range  Outcome: Ongoing, Progressing     Problem: Infection Progression (Sepsis/Septic Shock)  Goal: Absence of Infection Signs and Symptoms  Outcome: Ongoing, Progressing     Problem: Nutrition Impaired (Sepsis/Septic Shock)  Goal: Optimal Nutrition Intake  Outcome: Ongoing, Progressing     Problem: Impaired Wound Healing  Goal: Optimal Wound Healing  Outcome: Ongoing, Progressing     Problem: Skin Injury Risk Increased  Goal: Skin Health and Integrity  Outcome: Ongoing, Progressing

## 2023-03-02 LAB
POCT GLUCOSE: 114 MG/DL (ref 70–110)
POCT GLUCOSE: 119 MG/DL (ref 70–110)
POCT GLUCOSE: 89 MG/DL (ref 70–110)
POCT GLUCOSE: 93 MG/DL (ref 70–110)

## 2023-03-02 PROCEDURE — 25000003 PHARM REV CODE 250: Performed by: INTERNAL MEDICINE

## 2023-03-02 PROCEDURE — 94003 VENT MGMT INPAT SUBQ DAY: CPT

## 2023-03-02 PROCEDURE — 25000242 PHARM REV CODE 250 ALT 637 W/ HCPCS: Performed by: INTERNAL MEDICINE

## 2023-03-02 PROCEDURE — 99900026 HC AIRWAY MAINTENANCE (STAT)

## 2023-03-02 PROCEDURE — 11000001 HC ACUTE MED/SURG PRIVATE ROOM

## 2023-03-02 PROCEDURE — 94640 AIRWAY INHALATION TREATMENT: CPT

## 2023-03-02 PROCEDURE — 94761 N-INVAS EAR/PLS OXIMETRY MLT: CPT

## 2023-03-02 PROCEDURE — 27000221 HC OXYGEN, UP TO 24 HOURS

## 2023-03-02 PROCEDURE — 63600175 PHARM REV CODE 636 W HCPCS: Performed by: INTERNAL MEDICINE

## 2023-03-02 PROCEDURE — 99900035 HC TECH TIME PER 15 MIN (STAT)

## 2023-03-02 RX ADMIN — BUDESONIDE 0.5 MG: 0.5 INHALANT ORAL at 07:03

## 2023-03-02 RX ADMIN — IPRATROPIUM BROMIDE AND ALBUTEROL SULFATE 3 ML: 2.5; .5 SOLUTION RESPIRATORY (INHALATION) at 03:03

## 2023-03-02 RX ADMIN — ACETAMINOPHEN 650 MG: 325 TABLET, FILM COATED ORAL at 06:03

## 2023-03-02 RX ADMIN — IPRATROPIUM BROMIDE AND ALBUTEROL SULFATE 3 ML: 2.5; .5 SOLUTION RESPIRATORY (INHALATION) at 07:03

## 2023-03-02 RX ADMIN — IPRATROPIUM BROMIDE AND ALBUTEROL SULFATE 3 ML: 2.5; .5 SOLUTION RESPIRATORY (INHALATION) at 11:03

## 2023-03-02 RX ADMIN — ASPIRIN 81 MG: 81 TABLET, COATED ORAL at 09:03

## 2023-03-02 RX ADMIN — MUPIROCIN 1 G: 20 OINTMENT TOPICAL at 09:03

## 2023-03-02 RX ADMIN — ATORVASTATIN CALCIUM 80 MG: 40 TABLET, FILM COATED ORAL at 09:03

## 2023-03-02 RX ADMIN — MUPIROCIN 1 G: 20 OINTMENT TOPICAL at 10:03

## 2023-03-02 RX ADMIN — AMIODARONE HYDROCHLORIDE 200 MG: 200 TABLET ORAL at 10:03

## 2023-03-02 RX ADMIN — Medication 500 MG: at 10:03

## 2023-03-02 RX ADMIN — Medication 500 MG: at 09:03

## 2023-03-02 RX ADMIN — CEFTRIAXONE SODIUM 1 G: 1 INJECTION, POWDER, FOR SOLUTION INTRAMUSCULAR; INTRAVENOUS at 10:03

## 2023-03-02 RX ADMIN — AMIODARONE HYDROCHLORIDE 200 MG: 200 TABLET ORAL at 09:03

## 2023-03-02 RX ADMIN — ENOXAPARIN SODIUM 30 MG: 30 INJECTION SUBCUTANEOUS at 04:03

## 2023-03-02 NOTE — CONSULTS
Subjective:       Patient ID: Michel De La Paz is a 67 y.o. female.    Chief Complaint: Fatigue (Weakness the past few days. Patient on home o2 w/ trach but wasn't wearing it when ems arrived./)    HPI  History obtained from medical record, Dr. Hardin:   68 yo female with hx of Ch resp failure with trach collar, NIDDM, HTN, CHF, Parox Fib, COPD who had prolong hospitalization at [facility] then at [facility], Patient is brought to the emergency room with complaint of generalized weakness for the last few days patient is on home oxygen recent tracheostomy for respiratory failure she had a fall she was found to have elevated Co2 she was placed on Vent with improvement of Resp acidosis. She C/O fall and Left hip pain. No chest pain, No cough, no Fever, no chills    Wound Care Consult:  Patient was admitted to [facility] on 3/1/23 following complaints of weakness and a fall.  She has a history of resp. failure.  She was admitted to [facility] in Jan, 2023 in acute resp failure and spent some time in ICU there.  At that time she presented with a deep tissue injury to the sacrum.  She does report that she lives at home, with family as caregivers.  She does have a trach.  The wound has deteriorated from a deep tissue injury to an unstageable pressure injury.  There is wet, boggy slough throughout the wound bed, depth is unmeasurable at this time.  The wound is malordorous, very moist, draining and painful.  It is recommended that wound cultures be obtained and that surgical debridement be considered when she is stable enough to withstand the procedure.    We will use silver alginate directly to the wound bed, covered with two layers of abd pads for drainage of cushioning.  These are to be changed daily and as needed.  She will be re-consulted if she undergoes debridement.    She also has a small stage II pressure injury at the left posterior thigh.  There is a thin layer of slough over granular tissue.  We will apply Mesalt, mepilex  form and an Island (white) border dressing to this wound, changing daily.       Sacral spine      Left posterior thigh    Left posterior thigh: cleanse with NS, place mesalt in wound bed, cover with a piece of mepilex foam and medipore island dressing, change this daily and as needed for soilage   Sacral spine: cleanse with NS, cover with silver alginate, ABD pad and secure with tape, change this daily and as needed for drainage        Objective:      Vitals:    03/02/23 0927   BP:    Pulse: 70   Resp: 19   Temp:        Physical Exam       Altered Skin Integrity 03/02/23 0925 Left posterior Thigh (Active)   03/02/23 0925   Altered Skin Integrity Present on Admission: yes   Side: Left   Orientation: posterior   Location: Thigh   Wound Number:    Is this injury device related?:    Primary Wound Type:    Description of Altered Skin Integrity:    Ankle-Brachial Index:    Pulses:    Removal Indication and Assessment:    Wound Outcome:    (Retired) Wound Length (cm):    (Retired) Wound Width (cm):    (Retired) Depth (cm):    Wound Description (Comments):    Removal Indications:    Dressing Appearance Moist drainage 03/02/23 0925   Drainage Amount Small 03/02/23 0925   Drainage Characteristics/Odor Serosanguineous 03/02/23 0925   Appearance Pink;White;Slough;Moist 03/02/23 0925   Tissue loss description Full thickness 03/02/23 0925   Periwound Area [place];Moist 03/02/23 0925   Wound Edges Defined 03/02/23 0925   Wound Length (cm) 2.4 cm 03/02/23 0925   Wound Width (cm) 2.2 cm 03/02/23 0925   Wound Depth (cm) 0.2 cm 03/02/23 0925   Wound Volume (cm^3) 1.056 cm^3 03/02/23 0925   Wound Surface Area (cm^2) 5.28 cm^2 03/02/23 0925            Altered Skin Integrity 03/02/23 0933 Sacral spine Full thickness tissue loss. Base is covered by slough and/or eschar in the wound bed (Active)   03/02/23 0933   Altered Skin Integrity Present on Admission: yes   Side:    Orientation:    Location: Sacral spine   Wound Number:    Is this  injury device related?:    Primary Wound Type:    Description of Altered Skin Integrity: Full thickness tissue loss. Base is covered by slough and/or eschar in the wound bed   Ankle-Brachial Index:    Pulses:    Removal Indication and Assessment:    Wound Outcome:    (Retired) Wound Length (cm):    (Retired) Wound Width (cm):    (Retired) Depth (cm):    Wound Description (Comments):    Removal Indications:    Dressing Appearance Moist drainage 03/02/23 0925   Drainage Amount Large 03/02/23 0925   Drainage Characteristics/Odor Brown;Malodorous;[place] 03/02/23 0925   Appearance Yellow;Tan;Wet;Slough 03/02/23 0925   Tissue loss description Full thickness 03/02/23 0925   Periwound Area Moist;[place] 03/02/23 0925   Wound Edges Defined;Open 03/02/23 0925   Wound Length (cm) 4.9 cm 03/02/23 0925   Wound Width (cm) 3.6 cm 03/02/23 0925   Wound Surface Area (cm^2) 17.64 cm^2 03/02/23 0925         Assessment:         ICD-10-CM ICD-9-CM   1. Acute on chronic respiratory failure with hypercapnia  J96.22 518.84   2. Weakness  R53.1 780.79   3. CO2 narcosis  R06.89 786.09   4. Severe sepsis with septic shock  A41.9 038.9    R65.21 995.92     785.52         MEDICATIONS    Current Facility-Administered Medications:     acetaminophen tablet 650 mg, 650 mg, Oral, Q6H PRN, Kali Hardin MD, 650 mg at 03/01/23 2147    albuterol-ipratropium 2.5 mg-0.5 mg/3 mL nebulizer solution 3 mL, 3 mL, Nebulization, Q4H PRN, Kali Hardin MD, 3 mL at 03/02/23 0742    amiodarone tablet 200 mg, 200 mg, Oral, BID, Donavon Davenport MD, 200 mg at 03/02/23 0939    ascorbic acid (vitamin C) tablet 500 mg, 500 mg, Oral, BID, Kali Hardin MD, 500 mg at 03/02/23 0939    aspirin EC tablet 81 mg, 81 mg, Oral, Daily, Kali Hardin MD, 81 mg at 03/02/23 0938    atorvastatin tablet 80 mg, 80 mg, Oral, Daily, Kali Hardin MD, 80 mg at 03/02/23 0935    budesonide nebulizer solution 0.5 mg, 0.5 mg, Nebulization, BID, Donavon Davenport MD, 0.5 mg at 03/02/23 0738     cefTRIAXone injection 1 g, 1 g, Intravenous, Q24H, Nalelly Girard MD, 1 g at 03/01/23 2147    dextrose 50% injection 12.5 g, 12.5 g, Intravenous, PRN, Kali Hardin MD    dextrose 50% injection 25 g, 25 g, Intravenous, PRN, Kali Hardin MD    enoxaparin injection 30 mg, 30 mg, Subcutaneous, Daily, Nallely Girard MD, 30 mg at 03/01/23 1655    furosemide tablet 20 mg, 20 mg, Oral, Daily, Kali Hardin MD    glucagon (human recombinant) injection 1 mg, 1 mg, Intramuscular, PRN, Kali Hardin MD    glucose chewable tablet 16 g, 16 g, Oral, PRN, Kali Hardin MD    glucose chewable tablet 24 g, 24 g, Oral, PRN, Kali Hardin MD    insulin aspart U-100 injection 0-5 Units, 0-5 Units, Subcutaneous, QID (AC + HS) PRN, Kali Hardin MD    mupirocin 2 % ointment, , Nasal, BID, Kentrellreyna Massey MD, 1 g at 03/01/23 2147    theophylline Tb24 400 mg, 400 mg, Oral, Daily, Kali Hardin MD   Review of patient's allergies indicates:   Allergen Reactions    Benadryl [diphenhydramine hcl]     Darvocet a500 [propoxyphene n-acetaminophen]              Labs/Scans/Micro:    CBC:   Lab Results   Component Value Date    WBC 6.4 03/01/2023    HGB 7.8 (L) 03/01/2023    HCT 28.0 (L) 03/01/2023    MCV 87.0 03/01/2023     03/01/2023       Electronically signed:  Kaylah Canada NP

## 2023-03-02 NOTE — PLAN OF CARE
Called [facility] and they stated that they had not rec'd the referral.  Sent referral again.  They do take [employer].     Forms rec'd via fax, placed on red chart with hi-lighted area for Dr. Davenport to fill out and sign and messaged him to please do this.

## 2023-03-02 NOTE — PROGRESS NOTES
Inpatient Nutrition Evaluation    Admit Date: 2/28/2023   Total duration of encounter: 2 days    Nutrition Recommendation/Prescription     Rec'd continue Diabetic Diet as tolerated.   Monitor intake, weight, and labs.     RD following and available as needed.  Thank you.     Nutrition Assessment     Chart Review    Reason Seen: continuous nutrition monitoring    Malnutrition Screening Tool Results   Have you recently lost weight without trying?: No  Have you been eating poorly because of a decreased appetite?: No   MST Score: 0     Diagnosis:  Weakness.  Acute on chronic respiratory failure with hypercapnia (Primary).  CO2 narcosis.       Relevant Medical History:   CHF (congestive heart failure)      Chronic low back pain      COPD (chronic obstructive pulmonary disease)      Diabetes mellitus, type 2      Diverticular disease of large intestine without perforation or abscess      DM (diabetes mellitus)      GERD (gastroesophageal reflux disease)      Hypertension      Mild intermittent asthma, uncomplicated      Neuropathy      Respiratory failure      Sleep apnea          Nutrition-Related Medications:   Ascorbic Acid; Lovenox; Furosemide;     Nutrition-Related Labs:  3/2: No new labs. Labs on 3/1: Cl 96(L); CO2 38(H); (H); H/H 7.8/28(L).    Diet Order: Diet diabetic  Oral Supplement Order: none  Appetite/Oral Intake: good/% of meals  Factors Affecting Nutritional Intake: none identified  Food/Christianity/Cultural Preferences: none reported  Food Allergies: none reported    Skin Integrity: wound  Wound(s):      Altered Skin Integrity 03/02/23 0925 Left posterior Thigh-Tissue loss description: Full thickness       Altered Skin Integrity 03/02/23 0933 Sacral spine Full thickness tissue loss. Base is covered by slough and/or eschar in the wound bed-Tissue loss description: Full thickness     Comments    3/2: Per H&P: Pt presented with weakness the pst few days. Pt was on home O2 w/ trach but wasn't  "wearing it when EMS arrived. No recent weight loss noted/reported. Vent on standby. Sat's currently 98. On Diabetic Diet with good intake. Consumed 100% per EMR recorded intake. Nsg reports pt will likely be transferred to the floor today. Will continue to monitor during stay.      Anthropometrics    Height: 5' 4" (162.6 cm) Height Method: Measured  Last Weight: 57 kg (125 lb 10.6 oz) (03/01/23 0045) Weight Method: Bed Scale  BMI (Calculated): 21.6  BMI Classification: underweight (BMI less than 22 if >65 years of age)     Ideal Body Weight (IBW), Female: 120 lb     % Ideal Body Weight, Female (lb): 104.72 %                             Usual Weight Provided By: EMR weight history    Wt Readings from Last 3 Encounters:   03/01/23 0045 57 kg (125 lb 10.6 oz)   02/28/23 1708 59.9 kg (132 lb)   02/02/23 0020 60 kg (132 lb 4.8 oz)   01/31/23 2200 59 kg (130 lb 1.6 oz)   01/31/23 0230 59 kg (130 lb 1.6 oz)   01/29/23 2230 56.4 kg (124 lb 4.8 oz)   01/28/23 2200 57.7 kg (127 lb 3.2 oz)   01/27/23 1900 55.1 kg (121 lb 8 oz)   01/26/23 0525 58.5 kg (129 lb)   01/25/23 2100 58.5 kg (129 lb)   01/24/23 2200 55.8 kg (123 lb)   01/24/23 0030 51.7 kg (114 lb)   01/23/23 0100 51.1 kg (112 lb 11.2 oz)   01/21/23 2030 49.6 kg (109 lb 4.9 oz)   01/21/23 0832 52.7 kg (116 lb 2.9 oz)   10/22/21 0913 60.1 kg (132 lb 7.9 oz)   09/22/21 0834 58.4 kg (128 lb 12 oz)   09/13/21 0809 61 kg (134 lb 7.7 oz)   08/03/21 0847 60 kg (132 lb 4.4 oz)   02/02/21 1317 62.1 kg (136 lb 14.5 oz)   06/03/20 0943 68.8 kg (151 lb 10.8 oz)      Weight Change(s) Since Admission:  Admit Weight: 59.9 kg (132 lb) (02/28/23 1708)      Patient Education    Not applicable.    Monitoring & Evaluation     Dietitian will monitor food and beverage intake, weight, electrolyte/renal panel, glucose/endocrine profile, and gastrointestinal profile.  Nutrition Risk/Follow-Up: low (follow-up in 5-7 days)  Patients assigned 'low nutrition risk' status do not qualify for a full " nutritional assessment but will be monitored and re-evaluated in a 5-7 day time period. Please consult if re-evaluation needed sooner.

## 2023-03-03 LAB
BACTERIA SPT CULT: NO GROWTH
ERYTHROCYTE [DISTWIDTH] IN BLOOD BY AUTOMATED COUNT: 22.9 % (ref 11.5–17)
GRAM STN SPEC: NORMAL
GRAM STN SPEC: NORMAL
HCT VFR BLD AUTO: 30.3 % (ref 37–47)
HGB BLD-MCNC: 8.9 G/DL (ref 12–16)
MCH RBC QN AUTO: 24.9 PG
MCHC RBC AUTO-ENTMCNC: 29.4 G/DL (ref 33–36)
MCV RBC AUTO: 84.6 FL (ref 80–94)
PLATELET # BLD AUTO: 219 X10(3)/MCL (ref 130–400)
PMV BLD AUTO: 9.9 FL (ref 7.4–10.4)
POCT GLUCOSE: 112 MG/DL (ref 70–110)
POCT GLUCOSE: 118 MG/DL (ref 70–110)
POCT GLUCOSE: 120 MG/DL (ref 70–110)
POCT GLUCOSE: 135 MG/DL (ref 70–110)
RBC # BLD AUTO: 3.58 X10(6)/MCL (ref 4.2–5.4)
WBC # SPEC AUTO: 9 X10(3)/MCL (ref 4.5–11.5)

## 2023-03-03 PROCEDURE — 85027 COMPLETE CBC AUTOMATED: CPT | Performed by: INTERNAL MEDICINE

## 2023-03-03 PROCEDURE — 94640 AIRWAY INHALATION TREATMENT: CPT

## 2023-03-03 PROCEDURE — 11000001 HC ACUTE MED/SURG PRIVATE ROOM

## 2023-03-03 PROCEDURE — 25000242 PHARM REV CODE 250 ALT 637 W/ HCPCS: Performed by: INTERNAL MEDICINE

## 2023-03-03 PROCEDURE — 27000221 HC OXYGEN, UP TO 24 HOURS

## 2023-03-03 PROCEDURE — 99900026 HC AIRWAY MAINTENANCE (STAT)

## 2023-03-03 PROCEDURE — 25000003 PHARM REV CODE 250: Performed by: INTERNAL MEDICINE

## 2023-03-03 PROCEDURE — 94761 N-INVAS EAR/PLS OXIMETRY MLT: CPT

## 2023-03-03 PROCEDURE — 63600175 PHARM REV CODE 636 W HCPCS: Performed by: INTERNAL MEDICINE

## 2023-03-03 RX ORDER — LEVOFLOXACIN 500 MG/1
500 TABLET, FILM COATED ORAL DAILY
Status: COMPLETED | OUTPATIENT
Start: 2023-03-04 | End: 2023-03-08

## 2023-03-03 RX ADMIN — IPRATROPIUM BROMIDE AND ALBUTEROL SULFATE 3 ML: 2.5; .5 SOLUTION RESPIRATORY (INHALATION) at 07:03

## 2023-03-03 RX ADMIN — IPRATROPIUM BROMIDE AND ALBUTEROL SULFATE 3 ML: 2.5; .5 SOLUTION RESPIRATORY (INHALATION) at 11:03

## 2023-03-03 RX ADMIN — AMIODARONE HYDROCHLORIDE 200 MG: 200 TABLET ORAL at 10:03

## 2023-03-03 RX ADMIN — IPRATROPIUM BROMIDE AND ALBUTEROL SULFATE 3 ML: 2.5; .5 SOLUTION RESPIRATORY (INHALATION) at 03:03

## 2023-03-03 RX ADMIN — ASPIRIN 81 MG: 81 TABLET, COATED ORAL at 09:03

## 2023-03-03 RX ADMIN — MUPIROCIN: 20 OINTMENT TOPICAL at 09:03

## 2023-03-03 RX ADMIN — BUDESONIDE 0.5 MG: 0.5 INHALANT ORAL at 07:03

## 2023-03-03 RX ADMIN — AMIODARONE HYDROCHLORIDE 200 MG: 200 TABLET ORAL at 09:03

## 2023-03-03 RX ADMIN — ATORVASTATIN CALCIUM 80 MG: 40 TABLET, FILM COATED ORAL at 09:03

## 2023-03-03 RX ADMIN — Medication 500 MG: at 10:03

## 2023-03-03 RX ADMIN — ACETAMINOPHEN 650 MG: 325 TABLET, FILM COATED ORAL at 10:03

## 2023-03-03 RX ADMIN — Medication 500 MG: at 09:03

## 2023-03-03 RX ADMIN — ENOXAPARIN SODIUM 30 MG: 30 INJECTION SUBCUTANEOUS at 05:03

## 2023-03-03 NOTE — DISCHARGE INSTRUCTIONS
[facility] will deliver a  Trilogy for home.  Phone is [phone]    [facility] has delivered a hospital bed for you.  Phone is [phone].  [facility] will see you at home.  Phone is [phone].

## 2023-03-03 NOTE — PROGRESS NOTES
Hospital Medicine  Progress Note    Patient Name: Michel De La Paz  MRN: [id number]  Status: IP- Inpatient   Admission Date: 2/28/2023  Length of Stay: 3      CC: hospital follow-up for respiratory failure       SUBJECTIVE   67 year old with severe COPD and chronic respiratory failure, s/p prior tracheostomy recently presented back to ED, where she was found to be in acute respiratory acidosis, requiring placement back on vent.  Hypercapnia improved, weaned to trach collar. CM working on Trilogy device as recommended by Pulm.    Today: No new complaints or issues.   On 3L trach collar and doing well.       MEDICATIONS   Scheduled   amiodarone  200 mg Oral BID    ascorbic acid (vitamin C)  500 mg Oral BID    aspirin  81 mg Oral Daily    atorvastatin  80 mg Oral Daily    budesonide  0.5 mg Nebulization BID    cefTRIAXone  1 g Intravenous Q24H    enoxaparin  30 mg Subcutaneous Daily    furosemide  20 mg Oral Daily    mupirocin   Nasal BID    theophylline  400 mg Oral Daily     Continuous Infusions  None      PHYSICAL EXAM   VITALS: T 97.6 °F (36.4 °C)   /64   P 76   RR 20   O2 (!) 92 %    GENERAL: Awake and in NAD  LUNGS: CTA B/L, though decreased air movement throughout  CVS: Normal rate  GI/: Soft, NT, bowel sounds positive.  EXTREMITIES: No peripheral edema  NEURO: AAOx3  PSYCH: Cooperative      LABS   CBC  Recent Labs     03/01/23  0357 03/03/23  1544   WBC 6.4 9.0   RBC 3.22* 3.58*   HGB 7.8* 8.9*   HCT 28.0* 30.3*   MCV 87.0 84.6   MCH 24.2 24.9   MCHC 27.9* 29.4*   RDW 23.7* 22.9*    219     CHEM  Recent Labs     02/28/23  1732 03/01/23  0357    140   K 4.7 4.3   CHLORIDE 97* 96*   CO2 38* 38*   BUN 12.0 12.0   CREATININE 0.66 0.65   GLUCOSE 134* 124*   CALCIUM 9.5 8.9   ALBUMIN 2.8*  --    GLOBULIN 4.1*  --    ALKPHOS 71  --    ALT 10  --    AST 20  --    BILITOT 0.3  --          MICROBIOLOGY     Microbiology Results (last 7 days)       Procedure Component Value Units Date/Time    Respiratory  Culture [[id number]] Collected: 03/01/23 0942    Order Status: Completed Specimen: Sputum from Endotracheal Aspirate Updated: 03/03/23 0830     Respiratory Culture No Growth     GRAM STAIN Quality 3+      No bacteria seen            ASSESSMENT   Acute on chronic hypercapnic and hypoxic respiratory failure  Severe COPD  Cardiomyopathy, compensated   NIDDM II    PLAN   Culture negative can transition to oral antibiotics for another couple of days  Otherwise continue current management  Wean supplemental oxygen as tolerated to maintain saturations 88-92%  CM working on Trilogy device      Prophylaxis: SC Lovenox        Manpreet Murguia MD  [facility]

## 2023-03-03 NOTE — PLAN OF CARE
03/03/23 1303   Discharge Reassessment   Assessment Type Discharge Planning Reassessment   Discharge Plan discussed with: Caregiver   Discharge Plan A Home Health   Discharge Plan B Home Health   DME Needed Upon Discharge  other (see comments)   Discharge Barriers Identified None   Why the patient remains in the hospital Requires continued medical care   Post-Acute Status   Post-Acute Authorization HME   HME Status Pending payor review   Hospital Resources/Appts/Education Provided Post-Acute resouces added to AVS   Discharge Delays (!) Home Medical Equipment (Insurance, Delivery)     Trilogy pending insurance auth w/[employer].

## 2023-03-03 NOTE — PLAN OF CARE
Order form not signed yesterday for Trilogy.  Dr. Felix signed this AM and I faxed to [facility], confirmed with them they rec'd.

## 2023-03-04 LAB
POCT GLUCOSE: 103 MG/DL (ref 70–110)
POCT GLUCOSE: 119 MG/DL (ref 70–110)
POCT GLUCOSE: 124 MG/DL (ref 70–110)
POCT GLUCOSE: 96 MG/DL (ref 70–110)

## 2023-03-04 PROCEDURE — 27000221 HC OXYGEN, UP TO 24 HOURS

## 2023-03-04 PROCEDURE — 94761 N-INVAS EAR/PLS OXIMETRY MLT: CPT

## 2023-03-04 PROCEDURE — 25000003 PHARM REV CODE 250: Performed by: INTERNAL MEDICINE

## 2023-03-04 PROCEDURE — 11000001 HC ACUTE MED/SURG PRIVATE ROOM

## 2023-03-04 PROCEDURE — 99900035 HC TECH TIME PER 15 MIN (STAT)

## 2023-03-04 PROCEDURE — 63600175 PHARM REV CODE 636 W HCPCS: Performed by: INTERNAL MEDICINE

## 2023-03-04 PROCEDURE — 25000242 PHARM REV CODE 250 ALT 637 W/ HCPCS: Performed by: INTERNAL MEDICINE

## 2023-03-04 PROCEDURE — 99900026 HC AIRWAY MAINTENANCE (STAT)

## 2023-03-04 PROCEDURE — 94640 AIRWAY INHALATION TREATMENT: CPT

## 2023-03-04 PROCEDURE — 27100171 HC OXYGEN HIGH FLOW UP TO 24 HOURS

## 2023-03-04 RX ADMIN — BUDESONIDE 0.5 MG: 0.5 INHALANT ORAL at 07:03

## 2023-03-04 RX ADMIN — ASPIRIN 81 MG: 81 TABLET, COATED ORAL at 09:03

## 2023-03-04 RX ADMIN — IPRATROPIUM BROMIDE AND ALBUTEROL SULFATE 3 ML: 2.5; .5 SOLUTION RESPIRATORY (INHALATION) at 07:03

## 2023-03-04 RX ADMIN — IPRATROPIUM BROMIDE AND ALBUTEROL SULFATE 3 ML: 2.5; .5 SOLUTION RESPIRATORY (INHALATION) at 11:03

## 2023-03-04 RX ADMIN — IPRATROPIUM BROMIDE AND ALBUTEROL SULFATE 3 ML: 2.5; .5 SOLUTION RESPIRATORY (INHALATION) at 03:03

## 2023-03-04 RX ADMIN — FUROSEMIDE 20 MG: 20 TABLET ORAL at 09:03

## 2023-03-04 RX ADMIN — ACETAMINOPHEN 650 MG: 325 TABLET, FILM COATED ORAL at 01:03

## 2023-03-04 RX ADMIN — LEVOFLOXACIN 500 MG: 500 TABLET, FILM COATED ORAL at 09:03

## 2023-03-04 RX ADMIN — AMIODARONE HYDROCHLORIDE 200 MG: 200 TABLET ORAL at 08:03

## 2023-03-04 RX ADMIN — IPRATROPIUM BROMIDE AND ALBUTEROL SULFATE 3 ML: 2.5; .5 SOLUTION RESPIRATORY (INHALATION) at 08:03

## 2023-03-04 RX ADMIN — ENOXAPARIN SODIUM 30 MG: 30 INJECTION SUBCUTANEOUS at 06:03

## 2023-03-04 RX ADMIN — Medication 500 MG: at 09:03

## 2023-03-04 RX ADMIN — MUPIROCIN: 20 OINTMENT TOPICAL at 09:03

## 2023-03-04 RX ADMIN — AMIODARONE HYDROCHLORIDE 200 MG: 200 TABLET ORAL at 09:03

## 2023-03-04 RX ADMIN — Medication 500 MG: at 08:03

## 2023-03-04 RX ADMIN — ATORVASTATIN CALCIUM 80 MG: 40 TABLET, FILM COATED ORAL at 09:03

## 2023-03-04 RX ADMIN — BUDESONIDE 0.5 MG: 0.5 INHALANT ORAL at 08:03

## 2023-03-04 NOTE — PROGRESS NOTES
Hospital Medicine  Progress Note    Patient Name: Michel De La Paz  MRN: [id number]  Status: IP- Inpatient   Admission Date: 2/28/2023  Length of Stay: 4      CC: hospital follow-up for respiratory failure       SUBJECTIVE   67 year old with severe COPD and chronic respiratory failure, s/p prior tracheostomy recently presented back to ED, where she was found to be in acute respiratory acidosis, requiring placement back on vent.  Hypercapnia improved, weaned to trach collar. CM working on Trilogy device as recommended by Pulm.    Today: Trach collar titrated up to 5L overnight, but currently oxygenating at 98%.  Otherwise no new complaints or issues.        MEDICATIONS   Scheduled   amiodarone  200 mg Oral BID    ascorbic acid (vitamin C)  500 mg Oral BID    aspirin  81 mg Oral Daily    atorvastatin  80 mg Oral Daily    budesonide  0.5 mg Nebulization BID    enoxaparin  30 mg Subcutaneous Daily    furosemide  20 mg Oral Daily    levoFLOXacin  500 mg Oral Daily    mupirocin   Nasal BID    theophylline  400 mg Oral Daily     Continuous Infusions  None      PHYSICAL EXAM   VITALS: T 97.8 °F (36.6 °C)   /66   P 76   RR 20   O2 95 %    GENERAL: Awake and in NAD  LUNGS: CTA B/L, though decreased air movement throughout  CVS: Normal rate  GI/: Soft, NT, bowel sounds positive.  EXTREMITIES: No peripheral edema  NEURO: AAOx3  PSYCH: Cooperative      LABS   CBC  Recent Labs     03/03/23  1544   WBC 9.0   RBC 3.58*   HGB 8.9*   HCT 30.3*   MCV 84.6   MCH 24.9   MCHC 29.4*   RDW 22.9*          CHEM  No results for input(s): NA, K, CHLORIDE, CO2, BUN, CREATININE, GLUCOSE, CALCIUM, MG, PHOS, ALBUMIN, GLOBULIN, ALKPHOS, ALT, AST, BILITOT, LIPASE, CRP, LDH, HAPTOGLOBIN, FERRITIN, IRON, TIBC, TSH, FREET4 in the last 72 hours.        MICROBIOLOGY     Microbiology Results (last 7 days)       Procedure Component Value Units Date/Time    Respiratory Culture [[id number]] Collected: 03/01/23 0942    Order Status: Completed  Specimen: Sputum from Endotracheal Aspirate Updated: 03/03/23 0830     Respiratory Culture No Growth     GRAM STAIN Quality 3+      No bacteria seen            ASSESSMENT   Acute on chronic hypercapnic and hypoxic respiratory failure  Severe COPD  Cardiomyopathy, compensated   NIDDM II    PLAN   Continue with oral Levaquin for another day or so  Will repeat CXR today but can wean supplemental oxygen, saturations 88-92% are optimal for this patient  CM working on Trilogy device  Otherwise continue current management      Prophylaxis: SC Lovenox        Manpreet Murguia MD  [facility] Medicine

## 2023-03-05 PROBLEM — J44.1 COPD EXACERBATION: Status: ACTIVE | Noted: 2023-03-05

## 2023-03-05 LAB
POCT GLUCOSE: 111 MG/DL (ref 70–110)
POCT GLUCOSE: 88 MG/DL (ref 70–110)
POCT GLUCOSE: 99 MG/DL (ref 70–110)

## 2023-03-05 PROCEDURE — 25000242 PHARM REV CODE 250 ALT 637 W/ HCPCS: Performed by: INTERNAL MEDICINE

## 2023-03-05 PROCEDURE — 99900026 HC AIRWAY MAINTENANCE (STAT)

## 2023-03-05 PROCEDURE — 25000003 PHARM REV CODE 250: Performed by: INTERNAL MEDICINE

## 2023-03-05 PROCEDURE — 99900035 HC TECH TIME PER 15 MIN (STAT)

## 2023-03-05 PROCEDURE — 27000221 HC OXYGEN, UP TO 24 HOURS

## 2023-03-05 PROCEDURE — 94640 AIRWAY INHALATION TREATMENT: CPT

## 2023-03-05 PROCEDURE — 11000001 HC ACUTE MED/SURG PRIVATE ROOM

## 2023-03-05 PROCEDURE — 94761 N-INVAS EAR/PLS OXIMETRY MLT: CPT

## 2023-03-05 PROCEDURE — 63600175 PHARM REV CODE 636 W HCPCS: Performed by: INTERNAL MEDICINE

## 2023-03-05 RX ORDER — TRAMADOL HYDROCHLORIDE 50 MG/1
50 TABLET ORAL EVERY 6 HOURS PRN
Status: DISCONTINUED | OUTPATIENT
Start: 2023-03-05 | End: 2023-03-08 | Stop reason: HOSPADM

## 2023-03-05 RX ADMIN — FUROSEMIDE 20 MG: 20 TABLET ORAL at 09:03

## 2023-03-05 RX ADMIN — Medication 500 MG: at 09:03

## 2023-03-05 RX ADMIN — BUDESONIDE 0.5 MG: 0.5 INHALANT ORAL at 07:03

## 2023-03-05 RX ADMIN — IPRATROPIUM BROMIDE AND ALBUTEROL SULFATE 3 ML: 2.5; .5 SOLUTION RESPIRATORY (INHALATION) at 03:03

## 2023-03-05 RX ADMIN — MUPIROCIN 1 G: 20 OINTMENT TOPICAL at 09:03

## 2023-03-05 RX ADMIN — TRAMADOL HYDROCHLORIDE 50 MG: 50 TABLET, COATED ORAL at 04:03

## 2023-03-05 RX ADMIN — MUPIROCIN: 20 OINTMENT TOPICAL at 09:03

## 2023-03-05 RX ADMIN — ACETAMINOPHEN 650 MG: 325 TABLET, FILM COATED ORAL at 09:03

## 2023-03-05 RX ADMIN — IPRATROPIUM BROMIDE AND ALBUTEROL SULFATE 3 ML: 2.5; .5 SOLUTION RESPIRATORY (INHALATION) at 11:03

## 2023-03-05 RX ADMIN — LEVOFLOXACIN 500 MG: 500 TABLET, FILM COATED ORAL at 09:03

## 2023-03-05 RX ADMIN — ASPIRIN 81 MG: 81 TABLET, COATED ORAL at 09:03

## 2023-03-05 RX ADMIN — ENOXAPARIN SODIUM 30 MG: 30 INJECTION SUBCUTANEOUS at 04:03

## 2023-03-05 RX ADMIN — AMIODARONE HYDROCHLORIDE 200 MG: 200 TABLET ORAL at 09:03

## 2023-03-05 RX ADMIN — IPRATROPIUM BROMIDE AND ALBUTEROL SULFATE 3 ML: 2.5; .5 SOLUTION RESPIRATORY (INHALATION) at 07:03

## 2023-03-05 RX ADMIN — ATORVASTATIN CALCIUM 80 MG: 40 TABLET, FILM COATED ORAL at 09:03

## 2023-03-05 NOTE — SUBJECTIVE & OBJECTIVE
Interval History:     Review of Systems   Constitutional:  Positive for activity change and fatigue.   HENT: Negative.     Eyes: Negative.    Respiratory:  Positive for cough and shortness of breath.    Cardiovascular: Negative.    Gastrointestinal: Negative.    Endocrine: Negative.    Genitourinary: Negative.    Musculoskeletal: Negative.    Skin: Negative.    Allergic/Immunologic: Negative.    Neurological:  Positive for weakness.   Hematological: Negative.    Psychiatric/Behavioral: Negative.     Objective:     Vital Signs (Most Recent):  Temp: 98.6 °F (37 °C) (03/05/23 0820)  Pulse: 70 (03/05/23 1106)  Resp: 20 (03/05/23 1106)  BP: 109/64 (03/05/23 0820)  SpO2: (!) 94 % (03/05/23 1106)   Vital Signs (24h Range):  Temp:  [98 °F (36.7 °C)-98.6 °F (37 °C)] 98.6 °F (37 °C)  Pulse:  [66-84] 70  Resp:  [18-20] 20  SpO2:  [79 %-97 %] 94 %  BP: (109-129)/(52-69) 109/64     Weight: 57 kg (125 lb 10.6 oz)  Body mass index is 21.57 kg/m².    Intake/Output Summary (Last 24 hours) at 3/5/2023 1144  Last data filed at 3/5/2023 1019  Gross per 24 hour   Intake 711 ml   Output 1900 ml   Net -1189 ml      Physical Exam  Constitutional:       Appearance: Normal appearance. She is normal weight.   HENT:      Head: Normocephalic and atraumatic.      Nose: Nose normal.      Mouth/Throat:      Mouth: Mucous membranes are moist.      Pharynx: Oropharynx is clear.   Eyes:      Extraocular Movements: Extraocular movements intact.      Conjunctiva/sclera: Conjunctivae normal.      Pupils: Pupils are equal, round, and reactive to light.   Cardiovascular:      Rate and Rhythm: Normal rate and regular rhythm.      Pulses: Normal pulses.      Heart sounds: Normal heart sounds.   Pulmonary:      Effort: Pulmonary effort is normal.      Breath sounds: Wheezing present.      Comments: trach  Abdominal:      General: Bowel sounds are normal.      Palpations: Abdomen is soft.   Musculoskeletal:         General: Normal range of motion.       Cervical back: Normal range of motion and neck supple.   Skin:     General: Skin is warm and dry.      Capillary Refill: Capillary refill takes 2 to 3 seconds.   Neurological:      General: No focal deficit present.      Mental Status: She is alert and oriented to person, place, and time. Mental status is at baseline.   Psychiatric:         Mood and Affect: Mood normal.         Behavior: Behavior normal.         Thought Content: Thought content normal.         Judgment: Judgment normal.       Significant Labs: All pertinent labs within the past 24 hours have been reviewed.  BMP: No results for input(s): GLU, NA, K, CL, CO2, BUN, CREATININE, CALCIUM, MG in the last 48 hours.  CBC:   Recent Labs   Lab 03/03/23  1544   WBC 9.0   HGB 8.9*   HCT 30.3*        CMP: No results for input(s): NA, K, CL, CO2, GLU, BUN, CREATININE, CALCIUM, PROT, ALBUMIN, BILITOT, ALKPHOS, AST, ALT, ANIONGAP, EGFRNONAA in the last 48 hours.    Invalid input(s): ESTGFAFR[place]  Magnesium: No results for input(s): MG in the last 48 hours.    Significant Imaging: I have reviewed all pertinent imaging results/findings within the past 24 hours.

## 2023-03-05 NOTE — PROGRESS NOTES
[facility] - Medical Surgical Unit  [facility] Medicine  Progress Note    Patient Name: Michel De La Paz  MRN: [id number]  Patient Class: IP- Inpatient   Admission Date: 2/28/2023  Length of Stay: 5 days  Attending Physician: Donavon Davenport MD  Primary Care Provider: WESLY Best        Subjective:     Principal Problem:Acute respiratory failure with hypoxia and hypercarbia        HPI:   66 yo female with hx of Ch resp failure with trach collar, NIDDM, HTN, CHF, Parox Fib, COPD who had prolong hospitalization at [place] then at [facility], Patient is brought to the emergency room with complaint of generalized weakness for the last few days patient is on home oxygen recent tracheostomy for respiratory failure she had a fall she was found to have elevated Co2 she was placed on Vent with improvement of Resp acidosis. She C/O fall and Left hip pain. No chest pain, No cough, no Fever, no chills      Overview/Hospital Course:  3/25/23-Will continue to wean O2 for lowest possible setting.  CM is working on a Trilogy machine for discharge.  Overall the patient seems to be doing better.      Interval History:     Review of Systems   Constitutional:  Positive for activity change and fatigue.   HENT: Negative.     Eyes: Negative.    Respiratory:  Positive for cough and shortness of breath.    Cardiovascular: Negative.    Gastrointestinal: Negative.    Endocrine: Negative.    Genitourinary: Negative.    Musculoskeletal: Negative.    Skin: Negative.    Allergic/Immunologic: Negative.    Neurological:  Positive for weakness.   Hematological: Negative.    Psychiatric/Behavioral: Negative.     Objective:     Vital Signs (Most Recent):  Temp: 98.6 °F (37 °C) (03/05/23 0820)  Pulse: 70 (03/05/23 1106)  Resp: 20 (03/05/23 1106)  BP: 109/64 (03/05/23 0820)  SpO2: (!) 94 % (03/05/23 1106)   Vital Signs (24h Range):  Temp:  [98 °F (36.7 °C)-98.6 °F (37 °C)] 98.6 °F (37 °C)  Pulse:  [66-84] 70  Resp:  [18-20]  20  SpO2:  [79 %-97 %] 94 %  BP: (109-129)/(52-69) 109/64     Weight: 57 kg (125 lb 10.6 oz)  Body mass index is 21.57 kg/m².    Intake/Output Summary (Last 24 hours) at 3/5/2023 1144  Last data filed at 3/5/2023 1019  Gross per 24 hour   Intake 711 ml   Output 1900 ml   Net -1189 ml      Physical Exam  Constitutional:       Appearance: Normal appearance. She is normal weight.   HENT:      Head: Normocephalic and atraumatic.      Nose: Nose normal.      Mouth/Throat:      Mouth: Mucous membranes are moist.      Pharynx: Oropharynx is clear.   Eyes:      Extraocular Movements: Extraocular movements intact.      Conjunctiva/sclera: Conjunctivae normal.      Pupils: Pupils are equal, round, and reactive to light.   Cardiovascular:      Rate and Rhythm: Normal rate and regular rhythm.      Pulses: Normal pulses.      Heart sounds: Normal heart sounds.   Pulmonary:      Effort: Pulmonary effort is normal.      Breath sounds: Wheezing present.      Comments: trach  Abdominal:      General: Bowel sounds are normal.      Palpations: Abdomen is soft.   Musculoskeletal:         General: Normal range of motion.      Cervical back: Normal range of motion and neck supple.   Skin:     General: Skin is warm and dry.      Capillary Refill: Capillary refill takes 2 to 3 seconds.   Neurological:      General: No focal deficit present.      Mental Status: She is alert and oriented to person, place, and time. Mental status is at baseline.   Psychiatric:         Mood and Affect: Mood normal.         Behavior: Behavior normal.         Thought Content: Thought content normal.         Judgment: Judgment normal.       Significant Labs: All pertinent labs within the past 24 hours have been reviewed.  BMP: No results for input(s): GLU, NA, K, CL, CO2, BUN, CREATININE, CALCIUM, MG in the last 48 hours.  CBC:   Recent Labs   Lab 03/03/23  1544   WBC 9.0   HGB 8.9*   HCT 30.3*        CMP: No results for input(s): NA, K, CL, CO2, GLU, BUN,  CREATININE, CALCIUM, PROT, ALBUMIN, BILITOT, ALKPHOS, AST, ALT, ANIONGAP, EGFRNONAA in the last 48 hours.    Invalid input(s): ESTGFAFR[place]  Magnesium: No results for input(s): MG in the last 48 hours.    Significant Imaging: I have reviewed all pertinent imaging results/findings within the past 24 hours.      Assessment/Plan:      * Acute respiratory failure with hypoxia and hypercarbia  Patient with Hypercapnic Respiratory failure which is Acute on chronic.  she is on home oxygen at 2 LPM. Supplemental oxygen was provided and noted- Oxygen Concentration (%):  [40] 40.   Signs/symptoms of respiratory failure include- increased work of breathing, respiratory distress and wheezing. Contributing diagnoses includes - COPD Labs and images were reviewed. Patient Has recent ABG, which has been reviewed. Will treat underlying causes and adjust management of respiratory failure as follows- .\  Wean O2   CM setting up a Trilogy for home use  Follow labs    COPD exacerbation  resume current meds  Wean O2      Microcytic anemia  Monitor levels        VTE Risk Mitigation (From admission, onward)         Ordered     enoxaparin injection 30 mg  Daily         03/01/23 0131     IP VTE HIGH RISK PATIENT  Once         03/01/23 0131     Place sequential compression device  Until discontinued         03/01/23 0131              Respiratory to wean to keep O2 saturation at 90% or higher  Follow labs  DVT prophylaxis  CM working on Trilogy  Discharge Planning   LASHAWN:      Code Status: Full Code   Is the patient medically ready for discharge?:     Reason for patient still in hospital (select all that apply): Laboratory test, Treatment and Pending disposition  Discharge Plan A: Home Health   Discharge Delays: (!) Home Medical Equipment (Insurance, Delivery)              Juan M Mcrae MD  Department of Hospital Medicine   [facility]  Medical Surgical Unit

## 2023-03-05 NOTE — ASSESSMENT & PLAN NOTE
Patient with Hypercapnic Respiratory failure which is Acute on chronic.  she is on home oxygen at 2 LPM. Supplemental oxygen was provided and noted- Oxygen Concentration (%):  [40] 40.   Signs/symptoms of respiratory failure include- increased work of breathing, respiratory distress and wheezing. Contributing diagnoses includes - COPD Labs and images were reviewed. Patient Has recent ABG, which has been reviewed. Will treat underlying causes and adjust management of respiratory failure as follows- .\  Wean O2   CM setting up a Trilogy for home use  Follow labs

## 2023-03-05 NOTE — HOSPITAL COURSE
3/25/23-Will continue to wean O2 for lowest possible setting.  CM is working on a Trilogy machine for discharge.  Overall the patient seems to be doing better.    03/07/2023.    Patient is stable at her baseline.  Continue to be on oxygen via tracheostomy.    She stable.    Will obtain an ABG   Chest x-ray   Follow up with  to work arrangement for home trilogy machine.    03/08/2023  Patient is stable.    Continue p.o. antibiotic for additional 1 week.    Lasix 40 mg in the morning.  Low-dose lisinopril and beta blockers.  Patient with diastolic congestive heart failure fluid restriction to 1500 cc daily   Home health and wound care will continue.  Patient is being discharged home.    Follow up with pulmonology  Overall patient with a chronic advanced medical problems.  She is at high risk for hospitalization.

## 2023-03-06 LAB
ALBUMIN SERPL-MCNC: 2.1 G/DL (ref 3.4–4.8)
ALBUMIN/GLOB SERPL: 0.6 RATIO (ref 1.1–2)
ALP SERPL-CCNC: 61 UNIT/L (ref 40–150)
ALT SERPL-CCNC: 7 UNIT/L (ref 0–55)
AST SERPL-CCNC: 13 UNIT/L (ref 5–34)
BASOPHILS # BLD AUTO: 0.02 X10(3)/MCL (ref 0–0.2)
BASOPHILS NFR BLD AUTO: 0.3 %
BILIRUBIN DIRECT+TOT PNL SERPL-MCNC: 0.3 MG/DL
BUN SERPL-MCNC: 9 MG/DL (ref 9.8–20.1)
CALCIUM SERPL-MCNC: 8.8 MG/DL (ref 8.4–10.2)
CHLORIDE SERPL-SCNC: 97 MMOL/L (ref 98–107)
CO2 SERPL-SCNC: 33 MMOL/L (ref 23–31)
CREAT SERPL-MCNC: 0.68 MG/DL (ref 0.55–1.02)
EOSINOPHIL # BLD AUTO: 0.12 X10(3)/MCL (ref 0–0.9)
EOSINOPHIL NFR BLD AUTO: 2 %
ERYTHROCYTE [DISTWIDTH] IN BLOOD BY AUTOMATED COUNT: 22.1 % (ref 11.5–17)
GFR SERPLBLD CREATININE-BSD FMLA CKD-EPI: >60 MLS/MIN/1.73/M2
GLOBULIN SER-MCNC: 3.5 GM/DL (ref 2.4–3.5)
GLUCOSE SERPL-MCNC: 96 MG/DL (ref 82–115)
HCT VFR BLD AUTO: 30.4 % (ref 37–47)
HGB BLD-MCNC: 8.9 G/DL (ref 12–16)
IMM GRANULOCYTES # BLD AUTO: 0.03 X10(3)/MCL (ref 0–0.04)
IMM GRANULOCYTES NFR BLD AUTO: 0.5 %
LYMPHOCYTES # BLD AUTO: 0.64 X10(3)/MCL (ref 0.6–4.6)
LYMPHOCYTES NFR BLD AUTO: 10.5 %
MAGNESIUM SERPL-MCNC: 1.8 MG/DL (ref 1.6–2.6)
MCH RBC QN AUTO: 24.7 PG
MCHC RBC AUTO-ENTMCNC: 29.3 G/DL (ref 33–36)
MCV RBC AUTO: 84.4 FL (ref 80–94)
MONOCYTES # BLD AUTO: 0.47 X10(3)/MCL (ref 0.1–1.3)
MONOCYTES NFR BLD AUTO: 7.7 %
NEUTROPHILS # BLD AUTO: 4.79 X10(3)/MCL (ref 2.1–9.2)
NEUTROPHILS NFR BLD AUTO: 79 %
PLATELET # BLD AUTO: 222 X10(3)/MCL (ref 130–400)
PMV BLD AUTO: 11.1 FL (ref 7.4–10.4)
POCT GLUCOSE: 101 MG/DL (ref 70–110)
POCT GLUCOSE: 101 MG/DL (ref 70–110)
POCT GLUCOSE: 82 MG/DL (ref 70–110)
POCT GLUCOSE: 82 MG/DL (ref 70–110)
POTASSIUM SERPL-SCNC: 3.6 MMOL/L (ref 3.5–5.1)
PROT SERPL-MCNC: 5.6 GM/DL (ref 5.8–7.6)
RBC # BLD AUTO: 3.6 X10(6)/MCL (ref 4.2–5.4)
SODIUM SERPL-SCNC: 137 MMOL/L (ref 136–145)
WBC # SPEC AUTO: 6.1 X10(3)/MCL (ref 4.5–11.5)

## 2023-03-06 PROCEDURE — 94640 AIRWAY INHALATION TREATMENT: CPT

## 2023-03-06 PROCEDURE — 25000003 PHARM REV CODE 250: Performed by: INTERNAL MEDICINE

## 2023-03-06 PROCEDURE — 63600175 PHARM REV CODE 636 W HCPCS: Performed by: INTERNAL MEDICINE

## 2023-03-06 PROCEDURE — 99900035 HC TECH TIME PER 15 MIN (STAT)

## 2023-03-06 PROCEDURE — 94761 N-INVAS EAR/PLS OXIMETRY MLT: CPT

## 2023-03-06 PROCEDURE — 97162 PT EVAL MOD COMPLEX 30 MIN: CPT

## 2023-03-06 PROCEDURE — 83735 ASSAY OF MAGNESIUM: CPT | Performed by: INTERNAL MEDICINE

## 2023-03-06 PROCEDURE — 25000242 PHARM REV CODE 250 ALT 637 W/ HCPCS: Performed by: INTERNAL MEDICINE

## 2023-03-06 PROCEDURE — 11000001 HC ACUTE MED/SURG PRIVATE ROOM

## 2023-03-06 PROCEDURE — 99900026 HC AIRWAY MAINTENANCE (STAT)

## 2023-03-06 PROCEDURE — 27000221 HC OXYGEN, UP TO 24 HOURS

## 2023-03-06 PROCEDURE — 80053 COMPREHEN METABOLIC PANEL: CPT | Performed by: INTERNAL MEDICINE

## 2023-03-06 PROCEDURE — 85025 COMPLETE CBC W/AUTO DIFF WBC: CPT | Performed by: INTERNAL MEDICINE

## 2023-03-06 RX ADMIN — TRAMADOL HYDROCHLORIDE 50 MG: 50 TABLET, COATED ORAL at 10:03

## 2023-03-06 RX ADMIN — IPRATROPIUM BROMIDE AND ALBUTEROL SULFATE 3 ML: 2.5; .5 SOLUTION RESPIRATORY (INHALATION) at 11:03

## 2023-03-06 RX ADMIN — Medication 500 MG: at 09:03

## 2023-03-06 RX ADMIN — IPRATROPIUM BROMIDE AND ALBUTEROL SULFATE 3 ML: 2.5; .5 SOLUTION RESPIRATORY (INHALATION) at 03:03

## 2023-03-06 RX ADMIN — ASPIRIN 81 MG: 81 TABLET, COATED ORAL at 09:03

## 2023-03-06 RX ADMIN — TRAMADOL HYDROCHLORIDE 50 MG: 50 TABLET, COATED ORAL at 09:03

## 2023-03-06 RX ADMIN — FUROSEMIDE 20 MG: 20 TABLET ORAL at 09:03

## 2023-03-06 RX ADMIN — BUDESONIDE 0.5 MG: 0.5 INHALANT ORAL at 07:03

## 2023-03-06 RX ADMIN — ENOXAPARIN SODIUM 30 MG: 30 INJECTION SUBCUTANEOUS at 04:03

## 2023-03-06 RX ADMIN — TRAMADOL HYDROCHLORIDE 50 MG: 50 TABLET, COATED ORAL at 12:03

## 2023-03-06 RX ADMIN — IPRATROPIUM BROMIDE AND ALBUTEROL SULFATE 3 ML: 2.5; .5 SOLUTION RESPIRATORY (INHALATION) at 08:03

## 2023-03-06 RX ADMIN — IPRATROPIUM BROMIDE AND ALBUTEROL SULFATE 3 ML: 2.5; .5 SOLUTION RESPIRATORY (INHALATION) at 07:03

## 2023-03-06 RX ADMIN — ATORVASTATIN CALCIUM 80 MG: 40 TABLET, FILM COATED ORAL at 09:03

## 2023-03-06 RX ADMIN — AMIODARONE HYDROCHLORIDE 200 MG: 200 TABLET ORAL at 09:03

## 2023-03-06 RX ADMIN — LEVOFLOXACIN 500 MG: 500 TABLET, FILM COATED ORAL at 09:03

## 2023-03-06 RX ADMIN — BUDESONIDE 0.5 MG: 0.5 INHALANT ORAL at 08:03

## 2023-03-06 RX ADMIN — TRAMADOL HYDROCHLORIDE 50 MG: 50 TABLET, COATED ORAL at 04:03

## 2023-03-06 NOTE — PLAN OF CARE
Rec'd call from [facility] re: Trilogy.  Jonny has denied request for Trilogy, stating that they need documentation that patient was on BIPAP and has failed.  They are offering a peer to peer to be done.  Peer to peer has to be done by 3/8/23 at 12 NOON.  Will speak to Dr. Felix.

## 2023-03-06 NOTE — PT/OT/SLP EVAL
Physical Therapy Evaluation    Patient Name:  Michel De La Paz   MRN:  [id number]    Recommendations:     Discharge Recommendations: nursing facility, skilled, home with home health   Discharge Equipment Recommendations: walker, rolling   Barriers to discharge: None    Assessment:     Michel De La Paz is a 67 y.o. female admitted with a medical diagnosis of Acute respiratory failure with hypoxia and hypercarbia.  She presents with the following impairments/functional limitations: weakness, impaired endurance, impaired functional mobility, gait instability, impaired balance, pain.    Pt with trach, uses pen and paper for communication. She is from home where she lives with her grandchildren. She was fairly independent prior to being hospitalized. Today, she c/o significant pain on her bottom due to wound. She was able to perform bed mobility with Min A and stood to a RW with Min A. She was able to stand for 5min with no LOB and side stepped to the L a few steps. She would benefit from further skilled therapy before returning home in order to decrease risk of falls.     Rehab Prognosis: Good; patient would benefit from acute skilled PT services to address these deficits and reach maximum level of function.    Recent Surgery: * No surgery found *      Plan:     During this hospitalization, patient to be seen daily to address the identified rehab impairments via gait training, therapeutic activities, therapeutic exercises and progress toward the following goals:    Plan of Care Expires:  04/03/23    Subjective     Chief Complaint: pain on bottom  Patient/Family Comments/goals: to receive more therapy to get stronger  Pain/Comfort:  Pain Rating 1: 9/10  Location 1: sacral spine  Pain Addressed 1: Reposition    Patients cultural, spiritual, Confucianist conflicts given the current situation:      Living Environment:  Pt from home. Lives with grankids  Prior to admission, patients level of function was Mod I.  Equipment used  at home: cane, quad.    Objective:     Communicated with nurse prior to session.  Patient found left sidelying with Tracheostomy  upon PT entry to room.    General Precautions: Standard, fall  Orthopedic Precautions:    Braces:    Respiratory Status:  trach collar    Exams:  RLE Strength: Deficits: 3+/5  LLE Strength: Deficits: 3+/5    Functional Mobility:  Bed Mobility:     Supine to Sit: minimum assistance  Transfers:     Sit to Stand:  minimum assistance with rolling walker  Balance: CGA static standing supported      AM-PAC 6 CLICK MOBILITY  Total Score:        Treatment & Education:  See above    Patient left sitting edge of bed with all lines intact, call button in reach, nurse notified, and family present.    GOALS:   Multidisciplinary Problems       Physical Therapy Goals          Problem: Physical Therapy    Goal Priority Disciplines Outcome Goal Variances Interventions   Physical Therapy Goal     PT, PT/OT Ongoing, Progressing     Description: Goals to be met by: 4/3/23     Patient will increase functional independence with mobility by performin. Supine to sit with Modified [place]  2. Sit to stand transfer with Modified [place]  3. Gait  x 50 feet with Stand-by Assistance using Rolling Walker.                          History:     Past Medical History:   Diagnosis Date    CHF (congestive heart failure)     Chronic low back pain     COPD (chronic obstructive pulmonary disease)     Diabetes mellitus, type 2     Diverticular disease of large intestine without perforation or abscess     DM (diabetes mellitus)     GERD (gastroesophageal reflux disease)     Hypertension     Mild intermittent asthma, uncomplicated     Neuropathy     Respiratory failure     Sleep apnea        Past Surgical History:   Procedure Laterality Date    ARTHROSCOPY OF KNEE      BACK SURGERY      BREAST SURGERY      CARPAL TUNNEL RELEASE      CATARACT EXTRACTION      COLONOSCOPY  2020    HYSTERECTOMY      LEFT LUNG       MICROLARYNGOSCOPY WITH BIOPSY OF VOCAL CORD  2016       Time Tracking:     PT Received On: 03/06/23  PT Start Time: 1450     PT Stop Time: 1520  PT Total Time (min): 30 min     Billable Minutes: Evaluation 30 03/06/2023

## 2023-03-06 NOTE — PLAN OF CARE
Pt fly requesting hospital bed.  Dr. Felix placed PN for this.  Referral sent to [facility].

## 2023-03-06 NOTE — NURSING
Hospitalist notified if pt abnormal BPs. Pt started new pain medication tonight. Will continue to monitor.

## 2023-03-06 NOTE — PROGRESS NOTES
Hospital Medicine  Progress Note    Patient Name: Michel De La Paz  MRN: [id number]  Status: IP- Inpatient   Admission Date: 2/28/2023  Length of Stay: 6      CC: hospital follow-up for respiratory failure       SUBJECTIVE   67 year old with severe COPD and chronic respiratory failure, s/p prior tracheostomy recently presented back to ED, where she was found to be in acute respiratory acidosis, requiring placement back on vent.  Hypercapnia improved, weaned to trach collar. CM working on Trilogy device as recommended by Pulm.    Today: No new complaints or issues.   On 5L trach collar, but saturating at 97%.      MEDICATIONS   Scheduled   amiodarone  200 mg Oral BID    ascorbic acid (vitamin C)  500 mg Oral BID    aspirin  81 mg Oral Daily    atorvastatin  80 mg Oral Daily    budesonide  0.5 mg Nebulization BID    enoxaparin  30 mg Subcutaneous Daily    furosemide  20 mg Oral Daily    levoFLOXacin  500 mg Oral Daily    theophylline  400 mg Oral Daily     Continuous Infusions  None      PHYSICAL EXAM   VITALS: T 98.2 °F (36.8 °C)   BP (!) 108/50   P 67   RR 18   O2 (!) 88 %    GENERAL: Awake and in NAD  LUNGS: CTA B/L, though decreased air movement throughout  CVS: Normal rate  GI/: Soft, NT, bowel sounds positive.  EXTREMITIES: No peripheral edema  NEURO: AAOx3  PSYCH: Cooperative      LABS   CBC  Recent Labs     03/03/23  1544 03/06/23  0417   WBC 9.0 6.1   RBC 3.58* 3.60*   HGB 8.9* 8.9*   HCT 30.3* 30.4*   MCV 84.6 84.4   MCH 24.9 24.7   MCHC 29.4* 29.3*   RDW 22.9* 22.1*    222       CHEM  Recent Labs     03/06/23  0417      K 3.6   CHLORIDE 97*   CO2 33*   BUN 9.0*   CREATININE 0.68   GLUCOSE 96   CALCIUM 8.8   MG 1.80   ALBUMIN 2.1*   GLOBULIN 3.5   ALKPHOS 61   ALT 7   AST 13   BILITOT 0.3           MICROBIOLOGY     Microbiology Results (last 7 days)       Procedure Component Value Units Date/Time    Respiratory Culture [[id number]] Collected: 03/01/23 0942    Order Status: Completed Specimen:  Sputum from Endotracheal Aspirate Updated: 03/03/23 0830     Respiratory Culture No Growth     GRAM STAIN Quality 3+      No bacteria seen            ASSESSMENT   Acute on chronic hypercapnic and hypoxic respiratory failure  Severe COPD  Cardiomyopathy, compensated   NIDDM II  Unstageable sacral and stage II left thigh, pressure ulcers    PLAN   Wean supplemental oxygen as tolerated to maintain saturations 88-92%  Continue Levaquin for 1 more day to complete a 7-day antibiotic course  Otherwise continue current management  CM working on Trilogy device, will see about hospital bed  Continue wound care, plan for home with  once above arranged.      Prophylaxis: JANEY Murguia MD  Hospital Medicine      Due to medical comorbidities including but limited to chronic respiratory failure and pressure injuries, patient wound benefit from hospital bed at home to include gel mattress.  Patient unable position properly in standard bed, requiring frequent repositioning and  HOB elevation >30 degrees to increase comfort and ease of breathing.

## 2023-03-07 PROBLEM — Z93.0 TRACHEOSTOMY DEPENDENT: Chronic | Status: ACTIVE | Noted: 2023-03-07

## 2023-03-07 PROBLEM — J96.01 ACUTE RESPIRATORY FAILURE WITH HYPOXIA AND HYPERCARBIA: Chronic | Status: ACTIVE | Noted: 2023-01-22

## 2023-03-07 PROBLEM — J44.1 COPD EXACERBATION: Chronic | Status: ACTIVE | Noted: 2023-03-05

## 2023-03-07 PROBLEM — J96.02 ACUTE RESPIRATORY FAILURE WITH HYPOXIA AND HYPERCARBIA: Chronic | Status: ACTIVE | Noted: 2023-01-22

## 2023-03-07 PROBLEM — D50.9 MICROCYTIC ANEMIA: Chronic | Status: ACTIVE | Noted: 2023-01-22

## 2023-03-07 LAB
ALLENS TEST: ABNORMAL
DELSYS: ABNORMAL
HCO3 UR-SCNC: 36.9 MMOL/L (ref 24–28)
PCO2 BLDA: 57.1 MMHG (ref 35–45)
PH SMN: 7.42 [PH] (ref 7.35–7.45)
PO2 BLDA: 62 MMHG (ref 80–100)
POC BE: 12 MMOL/L
POC SATURATED O2: 91 % (ref 95–100)
POC TCO2: 39 MMOL/L (ref 23–27)
POCT GLUCOSE: 127 MG/DL (ref 70–110)
POCT GLUCOSE: 86 MG/DL (ref 70–110)
POCT GLUCOSE: 89 MG/DL (ref 70–110)
SAMPLE: ABNORMAL
SITE: ABNORMAL

## 2023-03-07 PROCEDURE — 25000003 PHARM REV CODE 250: Performed by: INTERNAL MEDICINE

## 2023-03-07 PROCEDURE — 99900026 HC AIRWAY MAINTENANCE (STAT)

## 2023-03-07 PROCEDURE — 25000242 PHARM REV CODE 250 ALT 637 W/ HCPCS: Performed by: INTERNAL MEDICINE

## 2023-03-07 PROCEDURE — 99900035 HC TECH TIME PER 15 MIN (STAT)

## 2023-03-07 PROCEDURE — 27000221 HC OXYGEN, UP TO 24 HOURS

## 2023-03-07 PROCEDURE — 94761 N-INVAS EAR/PLS OXIMETRY MLT: CPT

## 2023-03-07 PROCEDURE — 94640 AIRWAY INHALATION TREATMENT: CPT

## 2023-03-07 PROCEDURE — 63600175 PHARM REV CODE 636 W HCPCS: Performed by: INTERNAL MEDICINE

## 2023-03-07 PROCEDURE — 97530 THERAPEUTIC ACTIVITIES: CPT

## 2023-03-07 PROCEDURE — 11000001 HC ACUTE MED/SURG PRIVATE ROOM

## 2023-03-07 PROCEDURE — 36600 WITHDRAWAL OF ARTERIAL BLOOD: CPT

## 2023-03-07 PROCEDURE — 82803 BLOOD GASES ANY COMBINATION: CPT

## 2023-03-07 RX ADMIN — TRAMADOL HYDROCHLORIDE 50 MG: 50 TABLET, COATED ORAL at 07:03

## 2023-03-07 RX ADMIN — AMIODARONE HYDROCHLORIDE 200 MG: 200 TABLET ORAL at 08:03

## 2023-03-07 RX ADMIN — AMIODARONE HYDROCHLORIDE 200 MG: 200 TABLET ORAL at 10:03

## 2023-03-07 RX ADMIN — IPRATROPIUM BROMIDE AND ALBUTEROL SULFATE 3 ML: 2.5; .5 SOLUTION RESPIRATORY (INHALATION) at 07:03

## 2023-03-07 RX ADMIN — THEOPHYLLINE 400 MG: 400 TABLET, EXTENDED RELEASE ORAL at 09:03

## 2023-03-07 RX ADMIN — ENOXAPARIN SODIUM 30 MG: 30 INJECTION SUBCUTANEOUS at 06:03

## 2023-03-07 RX ADMIN — IPRATROPIUM BROMIDE AND ALBUTEROL SULFATE 3 ML: 2.5; .5 SOLUTION RESPIRATORY (INHALATION) at 04:03

## 2023-03-07 RX ADMIN — Medication 500 MG: at 10:03

## 2023-03-07 RX ADMIN — BUDESONIDE 0.5 MG: 0.5 INHALANT ORAL at 07:03

## 2023-03-07 RX ADMIN — ACETAMINOPHEN 650 MG: 325 TABLET, FILM COATED ORAL at 08:03

## 2023-03-07 RX ADMIN — IPRATROPIUM BROMIDE AND ALBUTEROL SULFATE 3 ML: 2.5; .5 SOLUTION RESPIRATORY (INHALATION) at 11:03

## 2023-03-07 RX ADMIN — Medication 500 MG: at 08:03

## 2023-03-07 RX ADMIN — TRAMADOL HYDROCHLORIDE 50 MG: 50 TABLET, COATED ORAL at 05:03

## 2023-03-07 RX ADMIN — LEVOFLOXACIN 500 MG: 500 TABLET, FILM COATED ORAL at 10:03

## 2023-03-07 RX ADMIN — FUROSEMIDE 20 MG: 20 TABLET ORAL at 10:03

## 2023-03-07 RX ADMIN — IPRATROPIUM BROMIDE AND ALBUTEROL SULFATE 3 ML: 2.5; .5 SOLUTION RESPIRATORY (INHALATION) at 03:03

## 2023-03-07 RX ADMIN — ATORVASTATIN CALCIUM 80 MG: 40 TABLET, FILM COATED ORAL at 10:03

## 2023-03-07 RX ADMIN — ASPIRIN 81 MG: 81 TABLET, COATED ORAL at 10:03

## 2023-03-07 NOTE — PLAN OF CARE
[facility] sent in incorrect information on their send, to insurance for approval for Trilogy.  Information will have to be re-submitted to insurance for Trilogy auth.

## 2023-03-07 NOTE — PLAN OF CARE
Re-Sent [facility] an addendum on PN stating more documentation needed for authorization for hospital bed.  They stated that they did not rec'v yesterday, although I did send it.

## 2023-03-07 NOTE — PROGRESS NOTES
[facility]  [facility] Medicine  Progress Note    Patient Name: Michel De La Paz  MRN: [id number]  Patient Class: IP- Inpatient   Admission Date: 2/28/2023  Length of Stay: 7 days  Attending Physician: Donavon Davenport MD  Primary Care Provider: WESLY Best        Subjective:     Principal Problem:Acute respiratory failure with hypoxia and hypercarbia        HPI:   68 yo female with hx of Ch resp failure with trach collar, NIDDM, HTN, CHF, Parox Fib, COPD who had prolong hospitalization at [place] then at [facility], Patient is brought to the emergency room with complaint of generalized weakness for the last few days patient is on home oxygen recent tracheostomy for respiratory failure she had a fall she was found to have elevated Co2 she was placed on Vent with improvement of Resp acidosis. She C/O fall and Left hip pain. No chest pain, No cough, no Fever, no chills      Overview/Hospital Course:  3/25/23-Will continue to wean O2 for lowest possible setting.  CM is working on a Trilogy machine for discharge.  Overall the patient seems to be doing better.  03/07/2023.    Patient is stable at her baseline.  Continue to be on oxygen via tracheostomy.    She stable.    Will obtain an ABG   Chest x-ray   Follow up with  to work arrangement for home trilogy machine.      Interval History:  Patient is stable.  No changes over last 24 hours.  Still cough but no sputum production    Review of Systems   Constitutional:  Positive for activity change and appetite change.   HENT: Negative.     Respiratory:  Positive for cough, shortness of breath and wheezing. Negative for choking and chest tightness.    All other systems reviewed and are negative.  Objective:     Vital Signs (Most Recent):  Temp: 98 °F (36.7 °C) (03/07/23 1501)  Pulse: 64 (03/07/23 1501)  Resp: 20 (03/07/23 1500)  BP: (!) 102/54 (03/07/23 1501)  SpO2: 100 % (03/07/23 1501)   Vital Signs (24h  Range):  Temp:  [97.7 °F (36.5 °C)-98.2 °F (36.8 °C)] 98 °F (36.7 °C)  Pulse:  [62-88] 64  Resp:  [18-24] 20  SpO2:  [83 %-100 %] 100 %  BP: (100-130)/(53-66) 102/54     Weight: 57 kg (125 lb 10.6 oz)  Body mass index is 21.57 kg/m².    Intake/Output Summary (Last 24 hours) at 3/7/2023 1508  Last data filed at 3/7/2023 1210  Gross per 24 hour   Intake 960 ml   Output 2600 ml   Net -1640 ml      Physical Exam  Vitals and nursing note reviewed.   Constitutional:       General: She is awake. She is not in acute distress.     Appearance: She is underweight. She is not ill-appearing, toxic-appearing or diaphoretic.   HENT:      Head: Normocephalic and atraumatic.   Eyes:      General: Lids are normal. Lids are everted, no foreign bodies appreciated.   Neck:     Cardiovascular:      Rate and Rhythm: Normal rate and regular rhythm.      Heart sounds: No murmur heard.  Pulmonary:      Effort: No respiratory distress.      Breath sounds: Wheezing and rhonchi present.   Abdominal:      General: There is no distension.      Tenderness: There is no abdominal tenderness.   Musculoskeletal:         General: Normal range of motion.      Cervical back: Full passive range of motion without pain and normal range of motion.   Skin:     General: Skin is warm.      Capillary Refill: Capillary refill takes less than 2 seconds.   Neurological:      General: No focal deficit present.      Mental Status: She is alert.   Psychiatric:         Mood and Affect: Mood normal.         Behavior: Behavior is cooperative.       Significant Labs: All pertinent labs within the past 24 hours have been reviewed.  Recent Lab Results         03/07/23  0631   03/06/23  2205   03/06/23  1614        POCT Glucose 89   82   82               Significant Imaging: I have reviewed all pertinent imaging results/findings within the past 24 hours.      Assessment/Plan:      * Acute respiratory failure with hypoxia and hypercarbia  Continue current treatment  ABG    Chest x-ray.  Follow up with  for trilogy machine placement.    COPD exacerbation  As needed nebulizer treatment.  Wean oxygen try to keep it to 88 percentile and above saturation.      Microcytic anemia  Monitor levels      Tracheostomy dependent  Continue current care.      VTE Risk Mitigation (From admission, onward)         Ordered     enoxaparin injection 30 mg  Daily         03/01/23 0131     IP VTE HIGH RISK PATIENT  Once         03/01/23 0131     Place sequential compression device  Until discontinued         03/01/23 0131                Discharge Planning   LASHAWN:      Code Status: Full Code   Is the patient medically ready for discharge?:     Reason for patient still in hospital (select all that apply): Pending disposition  Discharge Plan A: Home Health   Discharge Delays: (!) Home Medical Equipment (Insurance, Delivery)        Patient is stable to be discharged home any time.      Charli Monk MD  Department of Hospital Medicine   [facility]

## 2023-03-07 NOTE — PLAN OF CARE
03/07/23 1159   Discharge Reassessment   Assessment Type Discharge Planning Reassessment   Discharge Plan discussed with: Patient   Discharge Plan A Home Health   Discharge Plan B Home Health   DME Needed Upon Discharge  other (see comments)   Discharge Barriers Identified Other (see comments)   Why the patient remains in the hospital Requires continued medical care   Post-Acute Status   Post-Acute Authorization HME   HME Status Pending payor review   Hospital Resources/Appts/Education Provided Post-Acute resouces added to AVS   Discharge Delays (!) Home Medical Equipment (Insurance, Delivery)     Trilogy pending w/ [employer], insurance auth pending.  Pt has home O2 and portable. Hospital bed pending [facility], insurance auth pending and she stated that she cannot d/c home without the bed due to painful sacral decubitus.   Informed pt fly member in room to bring portable here for d/c home.  Pt agreed to use [facility].  Referral sent.

## 2023-03-07 NOTE — SUBJECTIVE & OBJECTIVE
Interval History:  Patient is stable.  No changes over last 24 hours.  Still cough but no sputum production    Review of Systems   Constitutional:  Positive for activity change and appetite change.   HENT: Negative.     Respiratory:  Positive for cough, shortness of breath and wheezing. Negative for choking and chest tightness.    All other systems reviewed and are negative.  Objective:     Vital Signs (Most Recent):  Temp: 98 °F (36.7 °C) (03/07/23 1501)  Pulse: 64 (03/07/23 1501)  Resp: 20 (03/07/23 1500)  BP: (!) 102/54 (03/07/23 1501)  SpO2: 100 % (03/07/23 1501)   Vital Signs (24h Range):  Temp:  [97.7 °F (36.5 °C)-98.2 °F (36.8 °C)] 98 °F (36.7 °C)  Pulse:  [62-88] 64  Resp:  [18-24] 20  SpO2:  [83 %-100 %] 100 %  BP: (100-130)/(53-66) 102/54     Weight: 57 kg (125 lb 10.6 oz)  Body mass index is 21.57 kg/m².    Intake/Output Summary (Last 24 hours) at 3/7/2023 1508  Last data filed at 3/7/2023 1210  Gross per 24 hour   Intake 960 ml   Output 2600 ml   Net -1640 ml      Physical Exam  Vitals and nursing note reviewed.   Constitutional:       General: She is awake. She is not in acute distress.     Appearance: She is underweight. She is not ill-appearing, toxic-appearing or diaphoretic.   HENT:      Head: Normocephalic and atraumatic.   Eyes:      General: Lids are normal. Lids are everted, no foreign bodies appreciated.   Neck:     Cardiovascular:      Rate and Rhythm: Normal rate and regular rhythm.      Heart sounds: No murmur heard.  Pulmonary:      Effort: No respiratory distress.      Breath sounds: Wheezing and rhonchi present.   Abdominal:      General: There is no distension.      Tenderness: There is no abdominal tenderness.   Musculoskeletal:         General: Normal range of motion.      Cervical back: Full passive range of motion without pain and normal range of motion.   Skin:     General: Skin is warm.      Capillary Refill: Capillary refill takes less than 2 seconds.   Neurological:      General:  No focal deficit present.      Mental Status: She is alert.   Psychiatric:         Mood and Affect: Mood normal.         Behavior: Behavior is cooperative.       Significant Labs: All pertinent labs within the past 24 hours have been reviewed.  Recent Lab Results         03/07/23  0631   03/06/23  2205   03/06/23  1614        POCT Glucose 89   82   82               Significant Imaging: I have reviewed all pertinent imaging results/findings within the past 24 hours.

## 2023-03-07 NOTE — PT/OT/SLP PROGRESS
Physical Therapy Progress Note    Patient Name:  Michel De La Paz   MRN:  [id number]    Recommendations:     Discharge Recommendations: nursing facility, skilled, home with home health   Discharge Equipment Recommendations: walker, rolling   Barriers to discharge: None    Assessment:     Michel De La Paz is a 67 y.o. female admitted with a medical diagnosis of Acute respiratory failure with hypoxia and hypercarbia.  She presents with the following impairments/functional limitations: weakness, impaired endurance, impaired functional mobility, gait instability, impaired balance, pain.    Pt able to stand to RW and take side steps along bed to both directions with CGA. Pt performed marching in place with great tolerance. Distance for ambulating limited due to trach collar.      Rehab Prognosis: Good; patient would benefit from acute skilled PT services to address these deficits and reach maximum level of function.    Recent Surgery: * No surgery found *      Plan:     During this hospitalization, patient to be seen daily to address the identified rehab impairments via gait training, therapeutic activities, therapeutic exercises and progress toward the following goals:    Plan of Care Expires:  04/03/23    Subjective     Chief Complaint: pain on bottom  Patient/Family Comments/goals: to receive more therapy to get stronger  Pain/Comfort:       Patients cultural, spiritual, Mosque conflicts given the current situation:      Objective:     Communicated with nurse prior to session.  Patient found left sidelying with Tracheostomy  upon PT entry to room.    General Precautions: Standard, fall  Orthopedic Precautions:    Braces:    Respiratory Status:  trach collar      Functional Mobility:  Bed Mobility:     Supine to Sit: minimum assistance  Transfers:     Sit to Stand:  minimum assistance with rolling walker  Balance: CGA static standing supported      AM-PAC 6 CLICK MOBILITY  Total Score:        Treatment &  Education:  See above  Side stepping to L and R with RW x 5min  Marching in place with RW x 5 min    Patient left sitting edge of bed with all lines intact, call button in reach, nurse notified, and family present.    GOALS:   Multidisciplinary Problems       Physical Therapy Goals          Problem: Physical Therapy    Goal Priority Disciplines Outcome Goal Variances Interventions   Physical Therapy Goal     PT, PT/OT Ongoing, Progressing     Description: Goals to be met by: 4/3/23     Patient will increase functional independence with mobility by performin. Supine to sit with Modified [place]  2. Sit to stand transfer with Modified [place]  3. Gait  x 50 feet with Stand-by Assistance using Rolling Walker.                          History:     Past Medical History:   Diagnosis Date    CHF (congestive heart failure)     Chronic low back pain     COPD (chronic obstructive pulmonary disease)     Diabetes mellitus, type 2     Diverticular disease of large intestine without perforation or abscess     DM (diabetes mellitus)     GERD (gastroesophageal reflux disease)     Hypertension     Mild intermittent asthma, uncomplicated     Neuropathy     Respiratory failure     Sleep apnea        Past Surgical History:   Procedure Laterality Date    ARTHROSCOPY OF KNEE      BACK SURGERY      BREAST SURGERY      CARPAL TUNNEL RELEASE      CATARACT EXTRACTION      COLONOSCOPY  2020    HYSTERECTOMY      LEFT LUNG      MICROLARYNGOSCOPY WITH BIOPSY OF VOCAL CORD         Time Tracking:     PT Received On: 23  PT Start Time:    0956  PT Stop Time: 1016  PT Total Time (min): 30 min     Billable Minutes: Therapeutic Activity 2023

## 2023-03-07 NOTE — ASSESSMENT & PLAN NOTE
As needed nebulizer treatment.  Wean oxygen try to keep it to 88 percentile and above saturation.

## 2023-03-08 VITALS
HEART RATE: 60 BPM | WEIGHT: 125.69 LBS | RESPIRATION RATE: 18 BRPM | HEIGHT: 64 IN | OXYGEN SATURATION: 97 % | SYSTOLIC BLOOD PRESSURE: 107 MMHG | BODY MASS INDEX: 21.46 KG/M2 | DIASTOLIC BLOOD PRESSURE: 60 MMHG | TEMPERATURE: 98 F

## 2023-03-08 PROBLEM — I50.33 CHF (CONGESTIVE HEART FAILURE), NYHA CLASS III, ACUTE ON CHRONIC, DIASTOLIC: Chronic | Status: ACTIVE | Noted: 2023-03-08

## 2023-03-08 LAB
ALBUMIN SERPL-MCNC: 2.3 G/DL (ref 3.4–4.8)
ALBUMIN/GLOB SERPL: 0.7 RATIO (ref 1.1–2)
ALP SERPL-CCNC: 58 UNIT/L (ref 40–150)
ALT SERPL-CCNC: 9 UNIT/L (ref 0–55)
AST SERPL-CCNC: 16 UNIT/L (ref 5–34)
BASOPHILS # BLD AUTO: 0.03 X10(3)/MCL (ref 0–0.2)
BASOPHILS NFR BLD AUTO: 0.5 %
BILIRUBIN DIRECT+TOT PNL SERPL-MCNC: 0.3 MG/DL
BUN SERPL-MCNC: 10 MG/DL (ref 9.8–20.1)
CALCIUM SERPL-MCNC: 9 MG/DL (ref 8.4–10.2)
CHLORIDE SERPL-SCNC: 95 MMOL/L (ref 98–107)
CO2 SERPL-SCNC: 35 MMOL/L (ref 23–31)
CREAT SERPL-MCNC: 0.65 MG/DL (ref 0.55–1.02)
CRP SERPL-MCNC: 17.5 MG/L
EOSINOPHIL # BLD AUTO: 0.22 X10(3)/MCL (ref 0–0.9)
EOSINOPHIL NFR BLD AUTO: 3.6 %
ERYTHROCYTE [DISTWIDTH] IN BLOOD BY AUTOMATED COUNT: 21.9 % (ref 11.5–17)
GFR SERPLBLD CREATININE-BSD FMLA CKD-EPI: >60 MLS/MIN/1.73/M2
GLOBULIN SER-MCNC: 3.5 GM/DL (ref 2.4–3.5)
GLUCOSE SERPL-MCNC: 85 MG/DL (ref 82–115)
HCT VFR BLD AUTO: 31.1 % (ref 37–47)
HGB BLD-MCNC: 9 G/DL (ref 12–16)
IMM GRANULOCYTES # BLD AUTO: 0.03 X10(3)/MCL (ref 0–0.04)
IMM GRANULOCYTES NFR BLD AUTO: 0.5 %
LYMPHOCYTES # BLD AUTO: 0.5 X10(3)/MCL (ref 0.6–4.6)
LYMPHOCYTES NFR BLD AUTO: 8.2 %
MCH RBC QN AUTO: 24.4 PG
MCHC RBC AUTO-ENTMCNC: 28.9 G/DL (ref 33–36)
MCV RBC AUTO: 84.3 FL (ref 80–94)
MONOCYTES # BLD AUTO: 0.56 X10(3)/MCL (ref 0.1–1.3)
MONOCYTES NFR BLD AUTO: 9.2 %
NEUTROPHILS # BLD AUTO: 4.73 X10(3)/MCL (ref 2.1–9.2)
NEUTROPHILS NFR BLD AUTO: 78 %
PLATELET # BLD AUTO: 238 X10(3)/MCL (ref 130–400)
PMV BLD AUTO: 10.6 FL (ref 7.4–10.4)
POCT GLUCOSE: 80 MG/DL (ref 70–110)
POTASSIUM SERPL-SCNC: 4 MMOL/L (ref 3.5–5.1)
PROT SERPL-MCNC: 5.8 GM/DL (ref 5.8–7.6)
RBC # BLD AUTO: 3.69 X10(6)/MCL (ref 4.2–5.4)
SODIUM SERPL-SCNC: 136 MMOL/L (ref 136–145)
WBC # SPEC AUTO: 6.1 X10(3)/MCL (ref 4.5–11.5)

## 2023-03-08 PROCEDURE — 25000003 PHARM REV CODE 250: Performed by: INTERNAL MEDICINE

## 2023-03-08 PROCEDURE — 94640 AIRWAY INHALATION TREATMENT: CPT

## 2023-03-08 PROCEDURE — 27000221 HC OXYGEN, UP TO 24 HOURS

## 2023-03-08 PROCEDURE — 80053 COMPREHEN METABOLIC PANEL: CPT | Performed by: EMERGENCY MEDICINE

## 2023-03-08 PROCEDURE — 25000242 PHARM REV CODE 250 ALT 637 W/ HCPCS: Performed by: INTERNAL MEDICINE

## 2023-03-08 PROCEDURE — 85025 COMPLETE CBC W/AUTO DIFF WBC: CPT | Performed by: EMERGENCY MEDICINE

## 2023-03-08 PROCEDURE — 86140 C-REACTIVE PROTEIN: CPT | Performed by: EMERGENCY MEDICINE

## 2023-03-08 RX ORDER — FUROSEMIDE 40 MG/1
40 TABLET ORAL 2 TIMES DAILY
Qty: 60 TABLET | Refills: 11 | Status: SHIPPED | OUTPATIENT
Start: 2023-03-08 | End: 2023-03-08 | Stop reason: SDUPTHER

## 2023-03-08 RX ORDER — POTASSIUM CHLORIDE 750 MG/1
10 CAPSULE, EXTENDED RELEASE ORAL ONCE
Qty: 1 CAPSULE | Refills: 0 | Status: SHIPPED | OUTPATIENT
Start: 2023-03-08 | End: 2023-03-08

## 2023-03-08 RX ORDER — LEVOFLOXACIN 500 MG/1
500 TABLET, FILM COATED ORAL DAILY
Qty: 5 TABLET | Refills: 0 | Status: SHIPPED | OUTPATIENT
Start: 2023-03-08 | End: 2023-03-08 | Stop reason: SDUPTHER

## 2023-03-08 RX ORDER — ASPIRIN 81 MG/1
81 TABLET ORAL DAILY
Qty: 30 TABLET | Refills: 0 | Status: SHIPPED | OUTPATIENT
Start: 2023-03-09 | End: 2024-03-08

## 2023-03-08 RX ORDER — IPRATROPIUM BROMIDE AND ALBUTEROL SULFATE 2.5; .5 MG/3ML; MG/3ML
3 SOLUTION RESPIRATORY (INHALATION) EVERY 4 HOURS PRN
Qty: 75 ML | Refills: 0 | Status: SHIPPED | OUTPATIENT
Start: 2023-03-08 | End: 2024-02-21

## 2023-03-08 RX ORDER — CARVEDILOL 3.12 MG/1
3.12 TABLET ORAL 2 TIMES DAILY
Qty: 60 TABLET | Refills: 11 | Status: SHIPPED | OUTPATIENT
Start: 2023-03-08 | End: 2023-08-23 | Stop reason: SDUPTHER

## 2023-03-08 RX ORDER — CARVEDILOL 3.12 MG/1
3.12 TABLET ORAL 2 TIMES DAILY
Qty: 60 TABLET | Refills: 11 | Status: SHIPPED | OUTPATIENT
Start: 2023-03-08 | End: 2023-03-08 | Stop reason: SDUPTHER

## 2023-03-08 RX ORDER — FUROSEMIDE 40 MG/1
40 TABLET ORAL DAILY
Qty: 30 TABLET | Refills: 11 | Status: SHIPPED | OUTPATIENT
Start: 2023-03-08 | End: 2023-03-27

## 2023-03-08 RX ORDER — LEVOFLOXACIN 500 MG/1
500 TABLET, FILM COATED ORAL DAILY
Qty: 5 TABLET | Refills: 0 | Status: SHIPPED | OUTPATIENT
Start: 2023-03-08 | End: 2023-06-21

## 2023-03-08 RX ORDER — LISINOPRIL 2.5 MG/1
2.5 TABLET ORAL DAILY
Qty: 90 TABLET | Refills: 3 | Status: SHIPPED | OUTPATIENT
Start: 2023-03-08 | End: 2023-03-08 | Stop reason: SDUPTHER

## 2023-03-08 RX ORDER — LISINOPRIL 2.5 MG/1
2.5 TABLET ORAL DAILY
Qty: 90 TABLET | Refills: 3 | Status: SHIPPED | OUTPATIENT
Start: 2023-03-08 | End: 2023-03-27 | Stop reason: SDUPTHER

## 2023-03-08 RX ADMIN — IPRATROPIUM BROMIDE AND ALBUTEROL SULFATE 3 ML: 2.5; .5 SOLUTION RESPIRATORY (INHALATION) at 07:03

## 2023-03-08 RX ADMIN — IPRATROPIUM BROMIDE AND ALBUTEROL SULFATE 3 ML: 2.5; .5 SOLUTION RESPIRATORY (INHALATION) at 11:03

## 2023-03-08 RX ADMIN — TRAMADOL HYDROCHLORIDE 50 MG: 50 TABLET, COATED ORAL at 07:03

## 2023-03-08 RX ADMIN — BUDESONIDE 0.5 MG: 0.5 INHALANT ORAL at 08:03

## 2023-03-08 RX ADMIN — Medication 500 MG: at 08:03

## 2023-03-08 RX ADMIN — THEOPHYLLINE 400 MG: 400 TABLET, EXTENDED RELEASE ORAL at 09:03

## 2023-03-08 RX ADMIN — ASPIRIN 81 MG: 81 TABLET, COATED ORAL at 08:03

## 2023-03-08 RX ADMIN — ATORVASTATIN CALCIUM 80 MG: 40 TABLET, FILM COATED ORAL at 08:03

## 2023-03-08 RX ADMIN — FUROSEMIDE 20 MG: 20 TABLET ORAL at 08:03

## 2023-03-08 RX ADMIN — LEVOFLOXACIN 500 MG: 500 TABLET, FILM COATED ORAL at 08:03

## 2023-03-08 RX ADMIN — AMIODARONE HYDROCHLORIDE 200 MG: 200 TABLET ORAL at 08:03

## 2023-03-08 NOTE — PLAN OF CARE
Patient has no ride home.  Informed nurse that she can call \[facility]\"" and bill trip to contract / [facility] and \[facility]\"".    [facility] cannot take pt d/t staffing at capacity, referral sent to [facility].

## 2023-03-08 NOTE — PLAN OF CARE
03/08/23 1256   Final Note   Assessment Type Final Discharge Note   Anticipated Discharge Disposition Home-[facility]   Hospital Resources/Appts/Education Provided Post-Acute resouces added to AVS   Post-Acute Status   Post-Acute Authorization Home Health   HME Status Set-up Complete/Auth obtained   Home Health Status Set-up Complete/Auth obtained   Discharge Delays None known at this time     [facility]  could not accept due to them out of area.  Referral sent to [facility].  Pt Trilogy and Hospital bed are approved and are being delivered to the home.  Pt is d/c home today.  D/C info sent to [facility].

## 2023-03-08 NOTE — PLAN OF CARE
[facility] cannot take pt, they are at capacity for managed care medicare.   Referral sent to [facility].  [facility] cannot take [employer] pt.  Referral sent to [facility]  and to [facility].

## 2023-03-08 NOTE — ASSESSMENT & PLAN NOTE
Patient is stable at her baseline.  Will be discharged home today to follow up with pulmonology as outpatient.

## 2023-03-08 NOTE — NURSING
Called [facility] to confirm Hospital bed being delivered to pt home before discharge. It is in route to home. Called [facility] and spoke to [facility] for pt to be transported home.

## 2023-03-08 NOTE — PLAN OF CARE
Problem: Infection  Goal: Absence of Infection Signs and Symptoms  Outcome: Ongoing, Progressing     Problem: Adult Inpatient Plan of Care  Goal: Plan of Care Review  Outcome: Ongoing, Progressing  Goal: Patient-Specific Goal (Individualized)  Outcome: Ongoing, Progressing  Goal: Absence of Hospital-Acquired Illness or Injury  Outcome: Ongoing, Progressing  Goal: Optimal Comfort and Wellbeing  Outcome: Ongoing, Progressing  Goal: Readiness for Transition of Care  Outcome: Ongoing, Progressing     Problem: Communication Impairment (Mechanical Ventilation, Invasive)  Goal: Effective Communication  Outcome: Ongoing, Progressing     Problem: Device-Related Complication Risk (Mechanical Ventilation, Invasive)  Goal: Optimal Device Function  Outcome: Ongoing, Progressing     Problem: Inability to Wean (Mechanical Ventilation, Invasive)  Goal: Mechanical Ventilation Liberation  Outcome: Ongoing, Progressing     Problem: Nutrition Impairment (Mechanical Ventilation, Invasive)  Goal: Optimal Nutrition Delivery  Outcome: Ongoing, Progressing     Problem: Skin and Tissue Injury (Mechanical Ventilation, Invasive)  Goal: Absence of Device-Related Skin and Tissue Injury  Outcome: Ongoing, Progressing     Problem: Ventilator-Induced Lung Injury (Mechanical Ventilation, Invasive)  Goal: Absence of Ventilator-Induced Lung Injury  Outcome: Ongoing, Progressing     Problem: Communication Impairment (Artificial Airway)  Goal: Effective Communication  Outcome: Ongoing, Progressing     Problem: Device-Related Complication Risk (Artificial Airway)  Goal: Optimal Device Function  Outcome: Ongoing, Progressing     Problem: Skin and Tissue Injury (Artificial Airway)  Goal: Absence of Device-Related Skin or Tissue Injury  Outcome: Ongoing, Progressing     Problem: Noninvasive Ventilation Acute  Goal: Effective Unassisted Ventilation and Oxygenation  Outcome: Ongoing, Progressing     Problem: Adjustment to Illness (Sepsis/Septic  Shock)  Goal: Optimal Coping  Outcome: Ongoing, Progressing     Problem: Bleeding (Sepsis/Septic Shock)  Goal: Absence of Bleeding  Outcome: Ongoing, Progressing     Problem: Glycemic Control Impaired (Sepsis/Septic Shock)  Goal: Blood Glucose Level Within Desired Range  Outcome: Ongoing, Progressing     Problem: Infection Progression (Sepsis/Septic Shock)  Goal: Absence of Infection Signs and Symptoms  Outcome: Ongoing, Progressing     Problem: Nutrition Impaired (Sepsis/Septic Shock)  Goal: Optimal Nutrition Intake  Outcome: Ongoing, Progressing     Problem: Impaired Wound Healing  Goal: Optimal Wound Healing  Outcome: Ongoing, Progressing     Problem: Skin Injury Risk Increased  Goal: Skin Health and Integrity  Outcome: Ongoing, Progressing     Problem: Balance Impairment (Functional Deficit)  Goal: Improved Balance and Postural Control  Outcome: Ongoing, Progressing     Problem: Muscle Strength Impairment (Functional Deficit)  Goal: Improved Muscle Strength  Outcome: Ongoing, Progressing

## 2023-03-08 NOTE — PLAN OF CARE
Hospital bed is approved and being set up by [facility] now.  Pt is d/c home today.  Notified [facility] to set up Trilogy.

## 2023-03-08 NOTE — DISCHARGE SUMMARY
[facility] - Medical Surgical Unit  Hospital Medicine  Discharge Summary      Patient Name: Michel De La Paz  MRN: [id number]  NASIR: [id number]  Patient Class: IP- Inpatient  Admission Date: 2/28/2023  Hospital Length of Stay: 8 days  Discharge Date and Time:  03/08/2023 12:19 PM  Attending Physician: Donavon Davenport MD   Discharging Provider: Charli Monk MD  Primary Care Provider: WESLY Best    Primary Care Team: Networked reference to record PCT     HPI:    68 yo female with hx of Ch resp failure with trach collar, NIDDM, HTN, CHF, Parox Fib, COPD who had prolong hospitalization at [facility] then at [facility], Patient is brought to the emergency room with complaint of generalized weakness for the last few days patient is on home oxygen recent tracheostomy for respiratory failure she had a fall she was found to have elevated Co2 she was placed on Vent with improvement of Resp acidosis. She C/O fall and Left hip pain. No chest pain, No cough, no Fever, no chills      * No surgery found *      Hospital Course:   3/25/23-Will continue to wean O2 for lowest possible setting.  CM is working on a Trilogy machine for discharge.  Overall the patient seems to be doing better.    03/07/2023.    Patient is stable at her baseline.  Continue to be on oxygen via tracheostomy.    She stable.    Will obtain an ABG   Chest x-ray   Follow up with  to work arrangement for home trilogy machine.    03/08/2023  Patient is stable.    Continue p.o. antibiotic for additional 1 week.    Lasix 40 mg in the morning.  Low-dose lisinopril and beta blockers.  Patient with diastolic congestive heart failure fluid restriction to 1500 cc daily   Home health and wound care will continue.  Patient is being discharged home.    Follow up with pulmonology  Overall patient with a chronic advanced medical problems.  She is at high risk for hospitalization.       Goals of Care Treatment Preferences:  Code Status:  Full Code      Consults:   Consults (From admission, onward)        Status Ordering Provider     Inpatient consult to Care Coordinator  Once        Provider:  (Not yet assigned)    Acknowledged DURGA DAHL     Inpatient consult to Pulmonology  Once        Provider:  Durga Dahl MD    Completed SHARDA KELLEY          Pulmonary  * Acute respiratory failure with hypoxia and hypercarbia  Patient is stable at her baseline.  Will be discharged home today to follow up with pulmonology as outpatient.    COPD exacerbation  As needed nebulizer treatment.  Wean oxygen try to keep it to 88 percentile and above saturation.      Cardiac/Vascular  CHF (congestive heart failure), NYHA class III, acute on chronic, diastolic  Congestive heart failure diastolic dysfunction acute on chronic and present on arrival to emergency room.    Patient will be discharged home on Lasix 40 mg in the morning.    Low-dose of lisinopril and beta blockers and fluid restriction.    Home health to closely monitor patient regarding fluid intake and congestive heart failure exacerbation.      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    Specialty: Family Medicine  Contact information:  [facility] [place] [address]  [phone]                       Patient Instructions:      HOSPITAL BED FOR HOME USE  "    Order Specific Question Answer Comments   Type: Semi-electric    Length of need (1-99 months): 99    Height: 5' 4" (1.626 m)    Weight: 57 kg (125 lb 10.6 oz)    Please check all that apply: Patient requires positioning of the body in ways not feasible in an ordinary bed due to a medical condition which is expected to last at least one month.    Please check all that apply: Patient requires, for the alleviation of pain, positioning of the body in ways not feasible in an ordinary bed.    Please check all that apply: Patient requires the head of bed to be elevated more than 30 degrees most of the time due to congestive heart failure, chronic pulmonary disease, or aspiration.  Pillows and wedges have been considered and ruled out.    Please check all that apply: Patient requires a bed height different than a fixed height hospital bed to permit transfers to chair, wheelchair, or standing.    Please check all that apply: Patient requires frequent changes in body position and/or has an immediate need for a change in body position.      Ambulatory referral/consult to Home Health   Standing Status: Future   Referral Priority: Routine Referral Type: Home Health   Referral Reason: Specialty Services Required   Requested Specialty: Home Health Services   Number of Visits Requested: 1     Diet Cardiac   Order Comments: Fluid restrictions to 1500 cc daily     Notify your health care provider if you experience any of the following:  persistent nausea and vomiting or diarrhea     Notify your health care provider if you experience any of the following:  temperature >100.4     Activity as tolerated       Significant Diagnostic Studies: Labs:   BMP:   Recent Labs   Lab 03/08/23 0458      K 4.0   CO2 35*   BUN 10.0   CREATININE 0.65   CALCIUM 9.0   , CMP   Recent Labs   Lab 03/08/23 0458      K 4.0   CO2 35*   BUN 10.0   CREATININE 0.65   CALCIUM 9.0   ALBUMIN 2.3*   BILITOT 0.3   ALKPHOS 58   AST 16   ALT 9    and " CBC   Recent Labs   Lab 03/08/23  0458   WBC 6.1   HGB 9.0*   HCT 31.1*          Pending Diagnostic Studies:     Procedure Component Value Units Date/Time    Urinalysis, Reflex to Urine Culture [[id number]]     Order Status: Sent Lab Status: No result     Specimen: Urine          Medications:  Reconciled Home Medications:      Medication List      START taking these medications    albuterol-ipratropium 2.5 mg-0.5 mg/3 mL nebulizer solution  Commonly known as: DUO-NEB  Take 3 mLs by nebulization every 4 (four) hours as needed for Wheezing or Shortness of Breath. Rescue     aspirin 81 MG EC tablet  Commonly known as: ECOTRIN  Take 1 tablet (81 mg total) by mouth once daily.  Start taking on: March 9, 2023     carvediloL 3.125 MG tablet  Commonly known as: COREG  Take 1 tablet (3.125 mg total) by mouth 2 (two) times daily.     levoFLOXacin 500 MG tablet  Commonly known as: LEVAQUIN  Take 1 tablet (500 mg total) by mouth once daily.     lisinopriL 2.5 MG tablet  Commonly known as: PRINIVIL,ZESTRIL  Take 1 tablet (2.5 mg total) by mouth once daily.     potassium chloride 10 MEQ Cpsr  Commonly known as: MICRO-K  Take 1 capsule (10 mEq total) by mouth once. for 1 dose        CHANGE how you take these medications    furosemide 40 MG tablet  Commonly known as: LASIX  Take 1 tablet (40 mg total) by mouth 2 (two) times daily.  What changed:   · medication strength  · how much to take  · when to take this        CONTINUE taking these medications    * acetaminophen 325 MG tablet  Commonly known as: TYLENOL  Take 2 tablets (650 mg total) by mouth every 4 (four) hours as needed.     * acetaminophen 650 MG Supp  Commonly known as: TYLENOL  Place 1 suppository (650 mg total) rectally every 4 (four) hours as needed (101 OR HEADACHE).     amiodarone 400 MG tablet  Commonly known as: PACERONE  Take 1 tablet (400 mg total) by mouth 3 (three) times daily.     ascorbic acid (vitamin C) 500 MG tablet  Commonly known as: VITAMIN  C  Take 1 tablet (500 mg total) by mouth 2 (two) times daily.     atorvastatin 80 MG tablet  Commonly known as: LIPITOR  Take 1 tablet (80 mg total) by mouth once daily.     budesonide 0.5 mg/2 mL nebulizer solution  Commonly known as: PULMICORT  Take 2 mLs (0.5 mg total) by nebulization every 12 (twelve) hours. Controller     linaCLOtide 290 mcg Cap capsule  Commonly known as: LINZESS  Take 1 capsule (290 mcg total) by mouth before breakfast.     metFORMIN 500 MG tablet  Commonly known as: GLUCOPHAGE  Take 1 tablet (500 mg total) by mouth 2 (two) times daily with meals.     theophylline 400 mg Tb24  Take 1 tablet (400 mg total) by mouth once daily.         * This list has 2 medication(s) that are the same as other medications prescribed for you. Read the directions carefully, and ask your doctor or other care provider to review them with you.                Indwelling Lines/Drains at time of discharge:   Lines/Drains/Airways     Drain  Duration           Female External Urinary Catheter 02/28/23 2130 7 days          Airway  Duration                Airway - Non-Surgical 01/27/23 1230 39 days                Time spent on the discharge of patient: 32 minutes         Charli Monk MD  Department of Hospital Medicine  [facility] - Medical Surgical [facility]

## 2023-03-08 NOTE — PLAN OF CARE
[facility], will need to notify them when d/c'd to set up at home.

## 2023-03-08 NOTE — NURSING
Pt was discharged today and is going home with [facility] health.Notified home health of discharge. Educated the pt via avs in regards to medication changes,follow up appts,and clinical documents. Pt had no further questions at this time. It was a pleasure taking care of the pt.

## 2023-03-08 NOTE — PLAN OF CARE
[facility] and [facility] cannot take pt. Will continue working on agency to see pt.   Referral sent to [facility].

## 2023-03-08 NOTE — ASSESSMENT & PLAN NOTE
Congestive heart failure diastolic dysfunction acute on chronic and present on arrival to emergency room.    Patient will be discharged home on Lasix 40 mg in the morning.    Low-dose of lisinopril and beta blockers and fluid restriction.    Home health to closely monitor patient regarding fluid intake and congestive heart failure exacerbation.

## 2023-03-09 ENCOUNTER — TELEPHONE (OUTPATIENT)
Dept: FAMILY MEDICINE | Facility: CLINIC | Age: 68
End: 2023-03-09
Payer: MEDICARE

## 2023-03-09 ENCOUNTER — PATIENT OUTREACH (OUTPATIENT)
Dept: ADMINISTRATIVE | Facility: CLINIC | Age: 68
End: 2023-03-09
Payer: MEDICARE

## 2023-03-09 DIAGNOSIS — I50.33 CHF (CONGESTIVE HEART FAILURE), NYHA CLASS III, ACUTE ON CHRONIC, DIASTOLIC: ICD-10-CM

## 2023-03-09 DIAGNOSIS — L89.222 PRESSURE INJURY OF LEFT THIGH, STAGE 2: ICD-10-CM

## 2023-03-09 DIAGNOSIS — Z93.0 TRACHEOSTOMY DEPENDENCE: Primary | ICD-10-CM

## 2023-03-09 DIAGNOSIS — L89.150 PRESSURE INJURY OF SACRAL REGION, UNSTAGEABLE: ICD-10-CM

## 2023-03-09 DIAGNOSIS — J44.1 COPD EXACERBATION: ICD-10-CM

## 2023-03-09 NOTE — PROGRESS NOTES
C3 nurse spoke with Jolene, eric for a TCC post hospital discharge follow up call. The patient does not have a scheduled HOSFU appointment with WESLY Best . Stated called office and spoke with PCP this morning.

## 2023-03-09 NOTE — TELEPHONE ENCOUNTER
"I spoke with Chelsea. We compared which meds she didn't have or weren't correct to the hospital records. She also said that Michel isn't able to travel d/t being on "machines". This is Trilogy and she requires frequent suctioning. She said that [facility] would like palliative care at this time. I spoke with Mariajose and she agreed. I called [facility] and spoke with Mariama. She said to fax over the order, recent notes, and demographics to [phone]. Order placed, faxed and scanned to the chart  "

## 2023-03-09 NOTE — TELEPHONE ENCOUNTER
----- Message from Rommel Iqbal sent at 3/9/2023 10:06 AM CST -----  Regarding: Call Back  Please call pt daughter back, she was d/c from [facility] for CO2 poisoning and was given new meds and daughter doesn't know what to give her and when she called [facility] they told her to f/u with her moms pcp.    [phone]

## 2023-03-09 NOTE — PLAN OF CARE
Still no HH accepted pt.  Referrals sent today to Onofre [facility], [facility] and [facility].     MOLECULAR PCR

## 2023-03-21 ENCOUNTER — OFFICE VISIT (OUTPATIENT)
Dept: FAMILY MEDICINE | Facility: CLINIC | Age: 68
End: 2023-03-21
Payer: MEDICARE

## 2023-03-21 DIAGNOSIS — Z99.81 ON HOME OXYGEN THERAPY: ICD-10-CM

## 2023-03-21 DIAGNOSIS — I50.33 CHF (CONGESTIVE HEART FAILURE), NYHA CLASS III, ACUTE ON CHRONIC, DIASTOLIC: Chronic | ICD-10-CM

## 2023-03-21 DIAGNOSIS — J44.9 CHRONIC OBSTRUCTIVE PULMONARY DISEASE, UNSPECIFIED COPD TYPE: Primary | ICD-10-CM

## 2023-03-21 DIAGNOSIS — J96.12 CHRONIC RESPIRATORY FAILURE WITH HYPERCAPNIA: ICD-10-CM

## 2023-03-21 DIAGNOSIS — Z93.0 TRACHEOSTOMY DEPENDENT: Chronic | ICD-10-CM

## 2023-03-21 DIAGNOSIS — I48.0 PAROXYSMAL ATRIAL FIBRILLATION: ICD-10-CM

## 2023-03-21 PROBLEM — I50.9 CONGESTIVE HEART FAILURE: Status: RESOLVED | Noted: 2023-03-21 | Resolved: 2023-03-21

## 2023-03-21 PROBLEM — K21.9 GASTROESOPHAGEAL REFLUX DISEASE: Status: ACTIVE | Noted: 2023-03-21

## 2023-03-21 PROBLEM — J96.01 ACUTE RESPIRATORY FAILURE WITH HYPOXIA AND HYPERCARBIA: Chronic | Status: RESOLVED | Noted: 2023-01-22 | Resolved: 2023-03-21

## 2023-03-21 PROBLEM — M54.50 CHRONIC LOW BACK PAIN: Status: ACTIVE | Noted: 2023-03-21

## 2023-03-21 PROBLEM — I50.9 CONGESTIVE HEART FAILURE: Status: ACTIVE | Noted: 2023-03-21

## 2023-03-21 PROBLEM — E11.9 DIABETES MELLITUS: Status: ACTIVE | Noted: 2023-03-21

## 2023-03-21 PROBLEM — K21.9 LARYNGOPHARYNGEAL REFLUX: Status: ACTIVE | Noted: 2023-03-21

## 2023-03-21 PROBLEM — G89.29 CHRONIC LOW BACK PAIN: Status: ACTIVE | Noted: 2023-03-21

## 2023-03-21 PROBLEM — G47.33 OBSTRUCTIVE SLEEP APNEA SYNDROME: Status: ACTIVE | Noted: 2023-03-21

## 2023-03-21 PROBLEM — K57.90 DIVERTICULAR DISEASE: Status: ACTIVE | Noted: 2023-03-21

## 2023-03-21 PROBLEM — G62.9 PERIPHERAL NERVE DISEASE: Status: ACTIVE | Noted: 2023-03-21

## 2023-03-21 PROBLEM — R63.4 UNINTENDED WEIGHT LOSS: Status: ACTIVE | Noted: 2023-03-21

## 2023-03-21 PROBLEM — I10 HYPERTENSION: Status: ACTIVE | Noted: 2023-03-21

## 2023-03-21 PROBLEM — Z79.01 CHRONIC ANTICOAGULATION: Status: ACTIVE | Noted: 2023-03-21

## 2023-03-21 PROBLEM — E89.0 S/P PARTIAL THYROIDECTOMY: Status: ACTIVE | Noted: 2018-01-23

## 2023-03-21 PROBLEM — J96.02 ACUTE RESPIRATORY FAILURE WITH HYPOXIA AND HYPERCARBIA: Chronic | Status: RESOLVED | Noted: 2023-01-22 | Resolved: 2023-03-21

## 2023-03-21 PROBLEM — G47.00 INSOMNIA: Status: ACTIVE | Noted: 2023-03-21

## 2023-03-21 PROBLEM — K59.09 CHRONIC CONSTIPATION: Status: ACTIVE | Noted: 2023-03-21

## 2023-03-21 PROCEDURE — 1157F ADVNC CARE PLAN IN RCRD: CPT | Mod: 95,,, | Performed by: NURSE PRACTITIONER

## 2023-03-21 PROCEDURE — 4010F ACE/ARB THERAPY RXD/TAKEN: CPT | Mod: 95,,, | Performed by: NURSE PRACTITIONER

## 2023-03-21 PROCEDURE — 99214 PR OFFICE/OUTPT VISIT, EST, LEVL IV, 30-39 MIN: ICD-10-PCS | Mod: 95,,, | Performed by: NURSE PRACTITIONER

## 2023-03-21 PROCEDURE — 1159F MED LIST DOCD IN RCRD: CPT | Mod: 95,,, | Performed by: NURSE PRACTITIONER

## 2023-03-21 PROCEDURE — 4010F PR ACE/ARB THEARPY RXD/TAKEN: ICD-10-PCS | Mod: 95,,, | Performed by: NURSE PRACTITIONER

## 2023-03-21 PROCEDURE — 1159F PR MEDICATION LIST DOCUMENTED IN MEDICAL RECORD: ICD-10-PCS | Mod: 95,,, | Performed by: NURSE PRACTITIONER

## 2023-03-21 PROCEDURE — 1160F PR REVIEW ALL MEDS BY PRESCRIBER/CLIN PHARMACIST DOCUMENTED: ICD-10-PCS | Mod: 95,,, | Performed by: NURSE PRACTITIONER

## 2023-03-21 PROCEDURE — 99214 OFFICE O/P EST MOD 30 MIN: CPT | Mod: 95,,, | Performed by: NURSE PRACTITIONER

## 2023-03-21 PROCEDURE — 1160F RVW MEDS BY RX/DR IN RCRD: CPT | Mod: 95,,, | Performed by: NURSE PRACTITIONER

## 2023-03-21 PROCEDURE — 1157F PR ADVANCE CARE PLAN OR EQUIV PRESENT IN MEDICAL RECORD: ICD-10-PCS | Mod: 95,,, | Performed by: NURSE PRACTITIONER

## 2023-03-21 NOTE — PROGRESS NOTES
Patient ID: Michel De La Paz  : 1955     Chief Complaint: needs trach supplies    Allergies: Patient is allergic to benadryl [diphenhydramine hcl] and darvocet a500 [propoxyphene n-acetaminophen].     This is a telemedicine note. Patient was treated using telemedicine, real time audio and video, according to [facility] protocols. I, Mariajose [facility], conducted the visit from the [facility]. The patient participated in the visit at a non-[facility] location selected by the patient, identified below. I am licensed in the state where the patient stated they are located. The patient stated that they understood and accepted the privacy and security risks to their information at their location. The patient verbally consented to proceed with a telemedicine visit. This visit is not recorded. Patient was located at the patient's home.     Video Time Documentation:  Time of call:   Spent 12:59 minutes with patient face to face discussed health concerns. More than 50% of this time was spent in counseling and coordination of care.    History of Present Illness:  The patient is a 67 y.o. Black or  female who presents to clinic for evaluation and management with a chief complaint of needs trach supplies     Was recently admitted to the hospital with acute respiratory failure.  Discharged with a tracheostomy.  Palliative care was ordered at discharge but services have not yet begun; she is in need of tracheostomy care supplies.  She lives at home with family members who are helping her with ADLs at this time.  States she is been doing well since discharge. She has a Trilogy vent for nighttime. Grandson states that she has not had any dyspnea and her O2 sats have been above 90.     Any medications changes were made during her hospitalization and she is no longer taking her medication for overactive bladder.  She is having an issue with bladder leakage and is wanting to know if she can  resume that medication at this time.  She does not have any dysuria, frequency, no fever, chills, back pain or nausea.       Past Medical History:  has a past medical history of CHF (congestive heart failure), Chronic low back pain, COPD (chronic obstructive pulmonary disease), Diabetes mellitus, type 2, Diverticular disease of large intestine without perforation or abscess, DM (diabetes mellitus), GERD (gastroesophageal reflux disease), Hypertension, Mild intermittent asthma, uncomplicated, Neuropathy, Respiratory failure, and Sleep apnea.    Social History:  reports that she has never smoked. She has never used smokeless tobacco. She reports that she does not drink alcohol and does not use drugs.    Family History: family history includes Aneurysm in her mother; Diabetes type I in her father; Heart failure in her mother; Hypertension in her brother; Kidney disease in her brother and father; Prostate cancer in her father.    Care Team: Patient Care Team:  WESLY Kirby as PCP - General (Family Medicine)  Bandar An MD as Consulting Physician (Urology)  Ajith Kinney MD (Pulmonary Disease)  Tha Ren MD as Consulting Physician (Cardiology)     Current Medications:  Current Outpatient Medications   Medication Instructions    acetaminophen (TYLENOL) 650 mg, Oral, Every 4 hours PRN    acetaminophen (TYLENOL) 650 mg, Rectal, Every 4 hours PRN    albuterol-ipratropium (DUO-NEB) 2.5 mg-0.5 mg/3 mL nebulizer solution 3 mLs, Nebulization, Every 4 hours PRN, Rescue    amiodarone (PACERONE) 400 mg, Oral, 3 times daily    ascorbic acid (vitamin C) (VITAMIN C) 500 mg, Oral, 2 times daily    aspirin (ECOTRIN) 81 mg, Oral, Daily    atorvastatin (LIPITOR) 80 mg, Oral, Daily    budesonide (PULMICORT) 0.5 mg, Nebulization, Every 12 hours, Controller    carvediloL (COREG) 3.125 mg, Oral, 2 times daily    furosemide (LASIX) 40 mg, Oral, Daily    levoFLOXacin (LEVAQUIN) 500 mg, Oral, Daily    linaCLOtide (LINZESS)  290 mcg, Oral, Before breakfast    lisinopriL (PRINIVIL,ZESTRIL) 2.5 mg, Oral, Daily    metFORMIN (GLUCOPHAGE) 500 mg, Oral, 2 times daily with meals    theophylline 400 mg, Oral, Daily       Patient is allergic to benadryl [diphenhydramine hcl] and darvocet a500 [propoxyphene n-acetaminophen].     Review of Systems   Constitutional:  Negative for activity change and unexpected weight change.   HENT:  Negative for hearing loss, rhinorrhea and trouble swallowing.    Eyes:  Negative for discharge and visual disturbance.   Respiratory:  Negative for chest tightness and wheezing.    Cardiovascular:  Positive for palpitations. Negative for chest pain.   Gastrointestinal:  Positive for constipation. Negative for blood in stool, diarrhea and vomiting.   Endocrine: Positive for polyuria. Negative for polydipsia.   Genitourinary:  Negative for difficulty urinating, dysuria, hematuria and menstrual problem.   Musculoskeletal:  Positive for arthralgias. Negative for joint swelling and neck pain.   Neurological:  Positive for weakness. Negative for headaches.   Psychiatric/Behavioral:  Negative for confusion and dysphoric mood.       Physical Exam: LIMITED DUE TO TELEMEDICINE RESTRICTIONS.  General: Well nourished, Alert and oriented, No acute distress.  Respiratory: Non-labored respirations  Musculoskeletal: normal movement of all extremities that are visible in video frame  Integumentary:  No visible suspicious lesions or rashes. No diaphoresis noted.   Psychiatric: Grandson helping with speech-she has not learned to talk with tracheostomy; Euthymic mood, Appropriate affect     Assessment & Plan:  1. Chronic respiratory failure with hypercapnia J96.12  Trilogy Ventilator Qhs  Currently doing neb treatments TID    2. 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    3. On home oxygen therapy        Follow up if symptoms worsen or fail to improve. Call sooner if needed.    Lab Frequency Next  Occurrence   Ambulatory referral/consult to Home Health Once 03/08/2023   Ambulatory referral/consult to HOME Palliative Care Once 03/16/2023        GENARO Best-C

## 2023-03-21 NOTE — PROGRESS NOTES
Patient ID: Michel De La Paz  : 1955    Chief Complaint: needs trach supplies    Allergies: Patient is allergic to benadryl [diphenhydramine hcl] and darvocet a500 [propoxyphene n-acetaminophen].     History of Present Illness:  The patient is a 67 y.o. Black or  female who presents to clinic for follow up on needs trach supplies   HPI   Meds  Furosemide  Metfor  Thomas Spain  Asa  Vit c    Wants bladder medication back, has some at home. 12:59 minutes      Social History:  reports that she has never smoked. She has never used smokeless tobacco. She reports that she does not drink alcohol and does not use drugs.    Past Medical History:  has a past medical history of CHF (congestive heart failure), Chronic low back pain, COPD (chronic obstructive pulmonary disease), Diabetes mellitus, type 2, Diverticular disease of large intestine without perforation or abscess, DM (diabetes mellitus), GERD (gastroesophageal reflux disease), Hypertension, Mild intermittent asthma, uncomplicated, Neuropathy, Respiratory failure, and Sleep apnea.    Current Medications:  Current Outpatient Medications   Medication Instructions    acetaminophen (TYLENOL) 650 mg, Oral, Every 4 hours PRN    acetaminophen (TYLENOL) 650 mg, Rectal, Every 4 hours PRN    albuterol-ipratropium (DUO-NEB) 2.5 mg-0.5 mg/3 mL nebulizer solution 3 mLs, Nebulization, Every 4 hours PRN, Rescue    amiodarone (PACERONE) 400 mg, Oral, 3 times daily    ascorbic acid (vitamin C) (VITAMIN C) 500 mg, Oral, 2 times daily    aspirin (ECOTRIN) 81 mg, Oral, Daily    atorvastatin (LIPITOR) 80 mg, Oral, Daily    budesonide (PULMICORT) 0.5 mg, Nebulization, Every 12 hours, Controller    carvediloL (COREG) 3.125 mg, Oral, 2 times daily    furosemide (LASIX) 40 mg, Oral, Daily    levoFLOXacin (LEVAQUIN) 500 mg, Oral, Daily    linaCLOtide (LINZESS) 290 mcg, Oral, Before breakfast    lisinopriL (PRINIVIL,ZESTRIL) 2.5 mg, Oral, Daily    metFORMIN (GLUCOPHAGE)  500 mg, Oral, 2 times daily with meals    theophylline 400 mg, Oral, Daily       Review of Systems   Constitutional:  Negative for activity change and unexpected weight change.   HENT:  Negative for hearing loss, rhinorrhea and trouble swallowing.    Eyes:  Negative for discharge and visual disturbance.   Respiratory:  Negative for chest tightness and wheezing.    Cardiovascular:  Positive for palpitations. Negative for chest pain.   Gastrointestinal:  Positive for constipation. Negative for blood in stool, diarrhea and vomiting.   Endocrine: Positive for polyuria. Negative for polydipsia.   Genitourinary:  Negative for difficulty urinating, dysuria, hematuria and menstrual problem.   Musculoskeletal:  Positive for arthralgias. Negative for joint swelling and neck pain.   Neurological:  Positive for weakness. Negative for headaches.   Psychiatric/Behavioral:  Negative for confusion and dysphoric mood.       Visit Vitals  LMP  (LMP Unknown)       Physical Exam     Labs Reviewed:  Chemistry:  Lab Results   Component Value Date     03/08/2023    K 4.0 03/08/2023    CHLORIDE 95 (L) 03/08/2023    BUN 10.0 03/08/2023    CREATININE 0.65 03/08/2023    EGFRNORACEVR >60 03/08/2023    GLUCOSE 85 03/08/2023    CALCIUM 9.0 03/08/2023    ALKPHOS 58 03/08/2023    LABPROT 5.8 03/08/2023    ALBUMIN 2.3 (L) 03/08/2023    AST 16 03/08/2023    ALT 9 03/08/2023    MG 1.80 03/06/2023    PHOS 2.8 02/02/2023    TSH 3.15 07/28/2021        Lab Results   Component Value Date    HGBA1C 7.4 (H) 01/17/2022        Hematology:  Lab Results   Component Value Date    WBC 6.1 03/08/2023    RBC 3.69 (L) 03/08/2023    HGB 9.0 (L) 03/08/2023    HCT 31.1 (L) 03/08/2023    MCV 84.3 03/08/2023    MCH 24.4 03/08/2023    MCHC 28.9 (L) 03/08/2023    RDW 21.9 (H) 03/08/2023     03/08/2023    MPV 10.6 (H) 03/08/2023       Lipid Panel:  Lab Results   Component Value Date    CHOL 155 07/28/2021    HDL 71 (H) 07/28/2021    TRIG 74 07/28/2021         Assessment & Plan:  {There are no diagnoses linked to this encounter. (Refresh or delete this SmartLink)}     No future appointments.    No follow-ups on file. Call sooner if needed.    GENARO Best-ANTONIO    Lab Frequency Next Occurrence   Ambulatory referral/consult to Home Health Once 03/08/2023   Ambulatory referral/consult to HOME Palliative Care Once 03/16/2023

## 2023-03-27 DIAGNOSIS — I50.33 CHF (CONGESTIVE HEART FAILURE), NYHA CLASS III, ACUTE ON CHRONIC, DIASTOLIC: Chronic | ICD-10-CM

## 2023-03-27 DIAGNOSIS — K59.09 CHRONIC CONSTIPATION: ICD-10-CM

## 2023-03-27 DIAGNOSIS — I10 HYPERTENSION, UNSPECIFIED TYPE: ICD-10-CM

## 2023-03-27 DIAGNOSIS — E11.9 TYPE 2 DIABETES MELLITUS WITHOUT COMPLICATION, WITHOUT LONG-TERM CURRENT USE OF INSULIN: ICD-10-CM

## 2023-03-27 DIAGNOSIS — I50.33 CHF (CONGESTIVE HEART FAILURE), NYHA CLASS III, ACUTE ON CHRONIC, DIASTOLIC: Primary | Chronic | ICD-10-CM

## 2023-03-27 RX ORDER — LISINOPRIL 2.5 MG/1
2.5 TABLET ORAL DAILY
Qty: 90 TABLET | Refills: 0 | Status: SHIPPED | OUTPATIENT
Start: 2023-03-27 | End: 2023-06-21

## 2023-03-27 RX ORDER — FUROSEMIDE 20 MG/1
20 TABLET ORAL SEE ADMIN INSTRUCTIONS
Qty: 30 TABLET | Refills: 1 | Status: SHIPPED | OUTPATIENT
Start: 2023-03-27 | End: 2023-03-27 | Stop reason: SDUPTHER

## 2023-03-27 RX ORDER — FUROSEMIDE 20 MG/1
20 TABLET ORAL SEE ADMIN INSTRUCTIONS
Qty: 45 TABLET | Refills: 1 | Status: SHIPPED | OUTPATIENT
Start: 2023-03-27

## 2023-03-27 RX ORDER — LINACLOTIDE 145 UG/1
CAPSULE, GELATIN COATED ORAL
Qty: 30 CAPSULE | Refills: 1 | Status: SHIPPED | OUTPATIENT
Start: 2023-03-27 | End: 2023-05-29

## 2023-03-27 RX ORDER — METFORMIN HYDROCHLORIDE 500 MG/1
TABLET ORAL
Qty: 60 TABLET | Refills: 1 | Status: SHIPPED | OUTPATIENT
Start: 2023-03-27 | End: 2023-10-30

## 2023-03-27 NOTE — TELEPHONE ENCOUNTER
----- Message from Tammy Ayala sent at 3/27/2023  9:15 AM CDT -----  Regarding: refills     Linzess 290 mcg oral capsule      Linispriol        metformin 500 mg oral tablet       furosemide 20 mg oral tablet            [facility] in [place]        [phone]

## 2023-04-24 PROBLEM — R65.21 SEVERE SEPSIS WITH SEPTIC SHOCK: Status: RESOLVED | Noted: 2023-01-22 | Resolved: 2023-04-24

## 2023-04-24 PROBLEM — A41.9 SEVERE SEPSIS WITH SEPTIC SHOCK: Status: RESOLVED | Noted: 2023-01-22 | Resolved: 2023-04-24

## 2023-04-27 DIAGNOSIS — E11.9 TYPE 2 DIABETES MELLITUS WITHOUT COMPLICATION, WITHOUT LONG-TERM CURRENT USE OF INSULIN: ICD-10-CM

## 2023-04-27 RX ORDER — METFORMIN HYDROCHLORIDE 500 MG/1
TABLET ORAL
Qty: 180 TABLET | OUTPATIENT
Start: 2023-04-27

## 2023-04-27 RX ORDER — IPRATROPIUM BROMIDE AND ALBUTEROL SULFATE 2.5; .5 MG/3ML; MG/3ML
SOLUTION RESPIRATORY (INHALATION)
OUTPATIENT
Start: 2023-04-27

## 2023-05-16 ENCOUNTER — HOSPITAL ENCOUNTER (OUTPATIENT)
Dept: RADIOLOGY | Facility: HOSPITAL | Age: 68
Discharge: HOME | End: 2023-05-16
Attending: UROLOGY
Payer: MEDICARE

## 2023-05-16 DIAGNOSIS — N20.0 KIDNEY STONE: ICD-10-CM

## 2023-05-16 PROCEDURE — 74018 RADEX ABDOMEN 1 VIEW: CPT | Mod: TC

## 2023-05-17 DIAGNOSIS — N20.0 URIC ACID NEPHROLITHIASIS: Primary | ICD-10-CM

## 2023-05-27 DIAGNOSIS — K59.09 CHRONIC CONSTIPATION: ICD-10-CM

## 2023-05-29 RX ORDER — LINACLOTIDE 145 UG/1
CAPSULE, GELATIN COATED ORAL
Qty: 30 CAPSULE | Refills: 0 | Status: SHIPPED | OUTPATIENT
Start: 2023-05-29 | End: 2023-07-25 | Stop reason: SDUPTHER

## 2023-06-08 PROCEDURE — 85027 COMPLETE CBC AUTOMATED: CPT | Performed by: NURSE PRACTITIONER

## 2023-06-08 PROCEDURE — 83036 HEMOGLOBIN GLYCOSYLATED A1C: CPT | Performed by: NURSE PRACTITIONER

## 2023-06-08 PROCEDURE — 85025 COMPLETE CBC W/AUTO DIFF WBC: CPT | Performed by: NURSE PRACTITIONER

## 2023-06-08 PROCEDURE — 80061 LIPID PANEL: CPT | Performed by: NURSE PRACTITIONER

## 2023-06-08 PROCEDURE — 80053 COMPREHEN METABOLIC PANEL: CPT | Performed by: NURSE PRACTITIONER

## 2023-06-08 PROCEDURE — 84443 ASSAY THYROID STIM HORMONE: CPT | Performed by: NURSE PRACTITIONER

## 2023-06-21 ENCOUNTER — OFFICE VISIT (OUTPATIENT)
Dept: FAMILY MEDICINE | Facility: CLINIC | Age: 68
End: 2023-06-21
Payer: MEDICARE

## 2023-06-21 VITALS
BODY MASS INDEX: 20.66 KG/M2 | DIASTOLIC BLOOD PRESSURE: 62 MMHG | HEART RATE: 76 BPM | WEIGHT: 121 LBS | HEIGHT: 64 IN | SYSTOLIC BLOOD PRESSURE: 100 MMHG | TEMPERATURE: 97 F | OXYGEN SATURATION: 97 %

## 2023-06-21 DIAGNOSIS — E03.8 OTHER SPECIFIED HYPOTHYROIDISM: ICD-10-CM

## 2023-06-21 DIAGNOSIS — J44.9 CHRONIC OBSTRUCTIVE PULMONARY DISEASE, UNSPECIFIED COPD TYPE: ICD-10-CM

## 2023-06-21 DIAGNOSIS — R60.9 DEPENDENT EDEMA: ICD-10-CM

## 2023-06-21 DIAGNOSIS — G89.29 CHRONIC MIDLINE LOW BACK PAIN WITH BILATERAL SCIATICA: Primary | ICD-10-CM

## 2023-06-21 DIAGNOSIS — M54.41 CHRONIC MIDLINE LOW BACK PAIN WITH BILATERAL SCIATICA: Primary | ICD-10-CM

## 2023-06-21 DIAGNOSIS — Z99.81 ON HOME OXYGEN THERAPY: ICD-10-CM

## 2023-06-21 DIAGNOSIS — M54.42 CHRONIC MIDLINE LOW BACK PAIN WITH BILATERAL SCIATICA: Primary | ICD-10-CM

## 2023-06-21 DIAGNOSIS — R79.89 ELEVATED TSH: ICD-10-CM

## 2023-06-21 LAB
T4 FREE SERPL-MCNC: 0.71 NG/DL (ref 0.78–2.19)
TSH SERPL-ACNC: 7.12 UIU/ML (ref 0.36–3.74)

## 2023-06-21 PROCEDURE — 99214 OFFICE O/P EST MOD 30 MIN: CPT | Mod: ,,, | Performed by: NURSE PRACTITIONER

## 2023-06-21 PROCEDURE — 1159F PR MEDICATION LIST DOCUMENTED IN MEDICAL RECORD: ICD-10-PCS | Mod: ,,, | Performed by: NURSE PRACTITIONER

## 2023-06-21 PROCEDURE — 1101F PT FALLS ASSESS-DOCD LE1/YR: CPT | Mod: ,,, | Performed by: NURSE PRACTITIONER

## 2023-06-21 PROCEDURE — 3288F FALL RISK ASSESSMENT DOCD: CPT | Mod: ,,, | Performed by: NURSE PRACTITIONER

## 2023-06-21 PROCEDURE — 3288F PR FALLS RISK ASSESSMENT DOCUMENTED: ICD-10-PCS | Mod: ,,, | Performed by: NURSE PRACTITIONER

## 2023-06-21 PROCEDURE — 99214 PR OFFICE/OUTPT VISIT, EST, LEVL IV, 30-39 MIN: ICD-10-PCS | Mod: ,,, | Performed by: NURSE PRACTITIONER

## 2023-06-21 PROCEDURE — 4010F ACE/ARB THERAPY RXD/TAKEN: CPT | Mod: ,,, | Performed by: NURSE PRACTITIONER

## 2023-06-21 PROCEDURE — 3074F SYST BP LT 130 MM HG: CPT | Mod: ,,, | Performed by: NURSE PRACTITIONER

## 2023-06-21 PROCEDURE — 3008F PR BODY MASS INDEX (BMI) DOCUMENTED: ICD-10-PCS | Mod: ,,, | Performed by: NURSE PRACTITIONER

## 2023-06-21 PROCEDURE — 84439 ASSAY OF FREE THYROXINE: CPT | Performed by: NURSE PRACTITIONER

## 2023-06-21 PROCEDURE — 1101F PR PT FALLS ASSESS DOC 0-1 FALLS W/OUT INJ PAST YR: ICD-10-PCS | Mod: ,,, | Performed by: NURSE PRACTITIONER

## 2023-06-21 PROCEDURE — 1159F MED LIST DOCD IN RCRD: CPT | Mod: ,,, | Performed by: NURSE PRACTITIONER

## 2023-06-21 PROCEDURE — 1157F ADVNC CARE PLAN IN RCRD: CPT | Mod: ,,, | Performed by: NURSE PRACTITIONER

## 2023-06-21 PROCEDURE — 3074F PR MOST RECENT SYSTOLIC BLOOD PRESSURE < 130 MM HG: ICD-10-PCS | Mod: ,,, | Performed by: NURSE PRACTITIONER

## 2023-06-21 PROCEDURE — 84443 ASSAY THYROID STIM HORMONE: CPT | Performed by: NURSE PRACTITIONER

## 2023-06-21 PROCEDURE — 1157F PR ADVANCE CARE PLAN OR EQUIV PRESENT IN MEDICAL RECORD: ICD-10-PCS | Mod: ,,, | Performed by: NURSE PRACTITIONER

## 2023-06-21 PROCEDURE — 3078F DIAST BP <80 MM HG: CPT | Mod: ,,, | Performed by: NURSE PRACTITIONER

## 2023-06-21 PROCEDURE — 1160F PR REVIEW ALL MEDS BY PRESCRIBER/CLIN PHARMACIST DOCUMENTED: ICD-10-PCS | Mod: ,,, | Performed by: NURSE PRACTITIONER

## 2023-06-21 PROCEDURE — 3008F BODY MASS INDEX DOCD: CPT | Mod: ,,, | Performed by: NURSE PRACTITIONER

## 2023-06-21 PROCEDURE — 1160F RVW MEDS BY RX/DR IN RCRD: CPT | Mod: ,,, | Performed by: NURSE PRACTITIONER

## 2023-06-21 PROCEDURE — 4010F PR ACE/ARB THEARPY RXD/TAKEN: ICD-10-PCS | Mod: ,,, | Performed by: NURSE PRACTITIONER

## 2023-06-21 PROCEDURE — 3078F PR MOST RECENT DIASTOLIC BLOOD PRESSURE < 80 MM HG: ICD-10-PCS | Mod: ,,, | Performed by: NURSE PRACTITIONER

## 2023-06-21 RX ORDER — DULOXETIN HYDROCHLORIDE 30 MG/1
30 CAPSULE, DELAYED RELEASE ORAL DAILY
Qty: 30 CAPSULE | Refills: 11 | Status: SHIPPED | OUTPATIENT
Start: 2023-06-21 | End: 2023-11-08 | Stop reason: SDUPTHER

## 2023-06-21 RX ORDER — OXYBUTYNIN CHLORIDE 5 MG/1
5 TABLET, EXTENDED RELEASE ORAL DAILY
COMMUNITY
Start: 2023-06-02 | End: 2023-08-23 | Stop reason: SDUPTHER

## 2023-06-21 RX ORDER — DEXLANSOPRAZOLE 30 MG/1
30 CAPSULE, DELAYED RELEASE ORAL DAILY
COMMUNITY
Start: 2023-05-09 | End: 2024-02-21

## 2023-06-21 RX ORDER — FLUTICASONE FUROATE, UMECLIDINIUM BROMIDE AND VILANTEROL TRIFENATATE 100; 62.5; 25 UG/1; UG/1; UG/1
1 POWDER RESPIRATORY (INHALATION) DAILY
COMMUNITY
Start: 2023-06-02

## 2023-06-21 RX ORDER — ALBUTEROL SULFATE 90 UG/1
1 AEROSOL, METERED RESPIRATORY (INHALATION)
COMMUNITY
Start: 2023-06-02 | End: 2023-07-25

## 2023-06-21 RX ORDER — DICLOFENAC SODIUM 10 MG/G
2 GEL TOPICAL DAILY
Qty: 100 G | Refills: 5 | Status: SHIPPED | OUTPATIENT
Start: 2023-06-21

## 2023-06-21 RX ORDER — ROSUVASTATIN CALCIUM 20 MG/1
20 TABLET, COATED ORAL DAILY
Qty: 90 TABLET | Refills: 3 | Status: SHIPPED | OUTPATIENT
Start: 2023-06-21 | End: 2023-06-28

## 2023-06-21 NOTE — PROGRESS NOTES
Patient ID: Michel De La Paz  : 1955    Chief Complaint: Leg Swelling and Foot Swelling    Allergies: Patient is allergic to benadryl [diphenhydramine hcl] and darvocet a500 [propoxyphene n-acetaminophen].     History of Present Illness:  The patient is a 67 y.o. Black or  female who presents to clinic for follow up on Leg Swelling and Foot Swelling     She is not been seen here in some time.  Was recently placed on palliative care as she has end-stage lung disease.  She tells me that she has been doing well in general.  Trach was removed.  She has increased appetite and has been eating well.    She has CHF, established with Cardiology, is taking her Lasix daily, 1.5 pills daily. She remains on a very low dose of Coreg and was previously on low dose Lisinopril but was taken off the ACE at last visit with Dr Ren.  Legs swelling but no change in breathing; denies chest pain, orthopnea or PND. Sits in chair with legs in dependant position all day. Swelling is not present when she wakes in the morning.     She has chronic lower back pain with radiation all the way to her feet bilaterally. This has not changed at all.     Recent labs revealed an elevated TSH.  This was repeated and it was again elevated, free T4 was low.    Social History:  reports that she has never smoked. She has never used smokeless tobacco. She reports that she does not drink alcohol and does not use drugs.    Past Medical History:  has a past medical history of CHF (congestive heart failure), Chronic low back pain, COPD (chronic obstructive pulmonary disease), Diabetes mellitus, type 2, Diverticular disease of large intestine without perforation or abscess, DM (diabetes mellitus), GERD (gastroesophageal reflux disease), Hypertension, Mild intermittent asthma, uncomplicated, Neuropathy, Respiratory failure, Sleep apnea, and Tracheostomy dependent.    Current Medications:  Current Outpatient Medications   Medication  "Instructions    albuterol (PROVENTIL/VENTOLIN HFA) 90 mcg/actuation inhaler 1 puff, Inhalation, As needed (PRN)    albuterol-ipratropium (DUO-NEB) 2.5 mg-0.5 mg/3 mL nebulizer solution 3 mLs, Nebulization, Every 4 hours PRN, Rescue    ascorbic acid (vitamin C) (VITAMIN C) 500 mg, Oral, 2 times daily    aspirin (ECOTRIN) 81 mg, Oral, Daily    budesonide (PULMICORT) 0.5 mg, Nebulization, Every 12 hours, Controller    carvediloL (COREG) 3.125 mg, Oral, 2 times daily    dexlansoprazole (DEXILANT) 30 mg, Oral, Daily    diclofenac sodium (VOLTAREN) 2 g, Topical (Top), Daily    DULoxetine (CYMBALTA) 30 mg, Oral, Daily    furosemide (LASIX) 20 mg, Oral, See admin instructions, Take    levothyroxine (SYNTHROID) 50 mcg, Oral, Before breakfast    linaCLOtide (LINZESS) 145 mcg Cap capsule TAKE ONE CAPSULE BY MOUTH EVERY DAY    metFORMIN (GLUCOPHAGE) 500 MG tablet TAKE ONE TABLET BY MOUTH TWICE A DAY    oxybutynin (DITROPAN-XL) 5 mg, Oral, Daily    rosuvastatin (CRESTOR) 20 mg, Oral, Daily    theophylline 400 mg, Oral, Daily    TRELEGY ELLIPTA 100-62.5-25 mcg DsDv 1 puff, Inhalation, Daily       Review of Systems   See HPI    Visit Vitals  /62   Pulse 76   Temp 97 °F (36.1 °C) (Temporal)   Ht 5' 4" (1.626 m)   Wt 54.9 kg (121 lb)   LMP  (LMP Unknown)   SpO2 97%   BMI 20.77 kg/m²       Physical Exam  Vitals reviewed.   Constitutional:       Appearance: Normal appearance.   Cardiovascular:      Heart sounds: Normal heart sounds.   Pulmonary:      Breath sounds: Normal breath sounds.   Musculoskeletal:      Lumbar back: Bony tenderness present.      Right lower leg: Edema (1+) present.      Left lower leg: Edema (1+) present.   Skin:     General: Skin is warm and dry.   Neurological:      Mental Status: She is oriented to person, place, and time.          Labs Reviewed:  Chemistry:  Lab Results   Component Value Date     06/08/2023    K 4.5 06/08/2023    CHLORIDE 96 (L) 06/08/2023    BUN 16.0 06/08/2023    CREATININE " 0.71 06/08/2023    EGFRNORACEVR >90 06/08/2023    GLUCOSE 104 06/08/2023    CALCIUM 9.5 06/08/2023    ALKPHOS 60 06/08/2023    LABPROT 6.8 06/08/2023    ALBUMIN 4.0 06/08/2023    AST 23 06/08/2023    ALT 10 06/08/2023    MG 1.80 03/06/2023    PHOS 2.8 02/02/2023    TSH 7.120 (H) 06/21/2023    [id number] 0.71 (L) 06/21/2023        Lab Results   Component Value Date    HGBA1C 5.5 06/08/2023        Hematology:  Lab Results   Component Value Date    WBC 3.86 (L) 06/08/2023    RBC 3.62 (L) 06/08/2023    HGB 10.2 (L) 06/08/2023    HCT 31.7 (L) 06/08/2023    MCV 87.6 06/08/2023    MCH 28.2 06/08/2023    MCHC 32.2 06/08/2023    RDW 14.0 06/08/2023     06/08/2023    MPV 11.6 06/08/2023       Lipid Panel:  Lab Results   Component Value Date    CHOL 302 (H) 06/08/2023    HDL 97 (H) 06/08/2023    DLDL 157.2 (H) 06/08/2023    TRIG 90 06/08/2023        Assessment & Plan:  1. Chronic midline low back pain with bilateral sciatica  -     diclofenac sodium (VOLTAREN) 1 % Gel; Apply 2 g topically once daily.  Dispense: 100 g; Refill: 5  -     DULoxetine (CYMBALTA) 30 MG capsule; Take 1 capsule (30 mg total) by mouth once daily.  Dispense: 30 capsule; Refill: 11    2. Dependent edema  Increase Lasix to 2 tabs daily x 2-3 days. Elevate feet when sitting.     3. Chronic obstructive pulmonary disease, unspecified COPD type  Stable on current medications.    4. On home oxygen therapy    5. Elevated TSH  -     TSH; Future; Expected date: 06/21/2023  -     T4, Free; Future; Expected date: 06/21/2023  TSH still repeated on repeat and Free T4 was low. Will need medication.     -     levothyroxine (SYNTHROID) 50 MCG tablet; Take 1 tablet (50 mcg total) by mouth before breakfast.  Dispense: 30 tablet; Refill: 11         Future Appointments   Date Time Provider Department Center   8/23/2023  2:30 PM [facility]   5/17/2024  9:30 AM [address] [place]       No follow-ups on file. Call sooner if  needed.    GENARO Best-C    Lab Frequency Next Occurrence   Ambulatory referral/consult to Home Health Once 03/08/2023   Ambulatory referral/consult to HOME Palliative Care Once 03/16/2023   US Retroperitoneal Complete Once 05/17/2023

## 2023-06-22 RX ORDER — LEVOTHYROXINE SODIUM 50 UG/1
50 TABLET ORAL
Qty: 30 TABLET | Refills: 11 | Status: SHIPPED | OUTPATIENT
Start: 2023-06-22 | End: 2023-11-08 | Stop reason: SDUPTHER

## 2023-06-26 PROBLEM — J96.12 CHRONIC RESPIRATORY FAILURE WITH HYPERCAPNIA: Status: RESOLVED | Noted: 2023-03-21 | Resolved: 2023-06-26

## 2023-06-28 PROBLEM — Z93.0 TRACHEOSTOMY DEPENDENT: Chronic | Status: RESOLVED | Noted: 2023-03-07 | Resolved: 2023-06-28

## 2023-06-28 PROBLEM — E03.8 TSH (THYROID-STIMULATING HORMONE DEFICIENCY): Status: ACTIVE | Noted: 2023-06-28

## 2023-06-28 PROBLEM — R63.4 UNINTENDED WEIGHT LOSS: Status: RESOLVED | Noted: 2023-03-21 | Resolved: 2023-06-28

## 2023-06-28 RX ORDER — ROSUVASTATIN CALCIUM 20 MG/1
20 TABLET, COATED ORAL DAILY
Qty: 90 TABLET | Refills: 3 | Status: SHIPPED | OUTPATIENT
Start: 2023-06-28 | End: 2024-03-27 | Stop reason: SDUPTHER

## 2023-07-24 DIAGNOSIS — J30.2 SEASONAL ALLERGIES: ICD-10-CM

## 2023-07-24 DIAGNOSIS — K59.09 CHRONIC CONSTIPATION: Primary | ICD-10-CM

## 2023-07-25 DIAGNOSIS — J44.9 CHRONIC OBSTRUCTIVE PULMONARY DISEASE, UNSPECIFIED COPD TYPE: Primary | ICD-10-CM

## 2023-07-25 RX ORDER — LORATADINE 10 MG/1
TABLET ORAL
Qty: 90 TABLET | Refills: 3 | Status: SHIPPED | OUTPATIENT
Start: 2023-07-25

## 2023-07-25 RX ORDER — LINACLOTIDE 290 UG/1
CAPSULE, GELATIN COATED ORAL
Qty: 90 CAPSULE | Refills: 3 | Status: SHIPPED | OUTPATIENT
Start: 2023-07-25 | End: 2023-11-08 | Stop reason: SDUPTHER

## 2023-07-25 RX ORDER — ALBUTEROL SULFATE 90 UG/1
AEROSOL, METERED RESPIRATORY (INHALATION)
Qty: 18 G | Refills: 11 | Status: SHIPPED | OUTPATIENT
Start: 2023-07-25 | End: 2023-11-08 | Stop reason: SDUPTHER

## 2023-08-01 DIAGNOSIS — Z12.31 ENCOUNTER FOR SCREENING MAMMOGRAM FOR MALIGNANT NEOPLASM OF BREAST: Primary | ICD-10-CM

## 2023-08-07 ENCOUNTER — LAB VISIT (OUTPATIENT)
Dept: LAB | Facility: HOSPITAL | Age: 68
End: 2023-08-07
Attending: SURGERY
Payer: MEDICARE

## 2023-08-07 DIAGNOSIS — Z12.11 SPECIAL SCREENING FOR MALIGNANT NEOPLASMS, COLON: Primary | ICD-10-CM

## 2023-08-07 LAB
HEMOCCULT SP1 STL QL: POSITIVE
HEMOCCULT SP2 STL QL: POSITIVE
HEMOCCULT SP3 STL QL: POSITIVE

## 2023-08-07 PROCEDURE — 82270 OCCULT BLOOD FECES: CPT

## 2023-08-18 ENCOUNTER — HOSPITAL ENCOUNTER (OUTPATIENT)
Dept: RADIOLOGY | Facility: HOSPITAL | Age: 68
Discharge: HOME | End: 2023-08-18
Attending: SURGERY
Payer: MEDICARE

## 2023-08-18 DIAGNOSIS — Z12.31 ENCOUNTER FOR SCREENING MAMMOGRAM FOR MALIGNANT NEOPLASM OF BREAST: ICD-10-CM

## 2023-08-18 PROCEDURE — 77067 SCR MAMMO BI INCL CAD: CPT | Mod: TC

## 2023-08-18 PROCEDURE — 77063 MAMMO DIGITAL SCREENING BILAT WITH TOMO: ICD-10-PCS | Mod: 26,,, | Performed by: STUDENT IN AN ORGANIZED HEALTH CARE EDUCATION/TRAINING PROGRAM

## 2023-08-18 PROCEDURE — 77067 SCR MAMMO BI INCL CAD: CPT | Mod: 26,,, | Performed by: STUDENT IN AN ORGANIZED HEALTH CARE EDUCATION/TRAINING PROGRAM

## 2023-08-18 PROCEDURE — 77063 BREAST TOMOSYNTHESIS BI: CPT | Mod: 26,,, | Performed by: STUDENT IN AN ORGANIZED HEALTH CARE EDUCATION/TRAINING PROGRAM

## 2023-08-18 PROCEDURE — 77067 MAMMO DIGITAL SCREENING BILAT WITH TOMO: ICD-10-PCS | Mod: 26,,, | Performed by: STUDENT IN AN ORGANIZED HEALTH CARE EDUCATION/TRAINING PROGRAM

## 2023-08-23 ENCOUNTER — OFFICE VISIT (OUTPATIENT)
Dept: FAMILY MEDICINE | Facility: CLINIC | Age: 68
End: 2023-08-23
Payer: MEDICARE

## 2023-08-23 VITALS
DIASTOLIC BLOOD PRESSURE: 68 MMHG | HEIGHT: 64 IN | SYSTOLIC BLOOD PRESSURE: 96 MMHG | OXYGEN SATURATION: 96 % | WEIGHT: 117 LBS | HEART RATE: 82 BPM | BODY MASS INDEX: 19.97 KG/M2 | TEMPERATURE: 98 F

## 2023-08-23 DIAGNOSIS — E03.9 ACQUIRED HYPOTHYROIDISM: ICD-10-CM

## 2023-08-23 DIAGNOSIS — N32.81 OAB (OVERACTIVE BLADDER): ICD-10-CM

## 2023-08-23 DIAGNOSIS — Z99.81 ON HOME OXYGEN THERAPY: ICD-10-CM

## 2023-08-23 DIAGNOSIS — K21.9 GASTROESOPHAGEAL REFLUX DISEASE WITHOUT ESOPHAGITIS: ICD-10-CM

## 2023-08-23 DIAGNOSIS — E11.42 TYPE 2 DIABETES MELLITUS WITH DIABETIC POLYNEUROPATHY, WITHOUT LONG-TERM CURRENT USE OF INSULIN: ICD-10-CM

## 2023-08-23 DIAGNOSIS — I50.33 CHF (CONGESTIVE HEART FAILURE), NYHA CLASS III, ACUTE ON CHRONIC, DIASTOLIC: Chronic | ICD-10-CM

## 2023-08-23 DIAGNOSIS — D50.8 IRON DEFICIENCY ANEMIA SECONDARY TO INADEQUATE DIETARY IRON INTAKE: ICD-10-CM

## 2023-08-23 DIAGNOSIS — E78.2 MIXED HYPERLIPIDEMIA: ICD-10-CM

## 2023-08-23 DIAGNOSIS — I10 PRIMARY HYPERTENSION: ICD-10-CM

## 2023-08-23 DIAGNOSIS — Z00.00 MEDICARE ANNUAL WELLNESS VISIT, SUBSEQUENT: Primary | ICD-10-CM

## 2023-08-23 DIAGNOSIS — J43.9 PULMONARY EMPHYSEMA, UNSPECIFIED EMPHYSEMA TYPE: ICD-10-CM

## 2023-08-23 PROCEDURE — 3044F PR MOST RECENT HEMOGLOBIN A1C LEVEL <7.0%: ICD-10-PCS | Mod: ,,, | Performed by: NURSE PRACTITIONER

## 2023-08-23 PROCEDURE — 3044F HG A1C LEVEL LT 7.0%: CPT | Mod: ,,, | Performed by: NURSE PRACTITIONER

## 2023-08-23 PROCEDURE — 1160F PR REVIEW ALL MEDS BY PRESCRIBER/CLIN PHARMACIST DOCUMENTED: ICD-10-PCS | Mod: ,,, | Performed by: NURSE PRACTITIONER

## 2023-08-23 PROCEDURE — 1160F RVW MEDS BY RX/DR IN RCRD: CPT | Mod: ,,, | Performed by: NURSE PRACTITIONER

## 2023-08-23 PROCEDURE — 3074F PR MOST RECENT SYSTOLIC BLOOD PRESSURE < 130 MM HG: ICD-10-PCS | Mod: ,,, | Performed by: NURSE PRACTITIONER

## 2023-08-23 PROCEDURE — 1157F PR ADVANCE CARE PLAN OR EQUIV PRESENT IN MEDICAL RECORD: ICD-10-PCS | Mod: ,,, | Performed by: NURSE PRACTITIONER

## 2023-08-23 PROCEDURE — 3288F FALL RISK ASSESSMENT DOCD: CPT | Mod: ,,, | Performed by: NURSE PRACTITIONER

## 2023-08-23 PROCEDURE — 1157F ADVNC CARE PLAN IN RCRD: CPT | Mod: ,,, | Performed by: NURSE PRACTITIONER

## 2023-08-23 PROCEDURE — 3078F PR MOST RECENT DIASTOLIC BLOOD PRESSURE < 80 MM HG: ICD-10-PCS | Mod: ,,, | Performed by: NURSE PRACTITIONER

## 2023-08-23 PROCEDURE — 3078F DIAST BP <80 MM HG: CPT | Mod: ,,, | Performed by: NURSE PRACTITIONER

## 2023-08-23 PROCEDURE — G0439 PR MEDICARE ANNUAL WELLNESS SUBSEQUENT VISIT: ICD-10-PCS | Mod: ,,, | Performed by: NURSE PRACTITIONER

## 2023-08-23 PROCEDURE — 1101F PR PT FALLS ASSESS DOC 0-1 FALLS W/OUT INJ PAST YR: ICD-10-PCS | Mod: ,,, | Performed by: NURSE PRACTITIONER

## 2023-08-23 PROCEDURE — 3288F PR FALLS RISK ASSESSMENT DOCUMENTED: ICD-10-PCS | Mod: ,,, | Performed by: NURSE PRACTITIONER

## 2023-08-23 PROCEDURE — 3008F BODY MASS INDEX DOCD: CPT | Mod: ,,, | Performed by: NURSE PRACTITIONER

## 2023-08-23 PROCEDURE — 3008F PR BODY MASS INDEX (BMI) DOCUMENTED: ICD-10-PCS | Mod: ,,, | Performed by: NURSE PRACTITIONER

## 2023-08-23 PROCEDURE — 1101F PT FALLS ASSESS-DOCD LE1/YR: CPT | Mod: ,,, | Performed by: NURSE PRACTITIONER

## 2023-08-23 PROCEDURE — 1159F PR MEDICATION LIST DOCUMENTED IN MEDICAL RECORD: ICD-10-PCS | Mod: ,,, | Performed by: NURSE PRACTITIONER

## 2023-08-23 PROCEDURE — 3074F SYST BP LT 130 MM HG: CPT | Mod: ,,, | Performed by: NURSE PRACTITIONER

## 2023-08-23 PROCEDURE — 1125F PR PAIN SEVERITY QUANTIFIED, PAIN PRESENT: ICD-10-PCS | Mod: ,,, | Performed by: NURSE PRACTITIONER

## 2023-08-23 PROCEDURE — 1125F AMNT PAIN NOTED PAIN PRSNT: CPT | Mod: ,,, | Performed by: NURSE PRACTITIONER

## 2023-08-23 PROCEDURE — 4010F ACE/ARB THERAPY RXD/TAKEN: CPT | Mod: ,,, | Performed by: NURSE PRACTITIONER

## 2023-08-23 PROCEDURE — G0439 PPPS, SUBSEQ VISIT: HCPCS | Mod: ,,, | Performed by: NURSE PRACTITIONER

## 2023-08-23 PROCEDURE — 4010F PR ACE/ARB THEARPY RXD/TAKEN: ICD-10-PCS | Mod: ,,, | Performed by: NURSE PRACTITIONER

## 2023-08-23 PROCEDURE — 1159F MED LIST DOCD IN RCRD: CPT | Mod: ,,, | Performed by: NURSE PRACTITIONER

## 2023-08-23 RX ORDER — THEOPHYLLINE 400 MG/1
400 TABLET, EXTENDED RELEASE ORAL DAILY
Qty: 90 TABLET | Refills: 3 | Status: SHIPPED | OUTPATIENT
Start: 2023-08-23 | End: 2023-08-23 | Stop reason: SDUPTHER

## 2023-08-23 RX ORDER — THEOPHYLLINE 400 MG/1
400 TABLET, EXTENDED RELEASE ORAL DAILY
Qty: 90 TABLET | Refills: 3 | Status: SHIPPED | OUTPATIENT
Start: 2023-08-23 | End: 2024-03-27 | Stop reason: ALTCHOICE

## 2023-08-23 RX ORDER — CARVEDILOL 3.12 MG/1
3.12 TABLET ORAL 2 TIMES DAILY
Qty: 180 TABLET | Refills: 3 | Status: SHIPPED | OUTPATIENT
Start: 2023-08-23 | End: 2024-08-22

## 2023-08-23 RX ORDER — OXYBUTYNIN CHLORIDE 5 MG/1
5 TABLET, EXTENDED RELEASE ORAL DAILY
Qty: 90 TABLET | Refills: 3 | Status: SHIPPED | OUTPATIENT
Start: 2023-08-23 | End: 2023-11-08 | Stop reason: SDUPTHER

## 2023-08-23 NOTE — PROGRESS NOTES
Patient ID: Michel De La Paz  : 1955    Chief Complaint: Medicare AWV      History of Present Illness:  The patient is a 68 y.o. Black or  female who presents to clinic for annual wellness visit.      Here for annual wellness.  She is doing well at this time.  She lives at home with her grandson's who help with the shopping, cooking, and some ADLs.  She was previously placed on palliative care as she has end-stage lung disease.  Her appetite has actually increased, she is eating much better and has actually gained a couple of pounds.     Recently diagnosed with hypothyroidism.  She tells me that she quit taking her thyroid medication as she did not think she really needed it.    She has also has not been taking her cholesterol medication routinely and her cholesterol is currently elevated. It was previously very well controlled.          Allergies: Patient is allergic to benadryl [diphenhydramine hcl] and darvocet a500 [propoxyphene n-acetaminophen].     Past Medical History:  has a past medical history of CHF (congestive heart failure), Chronic low back pain, COPD (chronic obstructive pulmonary disease), Diabetes mellitus, type 2, Diverticular disease of large intestine without perforation or abscess, DM (diabetes mellitus), GERD (gastroesophageal reflux disease), Hypertension, Mild intermittent asthma, uncomplicated, Neuropathy, Respiratory failure, Sleep apnea, and Tracheostomy dependent.    Surgical History:  has a past surgical history that includes Cataract extraction; Back surgery; Hysterectomy; Arthroscopy of knee; LEFT LUNG; Breast surgery; Colonoscopy (2020); Microlaryngoscopy with biopsy of vocal cord (); Carpal tunnel release; Tracheostomy tube placement; and Removal of tracheostomy tube.    Family History: family history includes Aneurysm in her mother; Diabetes type I in her father; Heart failure in her mother; Hypertension in her brother; Kidney disease in her brother  and father; Prostate cancer in her father.    Social History:  reports that she has never smoked. She has never used smokeless tobacco. She reports that she does not drink alcohol and does not use drugs.    Care Team: Patient Care Team:  Mariajose Hutchinson FNP-C as PCP - General (Family Medicine)  Bandar An MD as Consulting Physician (Urology)  Ajith Kinney MD (Pulmonary Disease)  Tha Ren MD as Consulting Physician (Cardiology)  Simon Summers DPM as Consulting Physician (Podiatry)     Current Medications:  Current Outpatient Medications   Medication Instructions    albuterol (PROVENTIL/VENTOLIN HFA) 90 mcg/actuation inhaler INHALE 2 PUFFS FOUR TIMES DAILY AS NEEDED FOR WHEEZING    albuterol-ipratropium (DUO-NEB) 2.5 mg-0.5 mg/3 mL nebulizer solution 3 mLs, Nebulization, Every 4 hours PRN, Rescue    ascorbic acid (vitamin C) (VITAMIN C) 500 mg, Oral, 2 times daily    aspirin (ECOTRIN) 81 mg, Oral, Daily    budesonide (PULMICORT) 0.5 mg, Nebulization, Every 12 hours, Controller    carvediloL (COREG) 3.125 mg, Oral, 2 times daily    dexlansoprazole (DEXILANT) 30 mg, Oral, Daily    diclofenac sodium (VOLTAREN) 2 g, Topical (Top), Daily    DULoxetine (CYMBALTA) 30 mg, Oral, Daily    furosemide (LASIX) 20 mg, Oral, See admin instructions, Take    levothyroxine (SYNTHROID) 50 mcg, Oral, Before breakfast    linaCLOtide (LINZESS) 290 mcg Cap capsule TAKE 1 CAPSULE EVERY DAY    loratadine (CLARITIN) 10 mg tablet TAKE 1 TABLET EVERY DAY    metFORMIN (GLUCOPHAGE) 500 MG tablet TAKE ONE TABLET BY MOUTH TWICE A DAY    oxybutynin (DITROPAN-XL) 5 mg, Oral, Daily    rosuvastatin (CRESTOR) 20 mg, Oral, Daily    theophylline 400 mg, Oral, Daily    TRELEGY ELLIPTA 100-62.5-25 mcg DsDv 1 puff, Inhalation, Daily       Opioid Screening: Patient medication list reviewed, patient is not taking prescription opioids. Patient is not using additional opioids than prescribed. Patient is at low risk of substance abuse  based on this opioid use history.     Patient Reported Health Risk Assessment  What [age] your age?: 65-69  Are you male or female?: Female  During the past four weeks, how much have you been bothered by emotional problems such as feeling anxious, depressed, irritable, sad, or downhearted and blue?: Not at all  During the past five weeks, has your physical and/or emotional health limited your social activities with family, friends, neighbors, or groups?: Not at all  During the past four weeks, how much bodily pain have you generally had?: Very mild pain  During the past four weeks, was someone available to help if you needed and wanted help?: Yes, as much as I wanted  During the past four weeks, what was the hardest physical activity you could do for at least two minutes?: Light  Can you get to places out of walking distance without help?  (For example, can you travel alone on buses or taxis, or drive your own car?): Yes  Can you go shopping for groceries or clothes without someone's help?: Yes  Can you prepare your own meals?: Yes  Can you do your own housework without help?: Yes  Because of any health problems, do you need the help of another person with your personal care needs such as eating, bathing, dressing, or getting around the house?: No  Can you handle your own money without help?: Yes  During the past four weeks, how would you rate your health in general?: Good  How have things been going for you during the past four weeks?: Good and bad parts about equal  Are you having difficulties driving your car?: No  Do you always fasten your seat belt when you are in a car?: Yes, usually  How often in the past four weeks have you been bothered by falling or dizzy when standing up?: Never  How often in the past four weeks have you been bothered by sexual problems?: Never  How often in the past four weeks have you been bothered by trouble eating well?: Never  How often in the past four weeks have you been bothered by  teeth or denture problems?: Never  How often in the past four weeks have you been bothered with problems using the telephone?: Never  How often in the past four weeks have you been bothered by tiredness or fatigue?: Always  Have you fallen two or more times in the past year?: Yes  Are you afraid of falling?: Yes  Are you a smoker?: No  During the past four weeks, how many drinks of wine, beer, or other alcoholic beverages did you have?: No alcohol at all  Do you exercise for about 20 minutes three or more days a week?: Yes, some of the time  Have you been given any information to help you with hazards in your house that might hurt you?: Yes  Have you been given any information to help you with keeping track of your medications?: Yes  How often do you have trouble taking medicines the way you've been told to take them?: I always take them as prescribed  How confident are you that you can control and manage most of your health problems?: Very confident  What is your race? (Check all that apply.):     Review of Systems   Constitutional:  Negative for activity change, appetite change, fatigue and unexpected weight change.   HENT:  Negative for ear pain and hearing loss.    Eyes:  Negative for visual disturbance.   Respiratory:  Negative for apnea, cough, chest tightness, shortness of breath and wheezing.    Cardiovascular:  Negative for chest pain, palpitations, leg swelling and claudication.   Gastrointestinal:  Negative for abdominal pain, anal bleeding, blood in stool, change in bowel habit, nausea, vomiting and reflux.   Endocrine: Negative for cold intolerance, heat intolerance, polydipsia, polyphagia and polyuria.   Genitourinary:  Negative for dysuria, frequency, hematuria and urgency.   Musculoskeletal:  Negative for arthralgias.   Integumentary:  Negative for rash and mole/lesion.   Allergic/Immunologic: Negative for environmental allergies.   Neurological:  Negative for dizziness, headaches and  "memory loss.        Visit Vitals  BP 96/68 (BP Location: Left arm)   Pulse 82   Temp 98.2 °F (36.8 °C) (Temporal)   Ht 5' 4" (1.626 m)   Wt 53.1 kg (117 lb)   LMP  (LMP Unknown)   SpO2 96%   BMI 20.08 kg/m²       Physical Exam  Vitals reviewed.   Constitutional:       Appearance: Normal appearance. She is normal weight.   HENT:      Right Ear: Tympanic membrane, ear canal and external ear normal.      Left Ear: Tympanic membrane, ear canal and external ear normal.      Nose: Nose normal.      Mouth/Throat:      Mouth: Mucous membranes are moist.      Pharynx: Oropharynx is clear.   Eyes:      Conjunctiva/sclera: Conjunctivae normal.   Cardiovascular:      Rate and Rhythm: Normal rate and regular rhythm.      Pulses: Normal pulses.      Heart sounds: Normal heart sounds.   Pulmonary:      Effort: Pulmonary effort is normal.      Breath sounds: Normal breath sounds. No wheezing, rhonchi or rales.      Comments: On continuous oxygen @2L  Abdominal:      General: Bowel sounds are normal.      Palpations: Abdomen is soft.   Musculoskeletal:         General: Normal range of motion.      Cervical back: Normal range of motion and neck supple.   Skin:     General: Skin is warm and dry.      Capillary Refill: Capillary refill takes less than 2 seconds.   Neurological:      Mental Status: She is alert and oriented to person, place, and time.   Psychiatric:         Mood and Affect: Mood normal.         Behavior: Behavior normal.               No data to display                  8/23/2023     2:30 PM 6/21/2023     3:45 PM   Fall Risk Assessment - Outpatient   Mobility Status Ambulatory    Number of falls 0 0   Identified as fall risk False            Depression Screening  Over the past two weeks, has the patient felt down, depressed, or hopeless?: No  Over the past two weeks, has the patient felt little interest or pleasure in doing things?: No  Functional Ability/Safety Screening  Was the patient's timed Up & Go test unsteady " or longer than 30 seconds?: No  Does the patient need help with phone, transportation, shopping, preparing meals, housework, laundry, meds, or managing money?: No  Does the patient's home have rugs in the hallway, lack grab bars in the bathroom, lack handrails on the stairs or have poor lighting?: No  Have you noticed any hearing difficulties?: No    Labs Reviewed:  Chemistry:  Lab Results   Component Value Date     06/08/2023    K 4.5 06/08/2023    CHLORIDE 96 (L) 06/08/2023    BUN 16.0 06/08/2023    CREATININE 0.71 06/08/2023    EGFRNORACEVR >90 06/08/2023    GLUCOSE 104 06/08/2023    CALCIUM 9.5 06/08/2023    ALKPHOS 60 06/08/2023    LABPROT 6.8 06/08/2023    ALBUMIN 4.0 06/08/2023    AST 23 06/08/2023    ALT 10 06/08/2023    MG 1.80 03/06/2023    PHOS 2.8 02/02/2023    TSH 7.120 (H) 06/21/2023    LCCFJO7KTLR 0.71 (L) 06/21/2023        Lab Results   Component Value Date    HGBA1C 5.5 06/08/2023        Hematology:  Lab Results   Component Value Date    WBC 3.86 (L) 06/08/2023    RBC 3.62 (L) 06/08/2023    HGB 10.2 (L) 06/08/2023    HCT 31.7 (L) 06/08/2023    MCV 87.6 06/08/2023    MCH 28.2 06/08/2023    MCHC 32.2 06/08/2023    RDW 14.0 06/08/2023     06/08/2023    MPV 11.6 06/08/2023       Lipid Panel:  Lab Results   Component Value Date    CHOL 302 (H) 06/08/2023    HDL 97 (H) 06/08/2023    DLDL 157.2 (H) 06/08/2023    TRIG 90 06/08/2023        Assessment & Plan:  1. Medicare annual wellness visit, subsequent    2. Primary hypertension  Overview:  On Coreg 3.125 mg b.i.d., Lasix 20 mg daily    Assessment & Plan:  Well controlled.   Low Sodium Diet (DASH Diet - Less than 2 grams of sodium per day).  Monitor blood pressure daily and log. Report consistent numbers greater than 140/90.  Maintain healthy weight with goal BMI <30. Exercise 30 minutes per day, 5 days per week.    3. Type 2 diabetes mellitus with diabetic polyneuropathy, without long-term current use of insulin  Overview:  Metformin 500 mg  BID    Take all medications as directed; compliance is critical for managing your diabetes.   Follow American Diabetic Association (ADA) dietary guidelines for eating and implement exercise into your daily regimen.   Check your glucose levels each morning and record for review at follow up.    4. Mixed hyperlipidemia  Overview:  Rosuvastatin 20 mg daily; noncompliant.    Assessment & Plan:  Lab Results   Component Value Date    CHOL 302 (H) 06/08/2023    HDL 97 (H) 06/08/2023    DLDL 157.2 (H) 06/08/2023    TRIG 90 06/08/2023     LDL Goal: 100  Resume rosuvastatin and take every day.  Educated on dietary modifications. Follow a low cholesterol, low saturated fat diet with less that 200mg of cholesterol a day.  Avoid fried foods and high saturated fats.  Increase dietary fiber.  Regular exercise can reduce LDL (bad cholesterol) and raise HDL (good cholesterol). Encourage physical activity 5 times per week for 30 minutes per day.     5. Acquired hypothyroidism  Overview:  Lab Results   Component Value Date    TSH 7.120 (H) 06/21/2023    [id number] 0.71 (L) 06/21/2023     On Levothyroxine 50 mcg daily.    Assessment & Plan:  Discussed importance of compliance with this medication.  She needs to start taking this on a daily basis.  We will recheck in 6-8 weeks.    Orders:  -     Cancel: TSH; Future; Expected date: 09/23/2023  -     TSH; Future; Expected date: 09/28/2023    6. Pulmonary emphysema, unspecified emphysema type  Overview:  On Trelegy Ellipta daily and theophylline 400 mg daily.  Nebulizers p.r.n.    Orders:  -     theophylline 400 mg Tb24; Take 1 tablet (400 mg total) by mouth once daily.  Dispense: 90 tablet; Refill: 3    7. On home oxygen therapy    8. CHF (congestive heart failure), NYHA class III, acute on chronic, diastolic  -     carvediloL (COREG) 3.125 MG tablet; Take 1 tablet (3.125 mg total) by mouth 2 (two) times daily.  Dispense: 180 tablet; Refill: 3  -     Comprehensive Metabolic Panel;  Future; Expected date: 09/23/2023  -     CBC Auto Differential; Future; Expected date: 09/23/2023    9. Iron deficiency anemia secondary to inadequate dietary iron intake  Stable     10. OAB (overactive bladder)  Overview:  On oxybutynin 5 mg daily.    Orders:  -     oxybutynin (DITROPAN-XL) 5 MG TR24; Take 1 tablet (5 mg total) by mouth once daily.  Dispense: 90 tablet; Refill: 3    11. Gastroesophageal reflux disease without esophagitis  Overview:  On Dexlansoprazole 30 mg daily.     Assessment & Plan:  Take medication:   Preventive Measures:   Avoid spicy, acidic, fried foods and alcohol.  Eat 2-3 hours before going to bed. Remain upright for 30-60 minutes after eating.   Avoid tight clothing, chew food thoroughly.  Reduce caffeine intake, avoid soda.          Breast Cancer Screening- uTD   Colon Cancer Screening -  UTD  Eye Exam- Recommend annually.  Dental Exam- Recommend biannually.    Vaccinations:  Immunization History   Administered Date(s) Administered    COVID-19 Vaccine 04/20/2021, 05/20/2021    Influenza - High Dose - PF (65 years and older) 10/22/2021    Influenza - Quadrivalent - PF *Preferred* (6 months and older) 10/10/2019    Influenza - Trivalent - PF (ADULT) 03/30/2016    Pneumococcal Conjugate - 13 Valent 09/22/2021       Future Appointments   Date Time Provider Department Center   9/22/2023  9:00 AM PRE-ADMIT, [facility]   9/27/2023 12:00 PM OA GI VR [facility]   10/4/2023  9:40 AM LAB, [facility] LABORATORY [facility] Mor[facility]   2/23/2024  8:30 AM LAB, [facility] LABORATORY DRAW [facility] Morales [facility]   2/27/2024  9:00 AM [facility]   5/17/2024  9:30 AM [facility] US1 [facility] US [place]       Follow up in about 6 months (around 2/23/2024) for chronic illness with labs prior; PLUS labs to recheck thyroid in 6 weeks. Call sooner if needed.    WES[facility] Best    Lab Frequency Next Occurrence   Ambulatory  referral/consult to Home Health Once 03/08/2023   Ambulatory referral/consult to HOME Palliative Care Once 03/16/2023   US Retroperitoneal Complete Once 05/17/2023        Medicare Annual Wellness and Personalized Prevention Plan:   Fall Risk + Home Safety + Hearing Impairment + Depression Screen + Opioid and Substance Abuse Screening + Cognitive Impairment Screen + Health Risk Assessment all reviewed.     Health Maintenance Topics with due status: Not Due       Topic Last Completion Date    Lipid Panel 06/08/2023    Colorectal Cancer Screening 08/07/2023    Mammogram 08/21/2023      The patient's Health Maintenance was reviewed and the following appears to be due at this time:   Health Maintenance Due   Topic Date Due    Hepatitis C Screening  Never done    TETANUS VACCINE  Never done    DEXA Scan  Never done    Shingles Vaccine (1 of 2) Never done    COVID-19 Vaccine (3 - Mixed Product series) 07/15/2021    Pneumococcal Vaccines (Age 65+) (2 - PPSV23 or PCV20) 11/17/2021    Influenza Vaccine (1) 09/01/2023       Advance Care Planning   I attest to discussing Advance Care Planning with patient and/or family member.  Education was provided including the importance of the Health Care Power of , Advance Directives, and/or [place] documentation.  The patient expressed understanding to the importance of this information and discussion.       Follow up in about 6 months (around 2/23/2024) for chronic illness with labs prior; PLUS labs to recheck thyroid in 6 weeks. In addition to their scheduled follow up, the patient has also been instructed to follow up on as needed basis.

## 2023-08-29 PROBLEM — R31.9 HEMATURIA: Status: RESOLVED | Noted: 2023-01-22 | Resolved: 2023-08-29

## 2023-08-29 PROBLEM — E87.6 HYPOKALEMIA: Status: RESOLVED | Noted: 2023-01-22 | Resolved: 2023-08-29

## 2023-08-29 PROBLEM — E11.42 TYPE 2 DIABETES MELLITUS WITH DIABETIC POLYNEUROPATHY, WITHOUT LONG-TERM CURRENT USE OF INSULIN: Status: ACTIVE | Noted: 2023-03-21

## 2023-08-29 PROBLEM — E89.0 POSTOPERATIVE HYPOTHYROIDISM: Status: RESOLVED | Noted: 2018-01-23 | Resolved: 2023-08-29

## 2023-08-29 PROBLEM — K21.9 LARYNGOPHARYNGEAL REFLUX: Status: RESOLVED | Noted: 2023-03-21 | Resolved: 2023-08-29

## 2023-09-01 DIAGNOSIS — K92.1 MELENA: Primary | ICD-10-CM

## 2023-09-11 LAB
LEFT EYE DM RETINOPATHY: NEGATIVE
RIGHT EYE DM RETINOPATHY: NEGATIVE

## 2023-09-13 ENCOUNTER — PATIENT OUTREACH (OUTPATIENT)
Dept: ADMINISTRATIVE | Facility: HOSPITAL | Age: 68
End: 2023-09-13
Payer: MEDICARE

## 2023-09-13 NOTE — PROGRESS NOTES
Population Health. [facility].  The following record(s)  below were uploaded for Health Maintenance .    DM EYE EXAM   9/11/23

## 2023-09-19 ENCOUNTER — HOSPITAL ENCOUNTER (OUTPATIENT)
Dept: RADIOLOGY | Facility: HOSPITAL | Age: 68
Discharge: HOME | End: 2023-09-19
Attending: SURGERY
Payer: MEDICARE

## 2023-09-19 DIAGNOSIS — K92.1 MELENA: ICD-10-CM

## 2023-09-19 PROCEDURE — 74248 X-RAY SM INT F-THRU STD: CPT | Mod: TC

## 2023-09-19 PROCEDURE — A9698 NON-RAD CONTRAST MATERIALNOC: HCPCS | Performed by: SURGERY

## 2023-09-19 PROCEDURE — 74240 X-RAY XM UPR GI TRC 1CNTRST: CPT | Mod: TC

## 2023-09-19 PROCEDURE — 25500020 PHARM REV CODE 255: Performed by: SURGERY

## 2023-09-19 RX ADMIN — BARIUM SULFATE 135 ML: 980 POWDER, FOR SUSPENSION ORAL at 09:09

## 2023-09-21 PROBLEM — N32.81 OAB (OVERACTIVE BLADDER): Status: ACTIVE | Noted: 2023-09-21

## 2023-09-21 PROBLEM — E78.2 MIXED HYPERLIPIDEMIA: Status: ACTIVE | Noted: 2023-09-21

## 2023-09-21 PROBLEM — E03.9 ACQUIRED HYPOTHYROIDISM: Status: ACTIVE | Noted: 2023-06-28

## 2023-09-21 NOTE — ASSESSMENT & PLAN NOTE
Well controlled.   Low Sodium Diet (DASH Diet - Less than 2 grams of sodium per day).  Monitor blood pressure daily and log. Report consistent numbers greater than 140/90.  Maintain healthy weight with goal BMI <30. Exercise 30 minutes per day, 5 days per week.

## 2023-09-21 NOTE — ASSESSMENT & PLAN NOTE
Lab Results   Component Value Date    CHOL 302 (H) 06/08/2023    HDL 97 (H) 06/08/2023    DLDL 157.2 (H) 06/08/2023    TRIG 90 06/08/2023     LDL Goal: 100  Resume rosuvastatin and take every day.  Educated on dietary modifications. Follow a low cholesterol, low saturated fat diet with less that 200mg of cholesterol a day.  Avoid fried foods and high saturated fats.  Increase dietary fiber.  Regular exercise can reduce LDL (bad cholesterol) and raise HDL (good cholesterol). Encourage physical activity 5 times per week for 30 minutes per day.

## 2023-09-21 NOTE — ASSESSMENT & PLAN NOTE
Discussed importance of compliance with this medication.  She needs to start taking this on a daily basis.  We will recheck in 6-8 weeks.

## 2023-09-21 NOTE — ASSESSMENT & PLAN NOTE
Take medication:   Preventive Measures:   Avoid spicy, acidic, fried foods and alcohol.  Eat 2-3 hours before going to bed. Remain upright for 30-60 minutes after eating.   Avoid tight clothing, chew food thoroughly.  Reduce caffeine intake, avoid soda.

## 2023-09-22 ENCOUNTER — HOSPITAL ENCOUNTER (OUTPATIENT)
Dept: PREADMISSION TESTING | Facility: HOSPITAL | Age: 68
Discharge: HOME | End: 2023-09-22
Attending: SURGERY
Payer: MEDICARE

## 2023-09-22 ENCOUNTER — HOSPITAL ENCOUNTER (OUTPATIENT)
Dept: RADIOLOGY | Facility: HOSPITAL | Age: 68
Discharge: HOME | End: 2023-09-22
Attending: SURGERY
Payer: MEDICARE

## 2023-09-22 VITALS — WEIGHT: 114 LBS | BODY MASS INDEX: 19.46 KG/M2 | HEIGHT: 64 IN

## 2023-09-22 DIAGNOSIS — K92.1 BLOOD IN STOOL: ICD-10-CM

## 2023-09-22 DIAGNOSIS — K92.1 BLOOD IN STOOL: Primary | ICD-10-CM

## 2023-09-22 PROCEDURE — 93010 EKG 12-LEAD: ICD-10-PCS | Mod: ,,, | Performed by: INTERNAL MEDICINE

## 2023-09-22 PROCEDURE — 93005 ELECTROCARDIOGRAM TRACING: CPT

## 2023-09-22 PROCEDURE — 93010 ELECTROCARDIOGRAM REPORT: CPT | Mod: ,,, | Performed by: INTERNAL MEDICINE

## 2023-09-22 PROCEDURE — 71046 X-RAY EXAM CHEST 2 VIEWS: CPT | Mod: TC

## 2023-09-22 NOTE — DISCHARGE INSTRUCTIONS
Pre-Operative Instructions    Please follow the instructions listed below. Pre-op Anesthesia and Sedation Information Sheet    · Should you develop a cold, temperature, sore throat, cough or any other indication of illness prior to your scheduled procedure, please notify your physician.    · DO NOT EAT OR DRINK ANYTHING AFTER 12:00 MIDNIGHT the night before your surgery or the morning of your procedure until after your procedure is completed and instructed by your nurse. This includes coffee, water, gum, and mints. You may brush your teeth, but do not swallow any water. Do not use any tobacco products the morning of the procedure (including dips, chews, smoking).    · Take all scheduled morning heart/blood pressure, respiratory, and/or thyroid medication prior to arrival on the day of the procedure with a sip of water.    · If you are diabetic, do not take any diabetic medication the day of your surgery.    · If you have medications you take routinely, bring the bottles with you to the hospital.    · Please bathe and shampoo your hair before coming to the hospital.    · Dress casually and wear loose, comfortable clothing. Please remove all make-up and nail polish. You will be given a patient gown for surgery. You must remove all undergarments, jewelry, and dentures unless instructed otherwise. Please leave all valuables at home or in the care of your family.    · Two family members are welcome to come with you to the hospital and stay with you in your room.    · We will call you between 1:00 and 3:00 p.m. the business day before your scheduled surgery with your arrival time for the day of surgery. Any questions about your arrival time can be directed to [phone].    · Any questions or further information should be directed to the out-patient nurse at [phone] between the hours of 8:00 a.m. and 3:00 p.m., Monday through Friday.    · You will need a responsible adult to drive you home when you are released from the  [facility]. An adult/parent must remain in the hospital at all times with a pediatric patient.    · Day of surgery: Go to Outpatient [place] [facility].           Patient Information Advice for Surgery Patients    · If you are having surgery or any other invasive procedure, make sure that you and the health care professionals treating you all agree on exactly what will be done during the surgery or procedure.    · Verify the information on your patient identity bracelet. Alert a member of the health care team if the information is incorrect and insist that it be replaced immediately.    · Make sure the operative consent you sign includes the correct information about your surgical site (i.e., right or left) and procedure. Thoroughly read all medical forms and make sure you understand them before you sign anything.    · Some surgical sites will be marked. If appropriate your surgical site will be marked by your physician, actively participate in the process.    · Ask questions and speak up if you have any concerns. Keep asking questions until you understand the answers. Ask members of the healthcare team what steps will be taken to ensure your safety and correct site surgery.    · Take a responsible family member or friend to accompany you to your doctors visits and on the day of your surgery or procedure so that they can serve as your advocate and speak up for you if you are unable.    · Make sure that any post-surgery instructions are given to you prior to your surgery, preferably in writing. Also, make sure a friend or family member is present when the directions are being given. This will help reduce your overall stress and ensure that as many parties as possible are fully informed.          Preventing Surgical Site Infections    What is a Surgical Site Infection (SSI)?    A surgical site infection is an infection that occurs after surgery in the part of the body where the surgery took place. Most patients who have surgery  do not develop an infection. However, infections develop in about 1 to 3 out of every 100 patients who have surgery.  Some of the common symptoms of a surgical site infection are:  Redness and pain around the area where you had surgery ,  Drainage of cloudy fluid from your surgical wound,  Fever    Can SSIs be treated?    Yes. Most surgical site infections can be treated with antibiotics. The antibiotic given to you depends on the bacteria (germs) causing the infection.    What are some of the things that hospitals are doing to prevent SSIs?    To prevent SSIs, doctors, nurses, and other healthcare providers:   Clean their hands and arms up to their elbows with an antiseptic agent just before the surgery.   Clean their hands with soap and water or an alcohol-based hand rub before and after caring for each patient.   May remove some of your hair immediately before your surgery using electric clippers if the hair is in the same area where the procedure will occur. They should not shave you with a razor.   Wear special hair covers, masks, gowns, and gloves during surgery to keep the surgery area clean.   Give you antibiotics before your surgery starts. In most cases, you should get antibiotics within 60 minutes before the surgery starts and the antibiotics should be stopped within 24 hours after surgery.   Clean the skin at the site of your surgery with a special soap that kills germs.    What can I do to help prevent SSIs?    Before your surgery:     Tell your doctor about other medical problems you may have. Health problems such as allergies, diabetes, and obesity could affect your surgery and your treatment.   Quit smoking. Patients who smoke get more infections. Talk to your doctor about how you can quit.   Do not shave near where you will have surgery. Shaving with a razor can irritate your skin and make it easier to develop an infection.    After your surgery:     Make sure that your healthcare providers clean  their hands before examining you, either with soap and water or an alcohol-based hand rub.   Family and friends who visit you should not touch the surgical wound or dressings.   Family and friends should clean their hands with soap and water or an alcohol-based hand rub before and after visiting you. If you do not see them clean their hands, ask them to clean their hands.    What do I need to do when I go home from the hospital?     Before you go home, your doctor or nurse should explain everything you need to know about taking care of your wound. Make sure you understand how to care for your wound before you leave the hospital.   Always clean your hands before and after caring for your wound.   Before you go home, make sure you know who to contact if you have questions or problems.   If you have any symptoms of an infection, such as redness and pain at the surgery site, drainage, or fever, call your doctor immediately.    If you have additional questions, please ask your doctor or nurse.    If you do not see your providers clean their hands, please ask them to do so.

## 2023-09-26 ENCOUNTER — ANESTHESIA EVENT (OUTPATIENT)
Dept: GASTROENTEROLOGY | Facility: HOSPITAL | Age: 68
End: 2023-09-26
Payer: MEDICARE

## 2023-09-26 NOTE — ANESTHESIA PREPROCEDURE EVALUATION
09/26/2023  Michel De La Paz is a 68 y.o., female.      Pre-op Assessment    I have reviewed the Patient Summary Reports.     I have reviewed the Nursing Notes. I have reviewed the NPO Status.   I have reviewed the Medications.     Review of Systems  Anesthesia Hx:  No problems with previous Anesthesia  Denies Family Hx of Anesthesia complications.   Denies Personal Hx of Anesthesia complications.   Hematology/Oncology:  Hematology Normal   Oncology Normal     EENT/Dental:EENT/Dental Normal   Cardiovascular:   Exercise tolerance: poor Hypertension, well controlled CHF Follwoed by Dr Ren Congestive Heart Failure (CHF)  Hypertension, Essential Hypertension    Pulmonary:   COPD, severe Asthma Sleep Apnea O2 THERAPY 2L/NC 24HRS PER DAY Asthma:  Chronic Obstructive Pulmonary Disease (COPD):    Education provided regarding risk of obstructive sleep apnea  Medical reason for not educating patient about risks of obstructive sleep apnea   Renal/:  Renal/ Normal     Hepatic/GI:   GERD, well controlled    Musculoskeletal:  Musculoskeletal Normal    Neurological:   Neuromuscular Disease,  Neuromuscular Disease   Endocrine:   Diabetes, well controlled, type 2 Hypothyroidism  Diabetes, Type 2 Diabetes    Dermatological:  Skin Normal    Psych:  Psychiatric Normal           Physical Exam  General: Well nourished, Cooperative, Alert and Oriented    Airway:  Mallampati: II / II  Mouth Opening: Normal  TM Distance: Normal  Tongue: Normal  Neck ROM: Normal ROM  Pre-Existing Airway: Tracheal Stoma    Dental:  Loose teeth        Anesthesia Plan  Type of Anesthesia, risks & benefits discussed:    Anesthesia Type: MAC  Intra-op Monitoring Plan: Standard ASA Monitors  Post Op Pain Control Plan: multimodal analgesia  Induction:  IV  Airway Plan: Direct  Informed Consent: Informed consent signed with the Patient and all parties  understand the risks and agree with anesthesia plan.  All questions answered. Patient consented to blood products? Yes  ASA Score: 4  Day of Surgery Review of History & Physical: H&P Update referred to the surgeon/provider.I have interviewed and examined the patient. I have reviewed the patient's H&P dated: There are no significant changes.     Ready For Surgery From Anesthesia Perspective.     .

## 2023-09-27 ENCOUNTER — HOSPITAL ENCOUNTER (OUTPATIENT)
Dept: GASTROENTEROLOGY | Facility: HOSPITAL | Age: 68
Discharge: HOME | End: 2023-09-27
Attending: SURGERY
Payer: MEDICARE

## 2023-09-27 ENCOUNTER — ANESTHESIA (OUTPATIENT)
Dept: GASTROENTEROLOGY | Facility: HOSPITAL | Age: 68
End: 2023-09-27
Payer: MEDICARE

## 2023-09-27 VITALS
RESPIRATION RATE: 20 BRPM | BODY MASS INDEX: 19.46 KG/M2 | OXYGEN SATURATION: 100 % | SYSTOLIC BLOOD PRESSURE: 109 MMHG | DIASTOLIC BLOOD PRESSURE: 63 MMHG | WEIGHT: 114 LBS | HEART RATE: 81 BPM | HEIGHT: 64 IN | TEMPERATURE: 98 F

## 2023-09-27 DIAGNOSIS — K92.1 BLOOD IN STOOL: Primary | ICD-10-CM

## 2023-09-27 DIAGNOSIS — R10.9 ABDOMINAL PAIN: ICD-10-CM

## 2023-09-27 LAB — POCT GLUCOSE: 68 MG/DL (ref 70–110)

## 2023-09-27 PROCEDURE — 63600175 PHARM REV CODE 636 W HCPCS: Performed by: NURSE ANESTHETIST, CERTIFIED REGISTERED

## 2023-09-27 PROCEDURE — D9220A PRA ANESTHESIA: ICD-10-PCS | Mod: ,,, | Performed by: NURSE ANESTHETIST, CERTIFIED REGISTERED

## 2023-09-27 PROCEDURE — 27201423 OPTIME MED/SURG SUP & DEVICES STERILE SUPPLY

## 2023-09-27 PROCEDURE — 37000009 HC ANESTHESIA EA ADD 15 MINS

## 2023-09-27 PROCEDURE — 82962 GLUCOSE BLOOD TEST: CPT

## 2023-09-27 PROCEDURE — 25000003 PHARM REV CODE 250: Performed by: SURGERY

## 2023-09-27 PROCEDURE — 45380 COLONOSCOPY AND BIOPSY: CPT | Performed by: SURGERY

## 2023-09-27 PROCEDURE — S5010 5% DEXTROSE AND 0.45% SALINE: HCPCS | Performed by: SURGERY

## 2023-09-27 PROCEDURE — D9220A PRA ANESTHESIA: Mod: ,,, | Performed by: NURSE ANESTHETIST, CERTIFIED REGISTERED

## 2023-09-27 PROCEDURE — 37000008 HC ANESTHESIA 1ST 15 MINUTES

## 2023-09-27 PROCEDURE — 25000003 PHARM REV CODE 250: Performed by: NURSE ANESTHETIST, CERTIFIED REGISTERED

## 2023-09-27 RX ORDER — DEXTROSE MONOHYDRATE AND SODIUM CHLORIDE 5; .45 G/100ML; G/100ML
INJECTION, SOLUTION INTRAVENOUS CONTINUOUS
Status: DISCONTINUED | OUTPATIENT
Start: 2023-09-27 | End: 2023-09-28 | Stop reason: HOSPADM

## 2023-09-27 RX ORDER — GLYCOPYRROLATE 0.2 MG/ML
INJECTION INTRAMUSCULAR; INTRAVENOUS
Status: DISCONTINUED | OUTPATIENT
Start: 2023-09-27 | End: 2023-09-27

## 2023-09-27 RX ORDER — LIDOCAINE HYDROCHLORIDE 20 MG/ML
INJECTION INTRAVENOUS
Status: DISCONTINUED | OUTPATIENT
Start: 2023-09-27 | End: 2023-09-27

## 2023-09-27 RX ORDER — SODIUM CHLORIDE 9 MG/ML
INJECTION, SOLUTION INTRAVENOUS CONTINUOUS
Status: CANCELLED | OUTPATIENT
Start: 2023-09-27

## 2023-09-27 RX ORDER — ACETAMINOPHEN 325 MG/1
650 TABLET ORAL ONCE
Status: COMPLETED | OUTPATIENT
Start: 2023-09-27 | End: 2023-09-27

## 2023-09-27 RX ORDER — PROPOFOL 10 MG/ML
VIAL (ML) INTRAVENOUS
Status: DISCONTINUED | OUTPATIENT
Start: 2023-09-27 | End: 2023-09-27

## 2023-09-27 RX ADMIN — PROPOFOL 20 MG: 10 INJECTION, EMULSION INTRAVENOUS at 02:09

## 2023-09-27 RX ADMIN — ACETAMINOPHEN 650 MG: 325 TABLET, FILM COATED ORAL at 03:09

## 2023-09-27 RX ADMIN — LIDOCAINE HYDROCHLORIDE 50 MG: 20 INJECTION, SOLUTION INTRAVENOUS at 01:09

## 2023-09-27 RX ADMIN — PROPOFOL 20 MG: 10 INJECTION, EMULSION INTRAVENOUS at 01:09

## 2023-09-27 RX ADMIN — PROPOFOL 30 MG: 10 INJECTION, EMULSION INTRAVENOUS at 01:09

## 2023-09-27 RX ADMIN — GLYCOPYRROLATE 0.2 MG: 0.2 INJECTION INTRAMUSCULAR; INTRAVENOUS at 01:09

## 2023-09-27 RX ADMIN — PROPOFOL 50 MG: 10 INJECTION, EMULSION INTRAVENOUS at 01:09

## 2023-09-27 RX ADMIN — DEXTROSE AND SODIUM CHLORIDE: 5; 450 INJECTION, SOLUTION INTRAVENOUS at 12:09

## 2023-09-27 RX ADMIN — PROPOFOL 10 MG: 10 INJECTION, EMULSION INTRAVENOUS at 02:09

## 2023-09-27 NOTE — DISCHARGE SUMMARY
[facility]  Short Stay    Colonoscopy      OUTCOME: Patient tolerated treatment/procedure well without complication and is now ready for discharge.    DISPOSITION: Home or Self Care    FINAL DIAGNOSIS:  Blood in stool poor prep polyp in the ascending colon removed with the cold biopsy forceps needs a air-contrast barium enema for clearance    FOLLOWUP: In clinic 1 week    DISCHARGE INSTRUCTIONS:    Discharge Procedure Orders   Diet general        TIME SPENT ON DISCHARGE:  5 minutes

## 2023-09-27 NOTE — ANESTHESIA POSTPROCEDURE EVALUATION
Anesthesia Post Evaluation    Patient: Michel De La Paz    Procedure(s) Performed: * No procedures listed *    Final Anesthesia Type: MAC      Patient location during evaluation: GI PACU  Patient participation: Yes- Able to Participate  Level of consciousness: awake and alert, awake and oriented  Post-procedure vital signs: reviewed and stable  Pain management: adequate  Airway patency: patent    PONV status at discharge: No PONV  Anesthetic complications: no      Cardiovascular status: blood pressure returned to baseline  Respiratory status: unassisted, room air and spontaneous ventilation  Hydration status: euvolemic  Follow-up not needed.          Vitals Value Taken Time   /55 09/27/23 1247   Temp 37.3 °C (99.2 °F) 09/27/23 1247   Pulse 73 09/27/23 1247   Resp 20 09/27/23 1247   SpO2 98 % 09/27/23 1247         No case tracking events are documented in the log.      Pain/Addison Score: No data recorded

## 2023-09-27 NOTE — DISCHARGE INSTRUCTIONS
Proceed to your appointment tomorrow in Radiology for Barium Enema.   See Handout on instructions to follow for procedure.    Activity:  decrease activity today, no driving today, resume all activity tomorrow  Notify MD:  increased swelling of abdomen, excessive nausea/vomiting, excessive bright red bleeding from rectum  Medications:  continue your home medications. Keep a list of your home medications at all times for emergencies.

## 2023-09-28 ENCOUNTER — HOSPITAL ENCOUNTER (OUTPATIENT)
Dept: RADIOLOGY | Facility: HOSPITAL | Age: 68
Discharge: HOME | End: 2023-09-28
Attending: SURGERY
Payer: MEDICARE

## 2023-09-28 PROCEDURE — 74280 X-RAY XM COLON 2CNTRST STD: CPT | Mod: TC

## 2023-10-04 PROCEDURE — 85025 COMPLETE CBC W/AUTO DIFF WBC: CPT | Performed by: NURSE PRACTITIONER

## 2023-10-04 PROCEDURE — 80053 COMPREHEN METABOLIC PANEL: CPT | Performed by: NURSE PRACTITIONER

## 2023-10-04 PROCEDURE — 80061 LIPID PANEL: CPT | Performed by: NURSE PRACTITIONER

## 2023-10-04 PROCEDURE — 84443 ASSAY THYROID STIM HORMONE: CPT | Performed by: NURSE PRACTITIONER

## 2023-10-06 DIAGNOSIS — D50.8 IRON DEFICIENCY ANEMIA SECONDARY TO INADEQUATE DIETARY IRON INTAKE: Primary | ICD-10-CM

## 2023-10-06 RX ORDER — FERROUS SULFATE 325(65) MG
325 TABLET ORAL EVERY OTHER DAY
Qty: 45 TABLET | Refills: 3 | Status: SHIPPED | OUTPATIENT
Start: 2023-10-06

## 2023-10-09 ENCOUNTER — TELEPHONE (OUTPATIENT)
Dept: FAMILY MEDICINE | Facility: CLINIC | Age: 68
End: 2023-10-09
Payer: MEDICARE

## 2023-10-11 ENCOUNTER — TELEPHONE (OUTPATIENT)
Dept: FAMILY MEDICINE | Facility: CLINIC | Age: 68
End: 2023-10-11
Payer: MEDICARE

## 2023-10-12 NOTE — TELEPHONE ENCOUNTER
I called and notified her that I faxed her dental clearance. She said that disability is needing some information. I explained to her that they have to send a request for medical records. She verbalized understanding.

## 2023-10-17 PROCEDURE — 85025 COMPLETE CBC W/AUTO DIFF WBC: CPT | Performed by: NURSE PRACTITIONER

## 2023-10-18 ENCOUNTER — TELEPHONE (OUTPATIENT)
Dept: FAMILY MEDICINE | Facility: CLINIC | Age: 68
End: 2023-10-18
Payer: MEDICARE

## 2023-10-18 NOTE — TELEPHONE ENCOUNTER
I called and notified her. She said that she is taking it every other day. She has an appt for labs in February so Mariajose said that she can wait until then but to call if she starts feeling bad. I gave pt her instructions and she verbalized understanding.

## 2023-10-18 NOTE — TELEPHONE ENCOUNTER
----- Message from Mariajose Hutchinson, FNP-C sent at 10/17/2023  4:24 PM CDT -----  I am assuming she began taking her iron supplementation every other day as we advised.  Recheck of blood work does not show any worsening of anemia.  It has not really been long enough to show much improvement.  Let us recheck it again in January.  I believe she has a follow-up already scheduled for February.

## 2023-10-30 DIAGNOSIS — E11.9 TYPE 2 DIABETES MELLITUS WITHOUT COMPLICATION, WITHOUT LONG-TERM CURRENT USE OF INSULIN: ICD-10-CM

## 2023-10-30 RX ORDER — METFORMIN HYDROCHLORIDE 500 MG/1
TABLET ORAL
Qty: 60 TABLET | Refills: 11 | Status: SHIPPED | OUTPATIENT
Start: 2023-10-30 | End: 2023-11-08 | Stop reason: SDUPTHER

## 2023-11-08 DIAGNOSIS — J44.9 CHRONIC OBSTRUCTIVE PULMONARY DISEASE, UNSPECIFIED COPD TYPE: ICD-10-CM

## 2023-11-08 DIAGNOSIS — N32.81 OAB (OVERACTIVE BLADDER): ICD-10-CM

## 2023-11-08 DIAGNOSIS — M54.41 CHRONIC MIDLINE LOW BACK PAIN WITH BILATERAL SCIATICA: ICD-10-CM

## 2023-11-08 DIAGNOSIS — E03.8 OTHER SPECIFIED HYPOTHYROIDISM: ICD-10-CM

## 2023-11-08 DIAGNOSIS — M54.42 CHRONIC MIDLINE LOW BACK PAIN WITH BILATERAL SCIATICA: ICD-10-CM

## 2023-11-08 DIAGNOSIS — E11.9 TYPE 2 DIABETES MELLITUS WITHOUT COMPLICATION, WITHOUT LONG-TERM CURRENT USE OF INSULIN: ICD-10-CM

## 2023-11-08 DIAGNOSIS — G89.29 CHRONIC MIDLINE LOW BACK PAIN WITH BILATERAL SCIATICA: ICD-10-CM

## 2023-11-08 DIAGNOSIS — K59.09 CHRONIC CONSTIPATION: ICD-10-CM

## 2023-11-08 RX ORDER — LEVOTHYROXINE SODIUM 50 UG/1
50 TABLET ORAL
Qty: 30 TABLET | Refills: 11 | Status: SHIPPED | OUTPATIENT
Start: 2023-11-08 | End: 2024-02-16

## 2023-11-08 RX ORDER — METFORMIN HYDROCHLORIDE 500 MG/1
500 TABLET ORAL 2 TIMES DAILY
Qty: 60 TABLET | Refills: 11 | Status: SHIPPED | OUTPATIENT
Start: 2023-11-08 | End: 2024-02-16

## 2023-11-08 RX ORDER — ALBUTEROL SULFATE 90 UG/1
AEROSOL, METERED RESPIRATORY (INHALATION)
Qty: 18 G | Refills: 11 | Status: SHIPPED | OUTPATIENT
Start: 2023-11-08

## 2023-11-08 RX ORDER — OXYBUTYNIN CHLORIDE 5 MG/1
5 TABLET, EXTENDED RELEASE ORAL DAILY
Qty: 90 TABLET | Refills: 3 | Status: SHIPPED | OUTPATIENT
Start: 2023-11-08

## 2023-11-08 RX ORDER — DULOXETIN HYDROCHLORIDE 30 MG/1
30 CAPSULE, DELAYED RELEASE ORAL DAILY
Qty: 30 CAPSULE | Refills: 11 | Status: SHIPPED | OUTPATIENT
Start: 2023-11-08 | End: 2024-02-16

## 2023-11-08 NOTE — TELEPHONE ENCOUNTER
We received a fax from [facility] stating that she would like to change her pharmacy to them. I called her to verify that. She said that they will be her new pharmacy after 1/1/24.

## 2023-11-15 ENCOUNTER — TELEPHONE (OUTPATIENT)
Dept: FAMILY MEDICINE | Facility: CLINIC | Age: 68
End: 2023-11-15
Payer: MEDICARE

## 2023-11-15 DIAGNOSIS — E11.42 TYPE 2 DIABETES MELLITUS WITH DIABETIC POLYNEUROPATHY, WITHOUT LONG-TERM CURRENT USE OF INSULIN: Primary | ICD-10-CM

## 2023-11-15 RX ORDER — INSULIN PUMP SYRINGE, 3 ML
EACH MISCELLANEOUS
Qty: 1 EACH | Refills: 0 | Status: SHIPPED | OUTPATIENT
Start: 2023-11-15 | End: 2024-11-14

## 2023-11-15 RX ORDER — LANCETS
EACH MISCELLANEOUS
Qty: 50 EACH | Refills: 11 | Status: SHIPPED | OUTPATIENT
Start: 2023-11-15

## 2023-12-05 DIAGNOSIS — E11.42 TYPE 2 DIABETES MELLITUS WITH DIABETIC POLYNEUROPATHY, WITHOUT LONG-TERM CURRENT USE OF INSULIN: ICD-10-CM

## 2023-12-05 RX ORDER — CALCIUM CITRATE/VITAMIN D3 200MG-6.25
TABLET ORAL
Qty: 50 STRIP | Refills: 11 | OUTPATIENT
Start: 2023-12-05

## 2024-01-01 ENCOUNTER — TELEPHONE (OUTPATIENT)
Dept: FAMILY MEDICINE | Facility: CLINIC | Age: 69
End: 2024-01-01
Payer: MEDICARE

## 2024-01-01 ENCOUNTER — OFFICE VISIT (OUTPATIENT)
Dept: FAMILY MEDICINE | Facility: CLINIC | Age: 69
End: 2024-01-01
Payer: MEDICARE

## 2024-01-01 ENCOUNTER — HOSPITAL ENCOUNTER (EMERGENCY)
Facility: HOSPITAL | Age: 69
Discharge: HOME | End: 2024-04-10
Attending: INTERNAL MEDICINE
Payer: MEDICARE

## 2024-01-01 ENCOUNTER — LAB VISIT (OUTPATIENT)
Dept: LAB | Facility: HOSPITAL | Age: 69
End: 2024-01-01
Attending: NURSE PRACTITIONER
Payer: MEDICARE

## 2024-01-01 ENCOUNTER — PATIENT OUTREACH (OUTPATIENT)
Dept: EMERGENCY MEDICINE | Facility: HOSPITAL | Age: 69
End: 2024-01-01
Payer: MEDICARE

## 2024-01-01 VITALS
OXYGEN SATURATION: 98 % | DIASTOLIC BLOOD PRESSURE: 55 MMHG | WEIGHT: 122 LBS | HEIGHT: 61 IN | SYSTOLIC BLOOD PRESSURE: 100 MMHG | RESPIRATION RATE: 18 BRPM | TEMPERATURE: 98 F | HEART RATE: 79 BPM | BODY MASS INDEX: 23.03 KG/M2

## 2024-01-01 VITALS
SYSTOLIC BLOOD PRESSURE: 100 MMHG | OXYGEN SATURATION: 96 % | DIASTOLIC BLOOD PRESSURE: 62 MMHG | HEART RATE: 72 BPM | BODY MASS INDEX: 20.83 KG/M2 | TEMPERATURE: 98 F | HEIGHT: 64 IN | WEIGHT: 122 LBS

## 2024-01-01 VITALS
WEIGHT: 124.63 LBS | OXYGEN SATURATION: 100 % | SYSTOLIC BLOOD PRESSURE: 100 MMHG | DIASTOLIC BLOOD PRESSURE: 52 MMHG | TEMPERATURE: 98 F | HEIGHT: 64 IN | BODY MASS INDEX: 21.28 KG/M2

## 2024-01-01 DIAGNOSIS — E03.8 OTHER SPECIFIED HYPOTHYROIDISM: ICD-10-CM

## 2024-01-01 DIAGNOSIS — J41.8 MIXED SIMPLE AND MUCOPURULENT CHRONIC BRONCHITIS: ICD-10-CM

## 2024-01-01 DIAGNOSIS — I42.8 OTHER CARDIOMYOPATHIES: ICD-10-CM

## 2024-01-01 DIAGNOSIS — D63.8 ANEMIA IN OTHER CHRONIC DISEASES CLASSIFIED ELSEWHERE: ICD-10-CM

## 2024-01-01 DIAGNOSIS — Z99.81 ON HOME OXYGEN THERAPY: ICD-10-CM

## 2024-01-01 DIAGNOSIS — Z79.899 MEDICATION MANAGEMENT: ICD-10-CM

## 2024-01-01 DIAGNOSIS — G89.29 CHRONIC MIDLINE LOW BACK PAIN WITH BILATERAL SCIATICA: ICD-10-CM

## 2024-01-01 DIAGNOSIS — I10 PRIMARY HYPERTENSION: ICD-10-CM

## 2024-01-01 DIAGNOSIS — J44.9 CHRONIC OBSTRUCTIVE PULMONARY DISEASE, UNSPECIFIED COPD TYPE: Primary | ICD-10-CM

## 2024-01-01 DIAGNOSIS — R06.02 SHORTNESS OF BREATH: ICD-10-CM

## 2024-01-01 DIAGNOSIS — I50.33 CHF (CONGESTIVE HEART FAILURE), NYHA CLASS III, ACUTE ON CHRONIC, DIASTOLIC: ICD-10-CM

## 2024-01-01 DIAGNOSIS — N32.81 OAB (OVERACTIVE BLADDER): Primary | ICD-10-CM

## 2024-01-01 DIAGNOSIS — E11.9 TYPE 2 DIABETES MELLITUS WITHOUT COMPLICATION, WITHOUT LONG-TERM CURRENT USE OF INSULIN: ICD-10-CM

## 2024-01-01 DIAGNOSIS — I48.0 PAROXYSMAL ATRIAL FIBRILLATION: ICD-10-CM

## 2024-01-01 DIAGNOSIS — M54.42 CHRONIC MIDLINE LOW BACK PAIN WITH BILATERAL SCIATICA: ICD-10-CM

## 2024-01-01 DIAGNOSIS — J44.1 COPD EXACERBATION: Primary | ICD-10-CM

## 2024-01-01 DIAGNOSIS — K59.09 CHRONIC CONSTIPATION: ICD-10-CM

## 2024-01-01 DIAGNOSIS — R35.0 URINARY FREQUENCY: ICD-10-CM

## 2024-01-01 DIAGNOSIS — E11.42 TYPE 2 DIABETES MELLITUS WITH DIABETIC POLYNEUROPATHY, WITHOUT LONG-TERM CURRENT USE OF INSULIN: Primary | ICD-10-CM

## 2024-01-01 DIAGNOSIS — Z00.01 ANNUAL VISIT FOR GENERAL ADULT MEDICAL EXAMINATION WITH ABNORMAL FINDINGS: ICD-10-CM

## 2024-01-01 DIAGNOSIS — Z09 HOSPITAL DISCHARGE FOLLOW-UP: Primary | ICD-10-CM

## 2024-01-01 DIAGNOSIS — K21.9 GASTROESOPHAGEAL REFLUX DISEASE WITHOUT ESOPHAGITIS: ICD-10-CM

## 2024-01-01 DIAGNOSIS — M54.41 CHRONIC MIDLINE LOW BACK PAIN WITH BILATERAL SCIATICA: ICD-10-CM

## 2024-01-01 DIAGNOSIS — F32.5 MAJOR DEPRESSIVE DISORDER WITH SINGLE EPISODE, IN FULL REMISSION: ICD-10-CM

## 2024-01-01 LAB
BACTERIA UR CULT: NO GROWTH
BASOPHILS # BLD AUTO: 0.04 X10(3)/MCL
BASOPHILS NFR BLD AUTO: 0.7 %
BILIRUB SERPL-MCNC: NORMAL MG/DL
BLOOD URINE, POC: NORMAL
CLARITY, POC UA: CLEAR
COLOR, POC UA: YELLOW
EOSINOPHIL # BLD AUTO: 0.24 X10(3)/MCL (ref 0–0.9)
EOSINOPHIL NFR BLD AUTO: 4.3 %
ERYTHROCYTE [DISTWIDTH] IN BLOOD BY AUTOMATED COUNT: 18.2 % (ref 11.5–17)
FERRITIN SERPL-MCNC: 14.66 NG/ML (ref 4.63–204)
GLUCOSE UR QL STRIP: NORMAL
HCT VFR BLD AUTO: 30.4 % (ref 37–47)
HGB BLD-MCNC: 8.1 G/DL (ref 12–16)
IMM GRANULOCYTES # BLD AUTO: 0.01 X10(3)/MCL (ref 0–0.04)
IMM GRANULOCYTES NFR BLD AUTO: 0.2 %
IRON SATN MFR SERPL: ABNORMAL %
IRON SERPL-MCNC: 447 UG/DL (ref 50–170)
KETONES UR QL STRIP: NORMAL
LEUKOCYTE ESTERASE URINE, POC: NORMAL
LYMPHOCYTES # BLD AUTO: 0.74 X10(3)/MCL (ref 0.6–4.6)
LYMPHOCYTES NFR BLD AUTO: 13.4 %
MCH RBC QN AUTO: 22.5 PG (ref 27–31)
MCHC RBC AUTO-ENTMCNC: 26.6 G/DL (ref 33–36)
MCV RBC AUTO: 84.4 FL (ref 80–94)
MONOCYTES # BLD AUTO: 0.5 X10(3)/MCL (ref 0.1–1.3)
MONOCYTES NFR BLD AUTO: 9 %
NEUTROPHILS # BLD AUTO: 4 X10(3)/MCL (ref 2.1–9.2)
NEUTROPHILS NFR BLD AUTO: 72.4 %
NITRITE, POC UA: NORMAL
PH, POC UA: 7
PLATELET # BLD AUTO: 390 X10(3)/MCL (ref 130–400)
PMV BLD AUTO: 12.2 FL (ref 7.4–10.4)
PROTEIN, POC: NORMAL
RBC # BLD AUTO: 3.6 X10(6)/MCL (ref 4.2–5.4)
SPECIFIC GRAVITY, POC UA: 1.02
TIBC SERPL-MCNC: <25 UG/DL (ref 70–310)
TIBC SERPL-MCNC: <472 UG/DL (ref 250–450)
TRANSFERRIN SERPL-MCNC: 359 MG/DL (ref 173–360)
UROBILINOGEN, POC UA: 0.2
WBC # SPEC AUTO: 5.53 X10(3)/MCL (ref 4.5–11.5)

## 2024-01-01 PROCEDURE — 80061 LIPID PANEL: CPT | Performed by: NURSE PRACTITIONER

## 2024-01-01 PROCEDURE — 85025 COMPLETE CBC W/AUTO DIFF WBC: CPT | Performed by: NURSE PRACTITIONER

## 2024-01-01 PROCEDURE — 85025 COMPLETE CBC W/AUTO DIFF WBC: CPT

## 2024-01-01 PROCEDURE — 94640 AIRWAY INHALATION TREATMENT: CPT

## 2024-01-01 PROCEDURE — 83540 ASSAY OF IRON: CPT

## 2024-01-01 PROCEDURE — 81002 URINALYSIS NONAUTO W/O SCOPE: CPT | Mod: RHCUB | Performed by: NURSE PRACTITIONER

## 2024-01-01 PROCEDURE — 84443 ASSAY THYROID STIM HORMONE: CPT | Performed by: NURSE PRACTITIONER

## 2024-01-01 PROCEDURE — 36415 COLL VENOUS BLD VENIPUNCTURE: CPT

## 2024-01-01 PROCEDURE — 83036 HEMOGLOBIN GLYCOSYLATED A1C: CPT | Performed by: NURSE PRACTITIONER

## 2024-01-01 PROCEDURE — 99214 OFFICE O/P EST MOD 30 MIN: CPT | Mod: ,,, | Performed by: NURSE PRACTITIONER

## 2024-01-01 PROCEDURE — 25000242 PHARM REV CODE 250 ALT 637 W/ HCPCS: Performed by: NURSE PRACTITIONER

## 2024-01-01 PROCEDURE — 94761 N-INVAS EAR/PLS OXIMETRY MLT: CPT

## 2024-01-01 PROCEDURE — 99283 EMERGENCY DEPT VISIT LOW MDM: CPT | Mod: 25

## 2024-01-01 PROCEDURE — 84466 ASSAY OF TRANSFERRIN: CPT

## 2024-01-01 PROCEDURE — 82728 ASSAY OF FERRITIN: CPT

## 2024-01-01 PROCEDURE — 27000221 HC OXYGEN, UP TO 24 HOURS

## 2024-01-01 PROCEDURE — 80053 COMPREHEN METABOLIC PANEL: CPT | Performed by: NURSE PRACTITIONER

## 2024-01-01 RX ORDER — LEVOTHYROXINE SODIUM 50 UG/1
50 TABLET ORAL
Qty: 90 TABLET | Refills: 3 | Status: SHIPPED | OUTPATIENT
Start: 2024-01-01

## 2024-01-01 RX ORDER — IPRATROPIUM BROMIDE AND ALBUTEROL SULFATE 2.5; .5 MG/3ML; MG/3ML
SOLUTION RESPIRATORY (INHALATION)
Qty: 1080 ML | Refills: 3 | Status: SHIPPED | OUTPATIENT
Start: 2024-01-01

## 2024-01-01 RX ORDER — OMEPRAZOLE 40 MG/1
40 CAPSULE, DELAYED RELEASE ORAL EVERY MORNING
COMMUNITY
End: 2024-01-01 | Stop reason: ALTCHOICE

## 2024-01-01 RX ORDER — ATORVASTATIN CALCIUM 20 MG/1
20 TABLET, FILM COATED ORAL DAILY
COMMUNITY
End: 2024-01-01 | Stop reason: ALTCHOICE

## 2024-01-01 RX ORDER — DEXLANSOPRAZOLE 30 MG/1
1 CAPSULE, DELAYED RELEASE ORAL
Qty: 30 CAPSULE | Refills: 11 | Status: SHIPPED | OUTPATIENT
Start: 2024-01-01

## 2024-01-01 RX ORDER — LISINOPRIL 2.5 MG/1
2.5 TABLET ORAL DAILY
COMMUNITY
End: 2024-01-01 | Stop reason: ALTCHOICE

## 2024-01-01 RX ORDER — NITROFURANTOIN 25; 75 MG/1; MG/1
100 CAPSULE ORAL 2 TIMES DAILY
Qty: 10 CAPSULE | Refills: 0 | Status: SHIPPED | OUTPATIENT
Start: 2024-01-01 | End: 2024-01-01

## 2024-01-01 RX ORDER — IPRATROPIUM BROMIDE AND ALBUTEROL SULFATE 2.5; .5 MG/3ML; MG/3ML
3 SOLUTION RESPIRATORY (INHALATION)
Status: COMPLETED | OUTPATIENT
Start: 2024-01-01 | End: 2024-01-01

## 2024-01-01 RX ORDER — ROSUVASTATIN CALCIUM 20 MG/1
20 TABLET, COATED ORAL DAILY
Qty: 90 TABLET | Refills: 3 | Status: SHIPPED | OUTPATIENT
Start: 2024-01-01 | End: 2025-03-27

## 2024-01-01 RX ORDER — METFORMIN HYDROCHLORIDE 500 MG/1
500 TABLET ORAL 2 TIMES DAILY WITH MEALS
Qty: 180 TABLET | Refills: 3 | Status: SHIPPED | OUTPATIENT
Start: 2024-01-01

## 2024-01-01 RX ORDER — DULOXETIN HYDROCHLORIDE 30 MG/1
30 CAPSULE, DELAYED RELEASE ORAL DAILY
Qty: 90 CAPSULE | Refills: 3 | Status: SHIPPED | OUTPATIENT
Start: 2024-01-01 | End: 2024-01-01 | Stop reason: ALTCHOICE

## 2024-01-01 RX ORDER — OXYBUTYNIN CHLORIDE 5 MG/1
5 TABLET ORAL DAILY
Qty: 90 TABLET | Refills: 3 | Status: SHIPPED | OUTPATIENT
Start: 2024-01-01 | End: 2024-01-01

## 2024-01-01 RX ADMIN — IPRATROPIUM BROMIDE AND ALBUTEROL SULFATE 3 ML: .5; 3 SOLUTION RESPIRATORY (INHALATION) at 02:04

## 2024-02-27 PROBLEM — F32.5 MAJOR DEPRESSIVE DISORDER WITH SINGLE EPISODE, IN FULL REMISSION: Status: ACTIVE | Noted: 2024-02-27

## 2024-02-27 PROBLEM — L89.222: Status: ACTIVE | Noted: 2024-02-27

## 2024-02-27 PROBLEM — L89.150 PRESSURE ULCER OF SACRAL REGION, UNSTAGEABLE: Status: ACTIVE | Noted: 2024-02-27

## 2024-02-27 PROBLEM — Z93.0 TRACHEOSTOMY STATUS: Status: ACTIVE | Noted: 2024-02-27

## 2024-02-27 PROBLEM — E46 UNSPECIFIED PROTEIN-CALORIE MALNUTRITION: Status: ACTIVE | Noted: 2024-02-27

## 2024-02-27 PROBLEM — I42.8 OTHER CARDIOMYOPATHIES: Status: ACTIVE | Noted: 2024-02-27

## 2024-02-27 NOTE — PROGRESS NOTES
Patient ID: Michel De La Paz  : 1955    Chief Complaint: chronic illness  (Follow up )    Allergies: Patient is allergic to benadryl [diphenhydramine hcl] and darvocet a500 [propoxyphene n-acetaminophen].     History of Present Illness:  The patient is a 68 y.o. Black or  female who presents to clinic for follow up on chronic illness  (Follow up )     Here for 6 month f/u on chronic illnesses. She lives at home with her grandson's who help with the shopping, cooking, and some ADLs. She was placed on palliative care as she has end-stage lung disease. Her appetite has actually increased, she is eating much better and has actually gained a couple of pounds.     She is having some increased urinary frequency recently and feels that she has a UTI. Denies dysuria, hematuria, abdominal pain, back pain.     Her H&H is a little low. She has been on iron supplementation QOD. She is feeling a little weak, more short winded than normal. Denies bleeding, no melena.    She wants to stop some medication; does not feel that she needs an antidepressant now. It was initiated after a lengthy hospitalization and she feels that she is mentally in a much better place now.       Social History:  reports that she has quit smoking. She has never used smokeless tobacco. She reports that she does not currently use alcohol. She reports that she does not currently use drugs.    Past Medical History:  has a past medical history of CHF (congestive heart failure), Chronic anticoagulation, Chronic low back pain, COPD (chronic obstructive pulmonary disease), Diabetes mellitus, type 2, Diverticular disease of large intestine without perforation or abscess, DM (diabetes mellitus), GERD (gastroesophageal reflux disease), Hypertension, Mild intermittent asthma, uncomplicated, Neuropathy, Oxygen dependent, Respiratory failure, Sleep apnea, and Tracheostomy dependent.    Current Medications:  Current Outpatient Medications  "  Medication Instructions    albuterol (PROVENTIL/VENTOLIN HFA) 90 mcg/actuation inhaler INHALE 2 PUFFS FOUR TIMES DAILY AS NEEDED FOR WHEEZING    albuterol-ipratropium (DUO-NEB) 2.5 mg-0.5 mg/3 mL nebulizer solution INHALE THE CONTENTS OF 1 VIAL VIA NEBULIZER FOUR TIMES DAILY (SUBSTITUTED FOR DUONEB)    ascorbic acid (vitamin C) (VITAMIN C) 500 mg, Oral, 2 times daily    aspirin (ECOTRIN) 81 mg, Oral, Daily    atorvastatin (LIPITOR) 20 mg, Oral, Daily    blood sugar diagnostic Strp To check BG one time daily, to use with insurance preferred meter    blood-glucose meter kit To check BG one time daily, to use with insurance preferred meter    carvediloL (COREG) 3.125 mg, Oral, 2 times daily    dexlansoprazole (DEXILANT) 30 mg, Oral    diclofenac sodium (VOLTAREN) 2 g, Topical (Top), Daily    ferrous sulfate (FEOSOL) 325 mg, Oral, Every other day    furosemide (LASIX) 20 mg, Oral, See admin instructions, Take    lancets Misc To check BG one time daily, to use with insurance preferred meter    levothyroxine (SYNTHROID) 50 mcg, Oral, Before breakfast    linaCLOtide (LINZESS) 290 mcg, Oral, Daily    lisinopriL (PRINIVIL,ZESTRIL) 2.5 mg, Oral, Daily    loratadine (CLARITIN) 10 mg tablet TAKE 1 TABLET EVERY DAY    metFORMIN (GLUCOPHAGE) 500 mg, Oral, 2 times daily with meals    nitrofurantoin, macrocrystal-monohydrate, (MACROBID) 100 MG capsule 100 mg, Oral, 2 times daily    omeprazole (PRILOSEC) 40 mg, Oral, Every morning    oxybutynin (DITROPAN-XL) 5 mg, Oral, Daily    rosuvastatin (CRESTOR) 20 mg, Oral, Daily    theophylline 400 mg, Oral, Daily    TRELEGY ELLIPTA 100-62.5-25 mcg DsDv 1 puff, Inhalation, Daily       Review of Systems   See HPI    Visit Vitals  /62 (BP Location: Left arm)   Pulse 72   Temp 97.9 °F (36.6 °C) (Temporal)   Ht 5' 4" (1.626 m)   Wt 55.3 kg (122 lb)   LMP  (LMP Unknown)   SpO2 96%   BMI 20.94 kg/m²       Physical Exam  Constitutional:       Appearance: Normal appearance. She is normal " weight.   Eyes:      Conjunctiva/sclera: Conjunctivae normal.   Cardiovascular:      Heart sounds: Normal heart sounds.   Pulmonary:      Breath sounds: No wheezing, rhonchi or rales.      Comments: Diminished breath sounds all lung fields  Abdominal:      General: Bowel sounds are normal. There is no distension.      Palpations: Abdomen is soft.      Tenderness: There is abdominal tenderness (suprapubic). There is no right CVA tenderness or left CVA tenderness.   Musculoskeletal:      Right lower leg: No edema.      Left lower leg: No edema.   Skin:     General: Skin is warm and dry.      Coloration: Skin is pale (conjunctiva).   Neurological:      Mental Status: She is alert.   Psychiatric:         Mood and Affect: Mood normal.         Behavior: Behavior normal.          Labs Reviewed:  Chemistry:  Lab Results   Component Value Date     02/23/2024    K 5.3 (H) 02/23/2024    CHLORIDE 98 02/23/2024    BUN 14.0 02/23/2024    CREATININE 0.59 (L) 02/23/2024    EGFRNORACEVR >90 02/23/2024    GLUCOSE 123 (H) 02/23/2024    CALCIUM 9.4 02/23/2024    ALKPHOS 68 02/23/2024    LABPROT 7.3 02/23/2024    ALBUMIN 4.0 02/23/2024    AST 36 02/23/2024    ALT 14 02/23/2024    MG 1.80 03/06/2023    PHOS 2.8 02/02/2023    TSH 1.460 02/23/2024    ACYYGQ1CRBH 0.71 (L) 06/21/2023        Lab Results   Component Value Date    HGBA1C 5.3 02/23/2024        Hematology:  Lab Results   Component Value Date    WBC 4.79 02/23/2024    RBC 3.34 (L) 02/23/2024    HGB 7.9 (L) 02/23/2024    HCT 28.0 (L) 02/23/2024    MCV 83.8 02/23/2024    MCH 23.7 (L) 02/23/2024    MCHC 28.2 (L) 02/23/2024    RDW 18.5 (H) 02/23/2024     02/23/2024    MPV 12.4 02/23/2024       Lipid Panel:  Lab Results   Component Value Date    CHOL 266 (H) 02/23/2024    HDL 98 (H) 02/23/2024    DLDL 115.3 (H) 02/23/2024    TRIG 85 02/23/2024        Assessment & Plan:  1. Type 2 diabetes mellitus with diabetic polyneuropathy, without long-term current use of  insulin  Overview:  Metformin 500 mg BID    Assessment & Plan:  Diabetes labs:   Lab Results   Component Value Date    HGBA1C 5.3 02/23/2024      Continue with Metformin.   Take all medications as directed; compliance is critical for managing your diabetes.   Follow American Diabetic Association (ADA) dietary guidelines for eating and implement exercise into your daily regimen.   Check your glucose levels each morning and record for review at follow up.    2. Mixed simple and mucopurulent chronic bronchitis  Overview:  On Trelegy Ellipta daily and theophylline 400 mg daily.  Nebulizers p.r.n.    Assessment & Plan:  F/u Pulmonology as scheduled.     3. On home oxygen therapy    4. Primary hypertension  Overview:  On Coreg 3.125 mg b.i.d., Lasix 20 mg daily    Assessment & Plan:  Well controlled.   Continue current medications.   Low Sodium Diet (DASH Diet - Less than 2 grams of sodium per day).  Monitor blood pressure daily and log. Report consistent numbers greater than 140/90.  Maintain healthy weight with goal BMI <30. Exercise 30 minutes per day, 5 days per week.  Smoking cessation encouraged to aid in BP reduction.    5. Chronic constipation  Overview:  On Linzess 290 mcg daily     Assessment & Plan:  Advised increased dietary fiber and water intake.    6. Anemia in other chronic diseases classified elsewhere  Comments:  Will repeat CBC and do some iron studies to see if maybe an iron infusion might be more effective than PO supplementation.  Assessment & Plan:  Remains on iron supplementation.   Hematology:  Lab Results   Component Value Date    WBC 4.79 02/23/2024    RBC 3.34 (L) 02/23/2024    HGB 7.9 (L) 02/23/2024    HCT 28.0 (L) 02/23/2024    MCV 83.8 02/23/2024    MCH 23.7 (L) 02/23/2024    MCHC 28.2 (L) 02/23/2024    RDW 18.5 (H) 02/23/2024     02/23/2024    MPV 12.4 02/23/2024        Orders:  -     CBC Auto Differential; Future; Expected date: 02/28/2024  -     Iron and TIBC; Future; Expected  date: 02/28/2024  -     Transferrin; Future; Expected date: 02/28/2024  -     Ferritin; Future; Expected date: 02/28/2024    7. Urinary frequency  Comments:  Start Macrobid as directed; will send urine for culture.  increase water intake.  Orders:  -     POCT URINE DIPSTICK WITHOUT MICROSCOPE  -     nitrofurantoin, macrocrystal-monohydrate, (MACROBID) 100 MG capsule; Take 1 capsule (100 mg total) by mouth 2 (two) times daily. for 5 days  Dispense: 10 capsule; Refill: 0  -     Urine Culture High Risk    8. Major depressive disorder with single episode, in full remission  Comments:  Can stop Cymbalta 30 mg daily  Overview:  On Cymbalta 30 mg daily.    Assessment & Plan:  Patient wishes to stop this medication.  Advise that she can discontinue at this time and if she feels that she is becoming depressed again we can simply resume it.      9. CHF (congestive heart failure), NYHA class III, acute on chronic, diastolic  Overview:  Established with Cardiology.  On carvedilol, furosemide, and lisinopril.    Assessment & Plan:  Continue heart failure therapy, follow-up with cardiology as scheduled.    10. Paroxysmal atrial fibrillation  Overview:  On ASA 81 mg daily and Coreg 3.125 mg BID; per Cardiology    11. Other cardiomyopathies  Overview:  Noted by [facility]  last documented on [id number]               Future Appointments   Date Time Provider Department Center   5/17/2024  9:30 AM [address]   8/21/2024  8:40 AM LAB, [facility] LABORATORY DRAW STATION [facility] [place]   8/28/2024 11:00 AM Mariajose Hutchinson FNP-C [facility]       Follow up for 6 mo f/u, Wellness, Fasting labs prior. Call sooner if needed.    [facility]    Lab Frequency Next Occurrence   Ambulatory referral/consult to Home Health Once 03/08/2023   Ambulatory referral/consult to HOME Palliative Care Once 03/16/2023    Retroperitoneal Complete Once 05/17/2023

## 2024-02-28 PROBLEM — K92.1 BLOOD IN STOOL: Status: RESOLVED | Noted: 2023-09-27 | Resolved: 2024-02-28

## 2024-02-28 PROBLEM — D63.8 ANEMIA IN OTHER CHRONIC DISEASES CLASSIFIED ELSEWHERE: Status: ACTIVE | Noted: 2023-01-22

## 2024-02-28 PROBLEM — L89.222: Status: RESOLVED | Noted: 2024-02-27 | Resolved: 2024-02-28

## 2024-02-28 PROBLEM — E46 UNSPECIFIED PROTEIN-CALORIE MALNUTRITION: Status: RESOLVED | Noted: 2024-02-27 | Resolved: 2024-02-28

## 2024-02-28 PROBLEM — Z93.0 TRACHEOSTOMY STATUS: Status: RESOLVED | Noted: 2024-02-27 | Resolved: 2024-02-28

## 2024-02-28 PROBLEM — L89.150 PRESSURE ULCER OF SACRAL REGION, UNSTAGEABLE: Status: RESOLVED | Noted: 2024-02-27 | Resolved: 2024-02-28

## 2024-02-28 PROBLEM — Z79.01 CHRONIC ANTICOAGULATION: Status: RESOLVED | Noted: 2023-03-21 | Resolved: 2024-02-28

## 2024-02-28 PROBLEM — R63.4 RECENT WEIGHT LOSS: Status: RESOLVED | Noted: 2023-01-22 | Resolved: 2024-02-28

## 2024-02-28 NOTE — TELEPHONE ENCOUNTER
"Per Mariajose: "When it is convenient for her have her run by the hospital and get some blood work again.  This will recheck her blood counts and also do some further anemia workup so that I can determine if iron infusions might work better for her and boost her better than oral iron supplementation that she is taking now."    I called pt and notified her. She said that she is going to [place] tomorrow so she will go to the hospital and have them drawn.  "

## 2024-02-28 NOTE — ASSESSMENT & PLAN NOTE
Diabetes labs:   Lab Results   Component Value Date    HGBA1C 5.3 02/23/2024      Continue with Metformin.

## 2024-02-28 NOTE — ASSESSMENT & PLAN NOTE
Patient wishes to stop this medication.  Advise that she can discontinue at this time and if she feels that she is becoming depressed again we can simply resume it.

## 2024-02-28 NOTE — ASSESSMENT & PLAN NOTE
Well controlled.   Continue current medications.   Low Sodium Diet (DASH Diet - Less than 2 grams of sodium per day).  Monitor blood pressure daily and log. Report consistent numbers greater than 140/90.  Maintain healthy weight with goal BMI <30. Exercise 30 minutes per day, 5 days per week.  Smoking cessation encouraged to aid in BP reduction.

## 2024-02-28 NOTE — ASSESSMENT & PLAN NOTE
Remains on iron supplementation.   Hematology:  Lab Results   Component Value Date    WBC 4.79 02/23/2024    RBC 3.34 (L) 02/23/2024    HGB 7.9 (L) 02/23/2024    HCT 28.0 (L) 02/23/2024    MCV 83.8 02/23/2024    MCH 23.7 (L) 02/23/2024    MCHC 28.2 (L) 02/23/2024    RDW 18.5 (H) 02/23/2024     02/23/2024    MPV 12.4 02/23/2024

## 2024-02-29 NOTE — TELEPHONE ENCOUNTER
She was asking about a rx for Lyrica that she was working on and it is dated in December. I explained to her that it isn't on her med list so she is going to send a note to this patient letting her know that she will need to contact us if she needs it.

## 2024-03-27 NOTE — PROGRESS NOTES
Patient ID: Michel De La Paz  : 1955    Chief Complaint: Follow-up (Hospital follow up)    Allergies: Patient is allergic to benadryl [diphenhydramine hcl] and darvocet a500 [propoxyphene n-acetaminophen].     History of Present Illness:  The patient is a 68 y.o. Black or  female who presents to clinic for follow up on Follow-up (Hospital follow up)     Here for hospital follow up. Admitted recently to [facility] with respiratory failure. She has end stage COPD. She is on oxygen aroound the clock.  Discharged home on hospice. She seems to be doing well. She has been able to get all the home DME that she needed.     Social History:  reports that she has quit smoking. She has never used smokeless tobacco. She reports that she does not currently use alcohol. She reports that she does not currently use drugs.    Past Medical History:  has a past medical history of CHF (congestive heart failure), Chronic anticoagulation, Chronic low back pain, COPD (chronic obstructive pulmonary disease), Diabetes mellitus, type 2, Diverticular disease of large intestine without perforation or abscess, DM (diabetes mellitus), GERD (gastroesophageal reflux disease), Hypertension, Mild intermittent asthma, uncomplicated, Neuropathy, Oxygen dependent, Respiratory failure, Sleep apnea, and Tracheostomy dependent.    Current Medications:  Current Outpatient Medications   Medication Instructions    albuterol (PROVENTIL/VENTOLIN HFA) 90 mcg/actuation inhaler INHALE 2 PUFFS FOUR TIMES DAILY AS NEEDED FOR WHEEZING    albuterol-ipratropium (DUO-NEB) 2.5 mg-0.5 mg/3 mL nebulizer solution INHALE THE CONTENTS OF 1 VIAL VIA NEBULIZER FOUR TIMES DAILY (SUBSTITUTED FOR DUONEB)    ascorbic acid (vitamin C) (VITAMIN C) 500 mg, Oral, 2 times daily    aspirin (ECOTRIN) 81 mg, Oral, Daily    blood sugar diagnostic Strp To check BG one time daily, to use with insurance preferred meter    blood-glucose meter kit To check BG one time daily, to  "use with insurance preferred meter    carvediloL (COREG) 3.125 mg, Oral, 2 times daily    dexlansoprazole (DEXILANT) 30 mg, Oral    diclofenac sodium (VOLTAREN) 2 g, Topical (Top), Daily    ferrous sulfate (FEOSOL) 325 mg, Oral, Every other day    furosemide (LASIX) 20 mg, Oral, See admin instructions, Take    lancets Misc To check BG one time daily, to use with insurance preferred meter    levothyroxine (SYNTHROID) 50 mcg, Oral, Before breakfast    linaCLOtide (LINZESS) 290 mcg, Oral, Daily    loratadine (CLARITIN) 10 mg tablet TAKE 1 TABLET EVERY DAY    metFORMIN (GLUCOPHAGE) 500 mg, Oral, 2 times daily with meals    oxybutynin (DITROPAN-XL) 5 mg, Oral, Daily    rosuvastatin (CRESTOR) 20 mg, Oral, Daily    TRELEGY ELLIPTA 100-62.5-25 mcg DsDv 1 puff, Inhalation, Daily       Review of Systems   See HPI    Visit Vitals  BP (!) 100/52 (BP Location: Right arm)   Temp 97.9 °F (36.6 °C) (Temporal)   Ht 5' 4" (1.626 m)   Wt 56.5 kg (124 lb 9.6 oz)   LMP  (LMP Unknown)   SpO2 100%   BMI 21.39 kg/m²       Physical Exam  Vitals reviewed.   Constitutional:       Appearance: Normal appearance.   Eyes:      Conjunctiva/sclera: Conjunctivae normal.   Cardiovascular:      Heart sounds: Normal heart sounds.   Pulmonary:      Effort: No respiratory distress.      Breath sounds: Normal breath sounds. No wheezing.      Comments: Diminished breath sounds all lung fields-@baseline  Skin:     General: Skin is warm and dry.   Neurological:      Mental Status: She is oriented to person, place, and time.          Labs Reviewed:  Chemistry:  Lab Results   Component Value Date     02/23/2024    K 5.3 (H) 02/23/2024    CHLORIDE 98 02/23/2024    BUN 14.0 02/23/2024    CREATININE 0.59 (L) 02/23/2024    EGFRNORACEVR >90 02/23/2024    GLUCOSE 123 (H) 02/23/2024    CALCIUM 9.4 02/23/2024    ALKPHOS 68 02/23/2024    LABPROT 7.3 02/23/2024    ALBUMIN 4.0 02/23/2024    AST 36 02/23/2024    ALT 14 02/23/2024    MG 1.80 03/06/2023    PHOS 2.8 " 02/02/2023    TSH 1.460 02/23/2024    [id number] 0.71 (L) 06/21/2023        Lab Results   Component Value Date    HGBA1C 5.3 02/23/2024        Hematology:  Lab Results   Component Value Date    WBC 5.53 03/01/2024    RBC 3.60 (L) 03/01/2024    HGB 8.1 (L) 03/01/2024    HCT 30.4 (L) 03/01/2024    MCV 84.4 03/01/2024    MCH 22.5 (L) 03/01/2024    MCHC 26.6 (L) 03/01/2024    RDW 18.2 (H) 03/01/2024     03/01/2024    MPV 12.2 (H) 03/01/2024       Lipid Panel:  Lab Results   Component Value Date    CHOL 266 (H) 02/23/2024    HDL 98 (H) 02/23/2024    DLDL 115.3 (H) 02/23/2024    TRIG 85 02/23/2024        Assessment & Plan:  1. Hospital discharge follow-up  Comments:  Has been discharged home on Hospice. Will fill medications today at her request.    2. Medication management  -     rosuvastatin (CRESTOR) 20 MG tablet; Take 1 tablet (20 mg total) by mouth once daily.  Dispense: 90 tablet; Refill: 3         Future Appointments   Date Time Provider Department Center   5/17/2024  9:30 AM [address] [place]   8/21/2024  8:40 AM LAB, [facility] LABORATORY DRAW STATION [facility] GABRIELA [place]   8/28/2024 11:00 AM [facility]       Follow up if symptoms worsen or fail to improve. Call sooner if needed.    WESLY Best    Lab Frequency Next Occurrence   Ambulatory referral/consult to Home Health Once 03/08/2023   Ambulatory referral/consult to HOME Palliative Care Once 03/16/2023   US Retroperitoneal Complete Once 05/17/2023   CBC Auto Differential Once 08/28/2024   Comprehensive Metabolic Panel Once 08/28/2024   Lipid Panel Once 08/28/2024   TSH Once 08/28/2024   Hemoglobin A1C Once 08/28/2024   Microalbumin/Creatinine Ratio, Urine Once 08/28/2024

## 2024-04-10 NOTE — ED PROVIDER NOTES
Encounter Date: 4/10/2024       History     Chief Complaint   Patient presents with    Shortness of Breath     Lost power at home   O2 dependant   needs O2     Patient is a 68-year-old female with history of chronic COPD currently in hospice services who presents to the emergency department needing oxygen.  Apparently she lost power at around 4:00 a.m. and has been using portable concentrator since her home O2 concentrator does not have power but states she would becoming more short of breath over the past several hours.  Apparently she contacted the hospice this morning who ordered a stat oxygen bottle but still has not got it at this point.  Patient presented with O2 sat on room air 43%.  Patient denies fevers chills.  She denies chest pain but is short of breath.  Denies any other symptoms or complaints at this time.  Appears much better than her initial O2 sat at this time after being on 4 L nasal cannula for just a few minutes.      Review of patient's allergies indicates:   Allergen Reactions    Benadryl [diphenhydramine hcl] Palpitations    Darvocet a500 [propoxyphene n-acetaminophen] Rash     Past Medical History:   Diagnosis Date    CHF (congestive heart failure)     Chronic anticoagulation 03/21/2023    Chronic low back pain     COPD (chronic obstructive pulmonary disease)     Diabetes mellitus, type 2     Diverticular disease of large intestine without perforation or abscess     DM (diabetes mellitus)     GERD (gastroesophageal reflux disease)     Hypertension     Mild intermittent asthma, uncomplicated     Neuropathy     Oxygen dependent     3 l    Respiratory failure     Sleep apnea     Tracheostomy dependent 03/07/2023    removed     Past Surgical History:   Procedure Laterality Date    ARTHROSCOPY OF KNEE Right     BACK SURGERY      BREAST SURGERY      CARPAL TUNNEL RELEASE      CATARACT EXTRACTION      COLONOSCOPY  02/28/2020    EYE SURGERY  2019    HYSTERECTOMY      LEFT LUNG      MICROLARYNGOSCOPY  WITH BIOPSY OF VOCAL CORD  2016    REMOVAL OF TRACHEOSTOMY TUBE      SHOULDER ARTHROSCOPY W/ ROTATOR CUFF REPAIR Right     TRACHEOSTOMY TUBE PLACEMENT       Family History   Problem Relation Age of Onset    Aneurysm Mother     Heart failure Mother     Diabetes type I Father     Kidney disease Father     Prostate cancer Father     Hypertension Brother     Kidney disease Brother      Social History     Tobacco Use    Smoking status: Former    Smokeless tobacco: Never   Substance Use Topics    Alcohol use: Not Currently    Drug use: Not Currently     Review of Systems   Constitutional:  Negative for activity change, appetite change and fever.   HENT:  Negative for congestion, dental problem and sore throat.    Respiratory:  Positive for cough, chest tightness and shortness of breath. Negative for apnea, choking, wheezing and stridor.    Cardiovascular:  Negative for chest pain.   Gastrointestinal:  Negative for abdominal distention, abdominal pain and nausea.   Genitourinary:  Negative for dysuria, vaginal bleeding, vaginal discharge and vaginal pain.   Musculoskeletal:  Negative for back pain.   Skin:  Negative for rash.   Neurological:  Negative for dizziness, facial asymmetry and weakness.   Hematological:  Does not bruise/bleed easily.   Psychiatric/Behavioral:  Negative for agitation and behavioral problems.    All other systems reviewed and are negative.      Physical Exam     Initial Vitals [04/10/24 1423]   BP Pulse Resp Temp SpO2   (!) 92/55 92 (!) 22 98.2 °F (36.8 °C) (!) 43 %      MAP       --         Physical Exam    Nursing note and vitals reviewed.  Constitutional: Vital signs are normal. She appears well-developed and well-nourished.  Non-toxic appearance. She does not have a sickly appearance.   HENT:   Head: Normocephalic and atraumatic.   Right Ear: External ear normal.   Left Ear: External ear normal.   Eyes: Conjunctivae, EOM and lids are normal. Lids are everted and swept, no foreign bodies found.    Neck: Trachea normal and phonation normal. Neck supple. No thyroid mass and no thyromegaly present.   Normal range of motion.   Full passive range of motion without pain.     Cardiovascular:  Normal rate, regular rhythm, S1 normal, S2 normal, normal heart sounds, intact distal pulses and normal pulses.           Pulmonary/Chest:   Diminished breath sounds throughout.  No rhonchi wheezing noted.  Patient moving very little air but O2 sat up to 98% on 4 L.  Back down to 3 L which is her baseline at this time.   Abdominal: Abdomen is soft. There is no abdominal tenderness.   Musculoskeletal:      Cervical back: Full passive range of motion without pain, normal range of motion and neck supple.     Lymphadenopathy:     She has no cervical adenopathy.   Neurological: She is alert and oriented to person, place, and time. She has normal strength. GCS score is 15. GCS eye subscore is 4. GCS verbal subscore is 5. GCS motor subscore is 6.   Skin: Skin is warm, dry and intact. Capillary refill takes less than 2 seconds.   Psychiatric: She has a normal mood and affect. Her speech is normal and behavior is normal. Judgment normal. Cognition and memory are normal.         ED Course   Procedures  Labs Reviewed - No data to display       Imaging Results              X-Ray Chest 1 View (Final result)  Result time 04/10/24 16:43:20      Final result by Norberto Poe MD (04/10/24 16:43:20)                   Impression:      1. Subtle patchy density lateral left lung base suspicious for residual infiltrate, atelectasis, and/or scarring  2. COPD changes  3. Atherosclerosis  4. Thoracic spondylosis      Electronically signed by: Norberto Poe  Date:    04/10/2024  Time:    16:43               Narrative:    EXAMINATION:  XR CHEST 1 VIEW    CLINICAL HISTORY:  , Shortness of breath.    COMPARISON:  09/22/2023    FINDINGS:  An AP view or more reveals the heart to be normal in size.  The trachea is mostly midline.  The lungs are well  expanded.  A subtle patchy density is evident at the lateral left lung base.  No consolidative infiltrate or effusion is seen.  Degenerative changes are noted to the thoracic spine.  Atherosclerosis is seen within the aorta.                        Wet Read by Jose Antonio Hayes FNP (04/10/24 15:28:30, [facility] - Emergency Dept, Emergency Medicine)    Wet read:  No obvious acute cardiopulmonary changes.  Reviewed previous chest x-ray.  Pending radiologist's review.                                     Medications   albuterol-ipratropium 2.5 mg-0.5 mg/3 mL nebulizer solution 3 mL (3 mLs Nebulization Given 4/10/24 6650)     Medical Decision Making  Patient is a 68-year-old female with history of chronic COPD currently in hospice services who presents to the emergency department needing oxygen.  Apparently she lost power at around 4:00 a.m. and has been using portable concentrator since her home O2 concentrator does not have power but states she would becoming more short of breath over the past several hours.  Apparently she contacted the hospice this morning who ordered a stat oxygen bottle but still has not got it at this point.  Patient presented with O2 sat on room air 43%.  Patient denies fevers chills.  She denies chest pain but is short of breath.  Denies any other symptoms or complaints at this time.  Appears much better than her initial O2 sat at this time after being on 4 L nasal cannula for just a few minutes.    Problems Addressed:  COPD exacerbation: acute illness or injury     Details: Patient was essentially here just to be on our oxygen should she had run out she was acutely hypoxic being at home without it.  Patient does have her oxygen replaced for her hospice services so we will discharge her back home.    Amount and/or Complexity of Data Reviewed  Radiology: ordered and independent interpretation performed.    Risk  Prescription drug management.               ED Course as of 04/10/24  2014   Wed Apr 10, 2024   1436 Spoke to Tami at [facility] who states that she has had stat O2 ordered since this morning.  She apologized she did not know what was taking so long but apparently oxygen should be an route within the next hour to 2 hours.  Patient was sent here is a referral for her just because she has no oxygen at home as she was getting very short of breath at home.  Will give DuoNeb.  She was very diminished.  Currently on 3 L satting 98%. [SL]   1453 Triage nurse reports patient was moderate to severe distress and 1-2 minutes after being on oxygen improved significantly which is when I walked into the room to assess this patient.  She generally diminished throughout but no major rhonchi wheezing.  Will order breathing treatment to help open the patient up.   [SL]   1627 Hospice nurses here who states she is attempted to call the oxygen company but is going to attempt to call them again stays still not called to transport the oxygen bottle needed for the patient to go home. [SL]   2013 Grandson called let the nurse know that he received oxygen at the home.  Patient does have portable concentrator.  Will discharge to home so she can begin back on her oxygen therapy. [SL]      ED Course User Index  [SL] Jose Antonio Hayes FNP                           Clinical Impression:  Final diagnoses:  [R06.02] Shortness of breath  [J44.1] COPD exacerbation (Primary)          ED Disposition Condition    Discharge Stable          ED Prescriptions    None       Follow-up Information       Follow up With Specialties Details Why Contact Info    Mariajose Hutchinson FNP-C Family Medicine Schedule an appointment as soon as possible for a visit in 2 days For ER Follow Up. [address]  [facility] [address]  [phone]               Jose Antonio Hayes FNP  04/10/24 2014